# Patient Record
Sex: FEMALE | Race: WHITE | Employment: FULL TIME | URBAN - METROPOLITAN AREA
[De-identification: names, ages, dates, MRNs, and addresses within clinical notes are randomized per-mention and may not be internally consistent; named-entity substitution may affect disease eponyms.]

---

## 2017-02-16 ENCOUNTER — HOSPITAL ENCOUNTER (OUTPATIENT)
Dept: ULTRASOUND IMAGING | Age: 42
Discharge: HOME | End: 2017-02-16
Attending: OBSTETRICS & GYNECOLOGY | Admitting: OBSTETRICS & GYNECOLOGY

## 2017-02-16 DIAGNOSIS — Z12.31 ENCOUNTER FOR SCREENING MAMMOGRAM FOR BREAST CANCER: ICD-10-CM

## 2017-02-16 DIAGNOSIS — D25.9 LEIOMYOMA OF UTERUS, UNSPECIFIED: ICD-10-CM

## 2017-02-16 DIAGNOSIS — D25.9 LEIOMYOMA OF UTERUS: ICD-10-CM

## 2017-04-30 ENCOUNTER — HOSPITAL ENCOUNTER (OUTPATIENT)
Dept: OTHER | Age: 42
Discharge: HOME | End: 2017-04-30
Attending: PHYSICIAN ASSISTANT | Admitting: PHYSICIAN ASSISTANT

## 2018-04-03 PROBLEM — N93.9 ABNORMAL UTERINE BLEEDING (AUB): Status: ACTIVE | Noted: 2018-04-03

## 2018-04-04 ENCOUNTER — HOSPITAL ENCOUNTER (OUTPATIENT)
Dept: SURGERY | Age: 43
Discharge: HOME | End: 2018-04-04
Attending: OBSTETRICS & GYNECOLOGY | Admitting: OBSTETRICS & GYNECOLOGY

## 2018-04-04 VITALS
RESPIRATION RATE: 18 BRPM | BODY MASS INDEX: 34.99 KG/M2 | HEIGHT: 65 IN | TEMPERATURE: 97.4 F | DIASTOLIC BLOOD PRESSURE: 81 MMHG | HEART RATE: 88 BPM | OXYGEN SATURATION: 94 % | SYSTOLIC BLOOD PRESSURE: 114 MMHG | WEIGHT: 210 LBS

## 2018-04-04 DIAGNOSIS — N93.9 ABNORMAL UTERINE BLEEDING (AUB): ICD-10-CM

## 2018-04-04 LAB — PREGNANCY, URINE: NEGATIVE

## 2018-04-04 PROCEDURE — 93010 ELECTROCARDIOGRAM REPORT: CPT | Performed by: INTERNAL MEDICINE

## 2018-04-04 RX ORDER — LIDOCAINE HYDROCHLORIDE 10 MG/ML
1 INJECTION, SOLUTION EPIDURAL; INFILTRATION; INTRACAUDAL; PERINEURAL
Status: ACTIVE | OUTPATIENT
Start: 2018-04-04 | End: 2018-04-04

## 2018-04-04 RX ORDER — DIPHENHYDRAMINE HYDROCHLORIDE 50 MG/ML
12.5 INJECTION INTRAMUSCULAR; INTRAVENOUS
Status: ACTIVE | OUTPATIENT
Start: 2018-04-04 | End: 2018-04-04

## 2018-04-04 RX ORDER — HYDRALAZINE HYDROCHLORIDE 20 MG/ML
5 INJECTION INTRAMUSCULAR; INTRAVENOUS
Status: DISCONTINUED | OUTPATIENT
Start: 2018-04-04 | End: 2018-04-05 | Stop reason: HOSPADM

## 2018-04-04 RX ORDER — ONDANSETRON 4 MG/1
4 TABLET, ORALLY DISINTEGRATING ORAL EVERY 8 HOURS PRN
Qty: 5 TABLET | Refills: 1 | Status: SHIPPED | OUTPATIENT
Start: 2018-04-04 | End: 2020-01-22

## 2018-04-04 RX ORDER — SODIUM CHLORIDE 0.9 % (FLUSH) 0.9 %
10 SYRINGE (ML) INJECTION EVERY 12 HOURS SCHEDULED
Status: DISCONTINUED | OUTPATIENT
Start: 2018-04-04 | End: 2018-04-05 | Stop reason: HOSPADM

## 2018-04-04 RX ORDER — IBUPROFEN 600 MG/1
600 TABLET ORAL EVERY 6 HOURS PRN
Qty: 30 TABLET | Refills: 1 | Status: ON HOLD | OUTPATIENT
Start: 2018-04-04 | End: 2021-07-04

## 2018-04-04 RX ORDER — SODIUM CHLORIDE, SODIUM LACTATE, POTASSIUM CHLORIDE, CALCIUM CHLORIDE 600; 310; 30; 20 MG/100ML; MG/100ML; MG/100ML; MG/100ML
INJECTION, SOLUTION INTRAVENOUS CONTINUOUS
Status: DISCONTINUED | OUTPATIENT
Start: 2018-04-04 | End: 2018-04-05 | Stop reason: HOSPADM

## 2018-04-04 RX ORDER — ONDANSETRON 2 MG/ML
4 INJECTION INTRAMUSCULAR; INTRAVENOUS
Status: ACTIVE | OUTPATIENT
Start: 2018-04-04 | End: 2018-04-04

## 2018-04-04 RX ORDER — ACETAMINOPHEN AND CODEINE PHOSPHATE 120; 12 MG/5ML; MG/5ML
1 SOLUTION ORAL DAILY
COMMUNITY
End: 2020-01-22

## 2018-04-04 RX ORDER — SODIUM CHLORIDE 0.9 % (FLUSH) 0.9 %
10 SYRINGE (ML) INJECTION PRN
Status: DISCONTINUED | OUTPATIENT
Start: 2018-04-04 | End: 2018-04-04

## 2018-04-04 RX ORDER — SODIUM CHLORIDE 0.9 % (FLUSH) 0.9 %
10 SYRINGE (ML) INJECTION EVERY 12 HOURS SCHEDULED
Status: DISCONTINUED | OUTPATIENT
Start: 2018-04-04 | End: 2018-04-04

## 2018-04-04 RX ORDER — OXYCODONE HYDROCHLORIDE AND ACETAMINOPHEN 5; 325 MG/1; MG/1
1 TABLET ORAL PRN
Status: ACTIVE | OUTPATIENT
Start: 2018-04-04 | End: 2018-04-04

## 2018-04-04 RX ORDER — MORPHINE SULFATE 2 MG/ML
2 INJECTION, SOLUTION INTRAMUSCULAR; INTRAVENOUS EVERY 5 MIN PRN
Status: DISCONTINUED | OUTPATIENT
Start: 2018-04-04 | End: 2018-04-05 | Stop reason: HOSPADM

## 2018-04-04 RX ORDER — OXYCODONE HYDROCHLORIDE AND ACETAMINOPHEN 5; 325 MG/1; MG/1
2 TABLET ORAL PRN
Status: ACTIVE | OUTPATIENT
Start: 2018-04-04 | End: 2018-04-04

## 2018-04-04 RX ORDER — SODIUM CHLORIDE 0.9 % (FLUSH) 0.9 %
10 SYRINGE (ML) INJECTION PRN
Status: DISCONTINUED | OUTPATIENT
Start: 2018-04-04 | End: 2018-04-05 | Stop reason: HOSPADM

## 2018-04-04 RX ORDER — MORPHINE SULFATE 2 MG/ML
1 INJECTION, SOLUTION INTRAMUSCULAR; INTRAVENOUS EVERY 5 MIN PRN
Status: DISCONTINUED | OUTPATIENT
Start: 2018-04-04 | End: 2018-04-05 | Stop reason: HOSPADM

## 2018-04-04 RX ORDER — MEPERIDINE HYDROCHLORIDE 50 MG/ML
12.5 INJECTION INTRAMUSCULAR; INTRAVENOUS; SUBCUTANEOUS EVERY 5 MIN PRN
Status: DISCONTINUED | OUTPATIENT
Start: 2018-04-04 | End: 2018-04-05 | Stop reason: HOSPADM

## 2018-04-04 RX ORDER — LABETALOL HYDROCHLORIDE 5 MG/ML
5 INJECTION, SOLUTION INTRAVENOUS EVERY 10 MIN PRN
Status: DISCONTINUED | OUTPATIENT
Start: 2018-04-04 | End: 2018-04-05 | Stop reason: HOSPADM

## 2018-04-04 RX ADMIN — SODIUM CHLORIDE, SODIUM LACTATE, POTASSIUM CHLORIDE, CALCIUM CHLORIDE: 600; 310; 30; 20 INJECTION, SOLUTION INTRAVENOUS at 07:23

## 2018-04-04 ASSESSMENT — PAIN SCALES - GENERAL
PAINLEVEL_OUTOF10: 0
PAINLEVEL_OUTOF10: 3
PAINLEVEL_OUTOF10: 3
PAINLEVEL_OUTOF10: 0
PAINLEVEL_OUTOF10: 0

## 2018-04-04 ASSESSMENT — PAIN DESCRIPTION - LOCATION: LOCATION: VAGINA

## 2018-04-04 ASSESSMENT — PAIN DESCRIPTION - FREQUENCY: FREQUENCY: CONTINUOUS

## 2018-04-04 ASSESSMENT — PAIN DESCRIPTION - DESCRIPTORS: DESCRIPTORS: CRAMPING

## 2018-04-04 ASSESSMENT — PAIN - FUNCTIONAL ASSESSMENT: PAIN_FUNCTIONAL_ASSESSMENT: 0-10

## 2018-04-04 ASSESSMENT — PAIN DESCRIPTION - ONSET: ONSET: GRADUAL

## 2018-04-04 ASSESSMENT — PAIN DESCRIPTION - PAIN TYPE: TYPE: SURGICAL PAIN

## 2018-04-05 LAB
EKG ATRIAL RATE: 68 BPM
EKG DIAGNOSIS: NORMAL
EKG P AXIS: 20 DEGREES
EKG P-R INTERVAL: 150 MS
EKG Q-T INTERVAL: 406 MS
EKG QRS DURATION: 108 MS
EKG QTC CALCULATION (BAZETT): 431 MS
EKG R AXIS: -15 DEGREES
EKG T AXIS: -7 DEGREES
EKG VENTRICULAR RATE: 68 BPM

## 2019-05-03 ENCOUNTER — HOSPITAL ENCOUNTER (OUTPATIENT)
Dept: MAMMOGRAPHY | Age: 44
Discharge: HOME | End: 2019-05-03
Payer: COMMERCIAL

## 2019-05-03 DIAGNOSIS — Z12.31 SCREENING MAMMOGRAM, ENCOUNTER FOR: ICD-10-CM

## 2019-05-03 PROCEDURE — 77067 SCR MAMMO BI INCL CAD: CPT

## 2020-01-22 ENCOUNTER — HOSPITAL ENCOUNTER (EMERGENCY)
Age: 45
Discharge: HOME | End: 2020-01-22
Attending: EMERGENCY MEDICINE
Payer: COMMERCIAL

## 2020-01-22 ENCOUNTER — APPOINTMENT (OUTPATIENT)
Dept: CT IMAGING | Age: 45
End: 2020-01-22
Payer: COMMERCIAL

## 2020-01-22 ENCOUNTER — APPOINTMENT (OUTPATIENT)
Dept: GENERAL RADIOLOGY | Age: 45
End: 2020-01-22
Payer: COMMERCIAL

## 2020-01-22 VITALS
OXYGEN SATURATION: 99 % | HEIGHT: 65 IN | WEIGHT: 200 LBS | DIASTOLIC BLOOD PRESSURE: 71 MMHG | HEART RATE: 67 BPM | BODY MASS INDEX: 33.32 KG/M2 | TEMPERATURE: 98 F | SYSTOLIC BLOOD PRESSURE: 112 MMHG | RESPIRATION RATE: 14 BRPM

## 2020-01-22 LAB
A/G RATIO: 1.4 (ref 1.1–2.2)
ALBUMIN SERPL-MCNC: 3.9 G/DL (ref 3.4–5)
ALP BLD-CCNC: 76 U/L (ref 40–129)
ALT SERPL-CCNC: 16 U/L (ref 10–40)
ANION GAP SERPL CALCULATED.3IONS-SCNC: 12 MMOL/L (ref 3–16)
AST SERPL-CCNC: 18 U/L (ref 15–37)
BASOPHILS ABSOLUTE: 0 K/UL (ref 0–0.2)
BASOPHILS RELATIVE PERCENT: 0.6 %
BILIRUB SERPL-MCNC: 0.4 MG/DL (ref 0–1)
BUN BLDV-MCNC: 8 MG/DL (ref 7–20)
CALCIUM SERPL-MCNC: 8.7 MG/DL (ref 8.3–10.6)
CHLORIDE BLD-SCNC: 104 MMOL/L (ref 99–110)
CO2: 24 MMOL/L (ref 21–32)
CREAT SERPL-MCNC: 0.6 MG/DL (ref 0.6–1.1)
EKG ATRIAL RATE: 81 BPM
EKG DIAGNOSIS: NORMAL
EKG P AXIS: 20 DEGREES
EKG P-R INTERVAL: 148 MS
EKG Q-T INTERVAL: 366 MS
EKG QRS DURATION: 112 MS
EKG QTC CALCULATION (BAZETT): 425 MS
EKG R AXIS: -11 DEGREES
EKG T AXIS: 7 DEGREES
EKG VENTRICULAR RATE: 81 BPM
EOSINOPHILS ABSOLUTE: 0.1 K/UL (ref 0–0.6)
EOSINOPHILS RELATIVE PERCENT: 2.2 %
GFR AFRICAN AMERICAN: >60
GFR NON-AFRICAN AMERICAN: >60
GLOBULIN: 2.7 G/DL
GLUCOSE BLD-MCNC: 120 MG/DL (ref 70–99)
HCT VFR BLD CALC: 39.7 % (ref 36–48)
HEMOGLOBIN: 13.3 G/DL (ref 12–16)
LYMPHOCYTES ABSOLUTE: 2.1 K/UL (ref 1–5.1)
LYMPHOCYTES RELATIVE PERCENT: 31.3 %
MCH RBC QN AUTO: 30.5 PG (ref 26–34)
MCHC RBC AUTO-ENTMCNC: 33.4 G/DL (ref 31–36)
MCV RBC AUTO: 91.3 FL (ref 80–100)
MONOCYTES ABSOLUTE: 0.4 K/UL (ref 0–1.3)
MONOCYTES RELATIVE PERCENT: 6.2 %
NEUTROPHILS ABSOLUTE: 4.1 K/UL (ref 1.7–7.7)
NEUTROPHILS RELATIVE PERCENT: 59.7 %
PDW BLD-RTO: 13 % (ref 12.4–15.4)
PLATELET # BLD: 261 K/UL (ref 135–450)
PMV BLD AUTO: 7.9 FL (ref 5–10.5)
POTASSIUM REFLEX MAGNESIUM: 4.1 MMOL/L (ref 3.5–5.1)
RBC # BLD: 4.35 M/UL (ref 4–5.2)
SODIUM BLD-SCNC: 140 MMOL/L (ref 136–145)
TOTAL PROTEIN: 6.6 G/DL (ref 6.4–8.2)
TROPONIN: <0.01 NG/ML
WBC # BLD: 6.8 K/UL (ref 4–11)

## 2020-01-22 PROCEDURE — 80053 COMPREHEN METABOLIC PANEL: CPT

## 2020-01-22 PROCEDURE — 93005 ELECTROCARDIOGRAM TRACING: CPT | Performed by: EMERGENCY MEDICINE

## 2020-01-22 PROCEDURE — 6370000000 HC RX 637 (ALT 250 FOR IP): Performed by: EMERGENCY MEDICINE

## 2020-01-22 PROCEDURE — 93010 ELECTROCARDIOGRAM REPORT: CPT | Performed by: INTERNAL MEDICINE

## 2020-01-22 PROCEDURE — 6360000002 HC RX W HCPCS: Performed by: EMERGENCY MEDICINE

## 2020-01-22 PROCEDURE — 71260 CT THORAX DX C+: CPT

## 2020-01-22 PROCEDURE — 84484 ASSAY OF TROPONIN QUANT: CPT

## 2020-01-22 PROCEDURE — 85025 COMPLETE CBC W/AUTO DIFF WBC: CPT

## 2020-01-22 PROCEDURE — 99285 EMERGENCY DEPT VISIT HI MDM: CPT

## 2020-01-22 PROCEDURE — 6360000004 HC RX CONTRAST MEDICATION: Performed by: EMERGENCY MEDICINE

## 2020-01-22 PROCEDURE — 96374 THER/PROPH/DIAG INJ IV PUSH: CPT

## 2020-01-22 PROCEDURE — 71046 X-RAY EXAM CHEST 2 VIEWS: CPT

## 2020-01-22 RX ORDER — NAPROXEN 500 MG/1
500 TABLET ORAL 2 TIMES DAILY PRN
Qty: 20 TABLET | Refills: 0 | Status: SHIPPED | OUTPATIENT
Start: 2020-01-22 | End: 2020-08-20

## 2020-01-22 RX ORDER — AZITHROMYCIN 250 MG/1
250 TABLET, FILM COATED ORAL SEE ADMIN INSTRUCTIONS
Qty: 6 TABLET | Refills: 0 | Status: SHIPPED | OUTPATIENT
Start: 2020-01-22 | End: 2020-01-27

## 2020-01-22 RX ORDER — LISINOPRIL 10 MG/1
10 TABLET ORAL DAILY
COMMUNITY
End: 2022-07-05 | Stop reason: ALTCHOICE

## 2020-01-22 RX ORDER — ASPIRIN 81 MG/1
324 TABLET, CHEWABLE ORAL ONCE
Status: COMPLETED | OUTPATIENT
Start: 2020-01-22 | End: 2020-01-22

## 2020-01-22 RX ORDER — MORPHINE SULFATE 4 MG/ML
4 INJECTION, SOLUTION INTRAMUSCULAR; INTRAVENOUS
Status: DISCONTINUED | OUTPATIENT
Start: 2020-01-22 | End: 2020-01-22 | Stop reason: HOSPADM

## 2020-01-22 RX ADMIN — IOPAMIDOL 75 ML: 755 INJECTION, SOLUTION INTRAVENOUS at 11:13

## 2020-01-22 RX ADMIN — MORPHINE SULFATE 4 MG: 4 INJECTION, SOLUTION INTRAMUSCULAR; INTRAVENOUS at 09:52

## 2020-01-22 RX ADMIN — ASPIRIN 81 MG 324 MG: 81 TABLET ORAL at 09:52

## 2020-01-22 ASSESSMENT — PAIN DESCRIPTION - LOCATION: LOCATION: CHEST

## 2020-01-22 ASSESSMENT — PAIN SCALES - GENERAL
PAINLEVEL_OUTOF10: 9
PAINLEVEL_OUTOF10: 9

## 2020-01-22 ASSESSMENT — PAIN DESCRIPTION - PAIN TYPE: TYPE: ACUTE PAIN

## 2020-01-22 NOTE — ED PROVIDER NOTES
Social Needs    Financial resource strain: Not on file    Food insecurity:     Worry: Not on file     Inability: Not on file    Transportation needs:     Medical: Not on file     Non-medical: Not on file   Tobacco Use    Smoking status: Never Smoker    Smokeless tobacco: Never Used   Substance and Sexual Activity    Alcohol use: No    Drug use: No    Sexual activity: Not on file   Lifestyle    Physical activity:     Days per week: Not on file     Minutes per session: Not on file    Stress: Not on file   Relationships    Social connections:     Talks on phone: Not on file     Gets together: Not on file     Attends [employer] service: Not on file     Active member of club or organization: Not on file     Attends meetings of clubs or organizations: Not on file     Relationship status: Not on file    Intimate partner violence:     Fear of current or ex partner: Not on file     Emotionally abused: Not on file     Physically abused: Not on file     Forced sexual activity: Not on file   Other Topics Concern    Not on file   Social History Narrative    Not on file     Current Facility-Administered Medications   Medication Dose Route Frequency Provider Last Rate Last Dose    morphine sulfate (PF) injection 4 mg  4 mg Intravenous Q1H PRN Mateus Omalley MD   4 mg at 01/22/20 0275     Current Outpatient Medications   Medication Sig Dispense Refill    lisinopril (PRINIVIL;ZESTRIL) 10 MG tablet Take 10 mg by mouth daily      naproxen (NAPROSYN) 500 MG tablet Take 1 tablet by mouth 2 times daily as needed for Pain 20 tablet 0    azithromycin (ZITHROMAX) 250 MG tablet Take 1 tablet by mouth See Admin Instructions for 5 days 500mg on day 1 followed by 250mg on days 2 - 5 6 tablet 0    ibuprofen (ADVIL;MOTRIN) 600 MG tablet Take 1 tablet by mouth every 6 hours as needed for Pain 30 tablet 1     No Known Allergies    REVIEW OF SYSTEMS  10 systems reviewed, pertinent positives per HPI otherwise noted to be negative. PHYSICAL EXAM  /71   Pulse 67   Temp 98 °F (36.7 °C) (Oral)   Resp 14   Ht 5' 5\" (1.651 m)   Wt 200 lb (90.7 kg)   LMP 12/30/2019   SpO2 99%   BMI 33.28 kg/m²    GENERAL APPEARANCE: Awake and alert. Cooperative. No acute distress. HENT: Normocephalic. Atraumatic. Mucous membranes are moist.  No drooling or stridor. No posterior pharyngeal erythema or exudate. Uvula midline and nonedematous. NECK: Supple. EYES: PERRL. EOM's grossly intact. HEART/CHEST: RRR. No murmurs. 2+ radial pulses bilaterally. LUNGS: Respirations unlabored. CTAB. No wheezes rales or rhonchi. Good air exchange. Speaking comfortably in full sentences. No reproducible chest wall tenderness. ABDOMEN: No tenderness. Soft. Non-distended. No masses. No organomegaly. No guarding or rebound. MUSCULOSKELETAL: No extremity edema. Compartments soft. No deformity. No tenderness in the extremities. All extremities neurovascularly intact. SKIN: Warm and dry. No acute rashes. NEUROLOGICAL: Alert and oriented. CN's 2-12 intact. No gross facial drooping. No gross focal deficits. PSYCHIATRIC: Normal mood and affect. LABS  I have reviewed all labs for this visit.    Results for orders placed or performed during the hospital encounter of 01/22/20   Troponin   Result Value Ref Range    Troponin <0.01 <0.01 ng/mL   CBC Auto Differential   Result Value Ref Range    WBC 6.8 4.0 - 11.0 K/uL    RBC 4.35 4.00 - 5.20 M/uL    Hemoglobin 13.3 12.0 - 16.0 g/dL    Hematocrit 39.7 36.0 - 48.0 %    MCV 91.3 80.0 - 100.0 fL    MCH 30.5 26.0 - 34.0 pg    MCHC 33.4 31.0 - 36.0 g/dL    RDW 13.0 12.4 - 15.4 %    Platelets 995 806 - 809 K/uL    MPV 7.9 5.0 - 10.5 fL    Neutrophils % 59.7 %    Lymphocytes % 31.3 %    Monocytes % 6.2 %    Eosinophils % 2.2 %    Basophils % 0.6 %    Neutrophils Absolute 4.1 1.7 - 7.7 K/uL    Lymphocytes Absolute 2.1 1.0 - 5.1 K/uL    Monocytes Absolute 0.4 0.0 - 1.3 K/uL    Eosinophils Absolute 0.1 0.0 - 0.6 K/uL    Basophils Absolute 0.0 0.0 - 0.2 K/uL   Comprehensive Metabolic Panel w/ Reflex to MG   Result Value Ref Range    Sodium 140 136 - 145 mmol/L    Potassium reflex Magnesium 4.1 3.5 - 5.1 mmol/L    Chloride 104 99 - 110 mmol/L    CO2 24 21 - 32 mmol/L    Anion Gap 12 3 - 16    Glucose 120 (H) 70 - 99 mg/dL    BUN 8 7 - 20 mg/dL    CREATININE 0.6 0.6 - 1.1 mg/dL    GFR Non-African American >60 >60    GFR African American >60 >60    Calcium 8.7 8.3 - 10.6 mg/dL    Total Protein 6.6 6.4 - 8.2 g/dL    Alb 3.9 3.4 - 5.0 g/dL    Albumin/Globulin Ratio 1.4 1.1 - 2.2    Total Bilirubin 0.4 0.0 - 1.0 mg/dL    Alkaline Phosphatase 76 40 - 129 U/L    ALT 16 10 - 40 U/L    AST 18 15 - 37 U/L    Globulin 2.7 g/dL   EKG 12 Lead   Result Value Ref Range    Ventricular Rate 81 BPM    Atrial Rate 81 BPM    P-R Interval 148 ms    QRS Duration 112 ms    Q-T Interval 366 ms    QTc Calculation (Bazett) 425 ms    P Axis 20 degrees    R Axis -11 degrees    T Axis 7 degrees    Diagnosis       Normal sinus rhythmNormal ECGNo previous ECGs available       ECG  The Ekg interpreted by me shows  normal sinus rhythm with a rate of 81  Axis is   Normal  QTc is  normal  Intervals and Durations are unremarkable. ST Segments: nonspecific changes  No significant change from prior EKG dated 4/4/18    RADIOLOGY  Xr Chest Standard (2 Vw)    Result Date: 1/22/2020  EXAMINATION: TWO XRAY VIEWS OF THE CHEST 1/22/2020 9:37 am COMPARISON: January 23, 2018 HISTORY: ORDERING SYSTEM PROVIDED HISTORY: chest pain, shortness of breath TECHNOLOGIST PROVIDED HISTORY: Reason for exam:->chest pain, shortness of breath Reason for Exam: chest pain starting this AM radiating down left arm Acuity: Acute Type of Exam: Initial FINDINGS: No evidence of pneumonia, edema or other acute pulmonary process. No evidence of acute process of the cardiac or mediastinal structures. No evidence of pneumothorax or pleural effusion.      No evidence of acute cardiopulmonary disease. Ct Chest Pulmonary Embolism W Contrast    Result Date: 1/22/2020  EXAMINATION: CTA OF THE CHEST 1/22/2020 11:13 am TECHNIQUE: CTA of the chest was performed after the administration of intravenous contrast.  Multiplanar reformatted images are provided for review. MIP images are provided for review. Dose modulation, iterative reconstruction, and/or weight based adjustment of the mA/kV was utilized to reduce the radiation dose to as low as reasonably achievable. COMPARISON: Chest radiograph earlier same date. No prior CTs. HISTORY: ORDERING SYSTEM PROVIDED HISTORY: left sharp CP radiating to back TECHNOLOGIST PROVIDED HISTORY: Reason for exam:->left sharp CP radiating to back Reason for Exam: patient states that she has chest pain that began this morning and goes down her left arm and into her back) Acuity: Acute Type of Exam: Initial FINDINGS: Pulmonary Arteries: There is streak artifact involving the medial right upper lobe pulmonary artery, slightly limiting evaluation. The remainder of the pulmonary arteries are well visualized without evidence of intraluminal filling defect to suggest pulmonary embolism. Main pulmonary artery is normal in caliber. Mediastinum: No evidence of mediastinal lymphadenopathy. The heart and pericardium demonstrate no acute abnormality. There is no acute abnormality of the thoracic aorta. Lungs/pleura: The central airways are patent. There is bibasilar dependent atelectasis. There is mild asymmetric ground-glass opacity within the left lower lobe. No evidence of edema. No evidence of pleural effusion or pneumothorax. Upper Abdomen: Limited images of the upper abdomen are unremarkable. Soft Tissues/Bones: There is an incidental 1.5 cm nodular area of low-attenuation in the right thyroid, poorly evaluated on CT imaging. Soft tissues and osseous structures are without acute process. No evidence of pulmonary embolism.  Bibasilar airspace disease Details   naproxen (NAPROSYN) 500 MG tablet Take 1 tablet by mouth 2 times daily as needed for Pain, Disp-20 tablet, R-0Print      azithromycin (ZITHROMAX) 250 MG tablet Take 1 tablet by mouth See Admin Instructions for 5 days 500mg on day 1 followed by 250mg on days 2 - 5, Disp-6 tablet, R-0Print             Follow-up with:  Robe Roblero, SHANTA - [facility]  [address] [facility] [address] [place] [address]  [phone]    Schedule an appointment as soon as possible for a visit in 2 days  For recheck      DISCLAIMER: This chart was created using Dragon dictation software. Efforts were made by me to ensure accuracy, however some errors may be present due to limitations of this technology and occasionally words are not transcribed correctly.        Cata Gomez MD  01/22/20 8545

## 2020-07-24 ENCOUNTER — HOSPITAL ENCOUNTER (EMERGENCY)
Age: 45
Discharge: HOME | End: 2020-07-24
Payer: COMMERCIAL

## 2020-07-24 VITALS
TEMPERATURE: 99 F | DIASTOLIC BLOOD PRESSURE: 101 MMHG | OXYGEN SATURATION: 100 % | BODY MASS INDEX: 33.95 KG/M2 | WEIGHT: 204 LBS | HEART RATE: 83 BPM | RESPIRATION RATE: 100 BRPM | SYSTOLIC BLOOD PRESSURE: 141 MMHG

## 2020-07-24 LAB
A/G RATIO: 1.6 (ref 1.1–2.2)
ABO/RH: NORMAL
ALBUMIN SERPL-MCNC: 4.2 G/DL (ref 3.4–5)
ALP BLD-CCNC: 79 U/L (ref 40–129)
ALT SERPL-CCNC: 15 U/L (ref 10–40)
ANION GAP SERPL CALCULATED.3IONS-SCNC: 12 MMOL/L (ref 3–16)
ANTIBODY SCREEN: NORMAL
AST SERPL-CCNC: 18 U/L (ref 15–37)
BASOPHILS ABSOLUTE: 0 K/UL (ref 0–0.2)
BASOPHILS RELATIVE PERCENT: 0.7 %
BILIRUB SERPL-MCNC: <0.2 MG/DL (ref 0–1)
BUN BLDV-MCNC: 10 MG/DL (ref 7–20)
CALCIUM SERPL-MCNC: 8.7 MG/DL (ref 8.3–10.6)
CHLORIDE BLD-SCNC: 106 MMOL/L (ref 99–110)
CO2: 24 MMOL/L (ref 21–32)
CREAT SERPL-MCNC: 0.7 MG/DL (ref 0.6–1.1)
EOSINOPHILS ABSOLUTE: 0.1 K/UL (ref 0–0.6)
EOSINOPHILS RELATIVE PERCENT: 2.2 %
GFR AFRICAN AMERICAN: >60
GFR NON-AFRICAN AMERICAN: >60
GLOBULIN: 2.7 G/DL
GLUCOSE BLD-MCNC: 112 MG/DL (ref 70–99)
HCG QUALITATIVE: NEGATIVE
HCT VFR BLD CALC: 27.2 % (ref 36–48)
HEMOGLOBIN: 8.5 G/DL (ref 12–16)
LYMPHOCYTES ABSOLUTE: 2.4 K/UL (ref 1–5.1)
LYMPHOCYTES RELATIVE PERCENT: 35 %
MCH RBC QN AUTO: 23.7 PG (ref 26–34)
MCHC RBC AUTO-ENTMCNC: 31.3 G/DL (ref 31–36)
MCV RBC AUTO: 75.8 FL (ref 80–100)
MONOCYTES ABSOLUTE: 0.5 K/UL (ref 0–1.3)
MONOCYTES RELATIVE PERCENT: 7 %
NEUTROPHILS ABSOLUTE: 3.8 K/UL (ref 1.7–7.7)
NEUTROPHILS RELATIVE PERCENT: 55.1 %
PDW BLD-RTO: 16.4 % (ref 12.4–15.4)
PLATELET # BLD: 445 K/UL (ref 135–450)
PMV BLD AUTO: 7.6 FL (ref 5–10.5)
POTASSIUM REFLEX MAGNESIUM: 4 MMOL/L (ref 3.5–5.1)
RBC # BLD: 3.59 M/UL (ref 4–5.2)
SODIUM BLD-SCNC: 142 MMOL/L (ref 136–145)
TOTAL PROTEIN: 6.9 G/DL (ref 6.4–8.2)
WBC # BLD: 6.9 K/UL (ref 4–11)

## 2020-07-24 PROCEDURE — 99283 EMERGENCY DEPT VISIT LOW MDM: CPT

## 2020-07-24 PROCEDURE — 86850 RBC ANTIBODY SCREEN: CPT

## 2020-07-24 PROCEDURE — 85025 COMPLETE CBC W/AUTO DIFF WBC: CPT

## 2020-07-24 PROCEDURE — 86901 BLOOD TYPING SEROLOGIC RH(D): CPT

## 2020-07-24 PROCEDURE — 80053 COMPREHEN METABOLIC PANEL: CPT

## 2020-07-24 PROCEDURE — 84703 CHORIONIC GONADOTROPIN ASSAY: CPT

## 2020-07-24 PROCEDURE — 86900 BLOOD TYPING SEROLOGIC ABO: CPT

## 2020-07-24 NOTE — ED PROVIDER NOTES
[address] ED  EMERGENCY DEPARTMENT ENCOUNTER        Pt Name: Queta Marques  MRN: [id number]  [place] 1975  Date of evaluation: 7/24/2020  Provider: MADELIN Prieto  PCP: SHANTA Díaz CNP    This patient was not seen and evaluated by the attending physician No att. providers found. I have evaluated this patient. My supervising physician was available for consultation. CHIEF COMPLAINT       Chief Complaint   Patient presents with    Anemia     told by her pcp her hemaglobin in 7.  pt states heavy periods. has been sob and tired for months. HISTORY OF PRESENT ILLNESS   (Location/Symptom, Timing/Onset, Context/Setting, Quality, Duration, Modifying Factors, Severity)  Note limiting factors. Queta Marques is a 50 y.o. female with significant past medical history of hypertension, abnormal uterine bleeding who presents via private vehicle from her PCP, Raina for evaluation of concern for anemia. Patient notes that she has had abnormal uterine bleeding for a while but worse over the last 3 months. Her periods last approximately 10 to 15 days and she notes that at her heaviest she will bleed through a maxi pad every 15 minutes. She has an appointment with her OB/GYN on August 5 to discuss this, she recently discontinued her birth control because she felt that she was bleeding worse with it. She does not smoke. She saw her PCP earlier today and they noted that she looked paler than she typically does and they checked her blood and she had a low hemoglobin which prompted referral to the ER. Patient notes that she had her last menstrual period on July 12 and she just stopped bleeding today. She notes feeling like she may pass out with position changes this is been going on since about midcycle. She notes feeling extremely fatigued. She denies any headaches or vision changes nausea vomiting abdominal pain any recent fevers. She denies any chest pain.   She Narrative:     Performed at:  [facility] [address] [address],  [place], [facility]   Phone [phone]   HCG, SERUM, QUALITATIVE    Narrative:     Performed at:  [facility]  [address],  [place], [facility]   Phone [phone]   TYPE AND SCREEN       All other labs were within normal range or not returned as of this dictation. EKG: All EKG's are interpreted by the Emergency Department Physician who either signs orCo-signs this chart in the absence of a cardiologist.  Please see their note for interpretation of EKG. RADIOLOGY:   Non-plain film images such as CT, Ultrasound and MRI are read by the radiologist. Plain radiographic images are visualized andpreliminarily interpreted by the  ED Provider with the below findings:        Interpretation perthe Radiologist below, if available at the time of this note:    No orders to display     No results found. PROCEDURES   Unless otherwise noted below, none     Procedures    CRITICAL CARE TIME   N/A    CONSULTS:  None      EMERGENCY DEPARTMENT COURSE and DIFFERENTIALDIAGNOSIS/MDM:   Vitals:    Vitals:    07/24/20 1622   BP: (!) 174/100   Pulse: 100   Resp: 16   Temp: 99 °F (37.2 °C)   TempSrc: Oral   SpO2: 100%   Weight: 204 lb (92.5 kg)       Patient was given thefollowing medications:  Medications - No data to display    Patient seen and evaluated. Old records reviewed. Diagnostic testing reviewed and results discussed. I have independently evaluated this patient based upon my scope of practice. Supervising physician was in the department for consultation as needed. Patient is a 57-year-old female who presents for evaluation of anemia. On exam she is alert oriented afebrile well-perfused hemodynamically stable nontoxic in appearance. She is not hypotensive, she is afebrile, she appears well overall however she does have mild some conjunctival pallor.   Will assess labs and reevaluate. Hemoglobin is 8.5 hematocrit 27.2, depressed however patient not currently requiring blood products at this time. BUN is within normal limits I have low concern for acute GI bleed. At this time patient will be discharged home with instructions to follow-up with her OB/GYN as scheduled and her PCP if needed. Strict return precautions advised. Based on patient's clinical history and clinical findings I currently estimate there is low risk for:  ACUTE CORONARY SYNDROME, INTRACRANIAL HEMORRHAGE, MALIGNANT DYSRHYTHMIA, MENINGITIS, PNEUMONIA, PULMONARY EMBOLISM, SEPSIS, SUBARACHNOID HEMORRHAGE, SUBDURAL HEMATOMA, STROKE, or URINARY TRACT INFECTION, thus I consider the discharge disposition reasonable. Luis Ruggiero and I have discussed the diagnosis and risks, and we agree with discharging home to follow-up with their primary doctor. We also discussed returning to the Emergency Department immediately if new or worsening symptoms occur. We have discussed the symptoms which are most concerning (e.g., changing or worsening pain, weakness, vomiting, fever) that necessitate immediate return. Pt is in agreement with the current plan and all questions were addressed. FINAL IMPRESSION      1. Acute blood loss anemia    2. Fatigue associated with anemia    3.  Dysfunctional uterine bleeding          DISPOSITION/PLAN   DISPOSITION Decision To Discharge 07/24/2020 06:21:49 PM      PATIENT REFERREDTO:  Your OB/GYN    Go to   as scheduled    [facility] PHYSICAL [facility] ED  [address] [facility]  [phone]  Go to   If symptoms worsen    SHANTA Grullon CNP  [address] [facility]  [phone]    Go to   If symptoms worsen      DISCHARGE MEDICATIONS:  New Prescriptions    No medications on file       DISCONTINUED MEDICATIONS:  Discontinued Medications    No medications on file              (Please note that portions ofthis note were completed with a voice recognition program.  Efforts were made to edit the dictations but occasionally words are mis-transcribed.)    MADELIN Taveras (electronically signed)       María Taveras  07/24/20 5671

## 2020-08-26 ENCOUNTER — ANESTHESIA EVENT (OUTPATIENT)
Dept: OPERATING ROOM | Age: 45
End: 2020-08-26
Payer: COMMERCIAL

## 2020-08-27 ENCOUNTER — HOSPITAL ENCOUNTER (OUTPATIENT)
Age: 45
Setting detail: OUTPATIENT SURGERY
Discharge: HOME | End: 2020-08-27
Attending: OBSTETRICS & GYNECOLOGY | Admitting: OBSTETRICS & GYNECOLOGY
Payer: COMMERCIAL

## 2020-08-27 ENCOUNTER — ANESTHESIA (OUTPATIENT)
Dept: OPERATING ROOM | Age: 45
End: 2020-08-27
Payer: COMMERCIAL

## 2020-08-27 VITALS
DIASTOLIC BLOOD PRESSURE: 72 MMHG | RESPIRATION RATE: 19 BRPM | SYSTOLIC BLOOD PRESSURE: 119 MMHG | OXYGEN SATURATION: 100 %

## 2020-08-27 VITALS
HEART RATE: 75 BPM | WEIGHT: 202 LBS | TEMPERATURE: 97 F | SYSTOLIC BLOOD PRESSURE: 125 MMHG | OXYGEN SATURATION: 95 % | RESPIRATION RATE: 15 BRPM | HEIGHT: 65 IN | BODY MASS INDEX: 33.66 KG/M2 | DIASTOLIC BLOOD PRESSURE: 83 MMHG

## 2020-08-27 PROBLEM — D50.0 IRON DEFICIENCY ANEMIA DUE TO CHRONIC BLOOD LOSS: Status: ACTIVE | Noted: 2020-08-27

## 2020-08-27 PROBLEM — D25.9 FIBROID UTERUS: Status: ACTIVE | Noted: 2020-08-27

## 2020-08-27 LAB
HCT VFR BLD CALC: 31.1 % (ref 36–48)
HEMOGLOBIN: 9.6 G/DL (ref 12–16)
MCH RBC QN AUTO: 22.9 PG (ref 26–34)
MCHC RBC AUTO-ENTMCNC: 30.8 G/DL (ref 31–36)
MCV RBC AUTO: 74.4 FL (ref 80–100)
PDW BLD-RTO: 18.4 % (ref 12.4–15.4)
PLATELET # BLD: 428 K/UL (ref 135–450)
PMV BLD AUTO: 7.8 FL (ref 5–10.5)
PREGNANCY, URINE: NEGATIVE
RBC # BLD: 4.18 M/UL (ref 4–5.2)
WBC # BLD: 5.7 K/UL (ref 4–11)

## 2020-08-27 PROCEDURE — 2720000010 HC SURG SUPPLY STERILE: Performed by: OBSTETRICS & GYNECOLOGY

## 2020-08-27 PROCEDURE — 7100000010 HC PHASE II RECOVERY - FIRST 15 MIN: Performed by: OBSTETRICS & GYNECOLOGY

## 2020-08-27 PROCEDURE — 2580000003 HC RX 258: Performed by: NURSE ANESTHETIST, CERTIFIED REGISTERED

## 2020-08-27 PROCEDURE — 7100000001 HC PACU RECOVERY - ADDTL 15 MIN: Performed by: OBSTETRICS & GYNECOLOGY

## 2020-08-27 PROCEDURE — 3600000012 HC SURGERY LEVEL 2 ADDTL 15MIN: Performed by: OBSTETRICS & GYNECOLOGY

## 2020-08-27 PROCEDURE — 2500000003 HC RX 250 WO HCPCS: Performed by: NURSE ANESTHETIST, CERTIFIED REGISTERED

## 2020-08-27 PROCEDURE — 6360000002 HC RX W HCPCS: Performed by: NURSE ANESTHETIST, CERTIFIED REGISTERED

## 2020-08-27 PROCEDURE — 6360000002 HC RX W HCPCS: Performed by: ANESTHESIOLOGY

## 2020-08-27 PROCEDURE — 7100000011 HC PHASE II RECOVERY - ADDTL 15 MIN: Performed by: OBSTETRICS & GYNECOLOGY

## 2020-08-27 PROCEDURE — 2709999900 HC NON-CHARGEABLE SUPPLY: Performed by: OBSTETRICS & GYNECOLOGY

## 2020-08-27 PROCEDURE — 88305 TISSUE EXAM BY PATHOLOGIST: CPT

## 2020-08-27 PROCEDURE — 85027 COMPLETE CBC AUTOMATED: CPT

## 2020-08-27 PROCEDURE — 7100000000 HC PACU RECOVERY - FIRST 15 MIN: Performed by: OBSTETRICS & GYNECOLOGY

## 2020-08-27 PROCEDURE — 6370000000 HC RX 637 (ALT 250 FOR IP): Performed by: ANESTHESIOLOGY

## 2020-08-27 PROCEDURE — 3700000000 HC ANESTHESIA ATTENDED CARE: Performed by: OBSTETRICS & GYNECOLOGY

## 2020-08-27 PROCEDURE — 3600000002 HC SURGERY LEVEL 2 BASE: Performed by: OBSTETRICS & GYNECOLOGY

## 2020-08-27 PROCEDURE — 2580000003 HC RX 258: Performed by: ANESTHESIOLOGY

## 2020-08-27 PROCEDURE — 84703 CHORIONIC GONADOTROPIN ASSAY: CPT

## 2020-08-27 PROCEDURE — 3700000001 HC ADD 15 MINUTES (ANESTHESIA): Performed by: OBSTETRICS & GYNECOLOGY

## 2020-08-27 RX ORDER — KETOROLAC TROMETHAMINE 30 MG/ML
INJECTION, SOLUTION INTRAMUSCULAR; INTRAVENOUS PRN
Status: DISCONTINUED | OUTPATIENT
Start: 2020-08-27 | End: 2020-08-27 | Stop reason: SDUPTHER

## 2020-08-27 RX ORDER — LIDOCAINE HYDROCHLORIDE 20 MG/ML
INJECTION, SOLUTION INFILTRATION; PERINEURAL PRN
Status: DISCONTINUED | OUTPATIENT
Start: 2020-08-27 | End: 2020-08-27 | Stop reason: SDUPTHER

## 2020-08-27 RX ORDER — SODIUM CHLORIDE 0.9 % (FLUSH) 0.9 %
10 SYRINGE (ML) INJECTION EVERY 12 HOURS SCHEDULED
Status: DISCONTINUED | OUTPATIENT
Start: 2020-08-27 | End: 2020-08-27 | Stop reason: HOSPADM

## 2020-08-27 RX ORDER — KETOROLAC TROMETHAMINE 30 MG/ML
INJECTION, SOLUTION INTRAMUSCULAR; INTRAVENOUS PRN
Status: DISCONTINUED | OUTPATIENT
Start: 2020-08-27 | End: 2020-08-27

## 2020-08-27 RX ORDER — SODIUM CHLORIDE, SODIUM LACTATE, POTASSIUM CHLORIDE, CALCIUM CHLORIDE 600; 310; 30; 20 MG/100ML; MG/100ML; MG/100ML; MG/100ML
INJECTION, SOLUTION INTRAVENOUS CONTINUOUS PRN
Status: DISCONTINUED | OUTPATIENT
Start: 2020-08-27 | End: 2020-08-27 | Stop reason: SDUPTHER

## 2020-08-27 RX ORDER — LABETALOL HYDROCHLORIDE 5 MG/ML
5 INJECTION, SOLUTION INTRAVENOUS EVERY 10 MIN PRN
Status: DISCONTINUED | OUTPATIENT
Start: 2020-08-27 | End: 2020-08-27 | Stop reason: HOSPADM

## 2020-08-27 RX ORDER — OXYCODONE HYDROCHLORIDE AND ACETAMINOPHEN 5; 325 MG/1; MG/1
1 TABLET ORAL PRN
Status: COMPLETED | OUTPATIENT
Start: 2020-08-27 | End: 2020-08-27

## 2020-08-27 RX ORDER — SODIUM CHLORIDE 0.9 % (FLUSH) 0.9 %
10 SYRINGE (ML) INJECTION PRN
Status: DISCONTINUED | OUTPATIENT
Start: 2020-08-27 | End: 2020-08-27 | Stop reason: HOSPADM

## 2020-08-27 RX ORDER — ONDANSETRON 4 MG/1
4 TABLET, ORALLY DISINTEGRATING ORAL EVERY 8 HOURS PRN
Qty: 10 TABLET | Refills: 1 | Status: ON HOLD | OUTPATIENT
Start: 2020-08-27 | End: 2021-07-04

## 2020-08-27 RX ORDER — PROPOFOL 10 MG/ML
INJECTION, EMULSION INTRAVENOUS PRN
Status: DISCONTINUED | OUTPATIENT
Start: 2020-08-27 | End: 2020-08-27 | Stop reason: SDUPTHER

## 2020-08-27 RX ORDER — SODIUM CHLORIDE, SODIUM LACTATE, POTASSIUM CHLORIDE, CALCIUM CHLORIDE 600; 310; 30; 20 MG/100ML; MG/100ML; MG/100ML; MG/100ML
INJECTION, SOLUTION INTRAVENOUS CONTINUOUS
Status: DISCONTINUED | OUTPATIENT
Start: 2020-08-27 | End: 2020-08-27 | Stop reason: HOSPADM

## 2020-08-27 RX ORDER — MEPERIDINE HYDROCHLORIDE 50 MG/ML
12.5 INJECTION INTRAMUSCULAR; INTRAVENOUS; SUBCUTANEOUS EVERY 5 MIN PRN
Status: DISCONTINUED | OUTPATIENT
Start: 2020-08-27 | End: 2020-08-27 | Stop reason: HOSPADM

## 2020-08-27 RX ORDER — MIDAZOLAM HYDROCHLORIDE 1 MG/ML
INJECTION INTRAMUSCULAR; INTRAVENOUS PRN
Status: DISCONTINUED | OUTPATIENT
Start: 2020-08-27 | End: 2020-08-27 | Stop reason: SDUPTHER

## 2020-08-27 RX ORDER — LIDOCAINE HYDROCHLORIDE 10 MG/ML
1 INJECTION, SOLUTION EPIDURAL; INFILTRATION; INTRACAUDAL; PERINEURAL
Status: DISCONTINUED | OUTPATIENT
Start: 2020-08-27 | End: 2020-08-27 | Stop reason: HOSPADM

## 2020-08-27 RX ORDER — ONDANSETRON 2 MG/ML
4 INJECTION INTRAMUSCULAR; INTRAVENOUS EVERY 10 MIN PRN
Status: DISCONTINUED | OUTPATIENT
Start: 2020-08-27 | End: 2020-08-27 | Stop reason: HOSPADM

## 2020-08-27 RX ORDER — FERROUS SULFATE 325(65) MG
325 TABLET ORAL
Qty: 90 TABLET | Refills: 1 | Status: ON HOLD | OUTPATIENT
Start: 2020-08-27 | End: 2021-07-04

## 2020-08-27 RX ORDER — OXYCODONE HYDROCHLORIDE AND ACETAMINOPHEN 5; 325 MG/1; MG/1
2 TABLET ORAL PRN
Status: COMPLETED | OUTPATIENT
Start: 2020-08-27 | End: 2020-08-27

## 2020-08-27 RX ORDER — HYDRALAZINE HYDROCHLORIDE 20 MG/ML
5 INJECTION INTRAMUSCULAR; INTRAVENOUS EVERY 10 MIN PRN
Status: DISCONTINUED | OUTPATIENT
Start: 2020-08-27 | End: 2020-08-27 | Stop reason: HOSPADM

## 2020-08-27 RX ORDER — DOCUSATE SODIUM 100 MG/1
100 CAPSULE, LIQUID FILLED ORAL 2 TIMES DAILY PRN
Qty: 60 CAPSULE | Refills: 1 | Status: ON HOLD | OUTPATIENT
Start: 2020-08-27 | End: 2021-07-04

## 2020-08-27 RX ORDER — ONDANSETRON 2 MG/ML
INJECTION INTRAMUSCULAR; INTRAVENOUS PRN
Status: DISCONTINUED | OUTPATIENT
Start: 2020-08-27 | End: 2020-08-27 | Stop reason: SDUPTHER

## 2020-08-27 RX ORDER — FERROUS SULFATE 325(65) MG
325 TABLET ORAL
Status: ON HOLD | COMMUNITY
End: 2020-08-27 | Stop reason: SDUPTHER

## 2020-08-27 RX ORDER — FENTANYL CITRATE 50 UG/ML
INJECTION, SOLUTION INTRAMUSCULAR; INTRAVENOUS PRN
Status: DISCONTINUED | OUTPATIENT
Start: 2020-08-27 | End: 2020-08-27 | Stop reason: SDUPTHER

## 2020-08-27 RX ORDER — DEXAMETHASONE SODIUM PHOSPHATE 4 MG/ML
INJECTION, SOLUTION INTRA-ARTICULAR; INTRALESIONAL; INTRAMUSCULAR; INTRAVENOUS; SOFT TISSUE PRN
Status: DISCONTINUED | OUTPATIENT
Start: 2020-08-27 | End: 2020-08-27 | Stop reason: SDUPTHER

## 2020-08-27 RX ADMIN — ONDANSETRON 4 MG: 2 INJECTION INTRAMUSCULAR; INTRAVENOUS at 10:05

## 2020-08-27 RX ADMIN — HYDROMORPHONE HYDROCHLORIDE 0.5 MG: 1 INJECTION, SOLUTION INTRAMUSCULAR; INTRAVENOUS; SUBCUTANEOUS at 11:38

## 2020-08-27 RX ADMIN — HYDROMORPHONE HYDROCHLORIDE 0.5 MG: 1 INJECTION, SOLUTION INTRAMUSCULAR; INTRAVENOUS; SUBCUTANEOUS at 11:31

## 2020-08-27 RX ADMIN — SODIUM CHLORIDE, POTASSIUM CHLORIDE, SODIUM LACTATE AND CALCIUM CHLORIDE: 600; 310; 30; 20 INJECTION, SOLUTION INTRAVENOUS at 09:57

## 2020-08-27 RX ADMIN — SODIUM CHLORIDE, POTASSIUM CHLORIDE, SODIUM LACTATE AND CALCIUM CHLORIDE: 600; 310; 30; 20 INJECTION, SOLUTION INTRAVENOUS at 08:47

## 2020-08-27 RX ADMIN — LIDOCAINE HYDROCHLORIDE 60 MG: 20 INJECTION, SOLUTION INFILTRATION; PERINEURAL at 10:00

## 2020-08-27 RX ADMIN — OXYCODONE HYDROCHLORIDE AND ACETAMINOPHEN 1 TABLET: 5; 325 TABLET ORAL at 11:32

## 2020-08-27 RX ADMIN — KETOROLAC TROMETHAMINE 30 MG: 30 INJECTION, SOLUTION INTRAMUSCULAR; INTRAVENOUS at 10:44

## 2020-08-27 RX ADMIN — DEXAMETHASONE SODIUM PHOSPHATE 6 MG: 4 INJECTION, SOLUTION INTRAMUSCULAR; INTRAVENOUS at 10:05

## 2020-08-27 RX ADMIN — PROPOFOL 350 MG: 10 INJECTION, EMULSION INTRAVENOUS at 10:00

## 2020-08-27 RX ADMIN — MIDAZOLAM HYDROCHLORIDE 2 MG: 2 INJECTION, SOLUTION INTRAMUSCULAR; INTRAVENOUS at 09:57

## 2020-08-27 RX ADMIN — FENTANYL CITRATE 50 MCG: 50 INJECTION INTRAMUSCULAR; INTRAVENOUS at 10:00

## 2020-08-27 ASSESSMENT — PAIN SCALES - GENERAL
PAINLEVEL_OUTOF10: 7
PAINLEVEL_OUTOF10: 5
PAINLEVEL_OUTOF10: 7
PAINLEVEL_OUTOF10: 0
PAINLEVEL_OUTOF10: 7
PAINLEVEL_OUTOF10: 0
PAINLEVEL_OUTOF10: 7
PAINLEVEL_OUTOF10: 0
PAINLEVEL_OUTOF10: 0
PAINLEVEL_OUTOF10: 7
PAINLEVEL_OUTOF10: 0
PAINLEVEL_OUTOF10: 7

## 2020-08-27 ASSESSMENT — PULMONARY FUNCTION TESTS
PIF_VALUE: 17
PIF_VALUE: 3
PIF_VALUE: 2
PIF_VALUE: 2
PIF_VALUE: 16
PIF_VALUE: 14
PIF_VALUE: 21
PIF_VALUE: 23
PIF_VALUE: 1
PIF_VALUE: 2
PIF_VALUE: 2
PIF_VALUE: 26
PIF_VALUE: 2
PIF_VALUE: 2
PIF_VALUE: 1
PIF_VALUE: 0
PIF_VALUE: 2
PIF_VALUE: 3
PIF_VALUE: 0
PIF_VALUE: 2
PIF_VALUE: 3
PIF_VALUE: 2
PIF_VALUE: 2
PIF_VALUE: 1
PIF_VALUE: 2
PIF_VALUE: 2
PIF_VALUE: 3
PIF_VALUE: 2
PIF_VALUE: 3
PIF_VALUE: 20
PIF_VALUE: 3
PIF_VALUE: 18
PIF_VALUE: 7
PIF_VALUE: 2
PIF_VALUE: 18
PIF_VALUE: 2
PIF_VALUE: 16
PIF_VALUE: 1
PIF_VALUE: 2
PIF_VALUE: 18
PIF_VALUE: 5
PIF_VALUE: 2
PIF_VALUE: 2

## 2020-08-27 ASSESSMENT — PAIN - FUNCTIONAL ASSESSMENT: PAIN_FUNCTIONAL_ASSESSMENT: 0-10

## 2020-08-27 NOTE — OP NOTE
[facility]                                OPERATIVE REPORT    PATIENT NAME: Adeline Kumar                        :        1975  MED REC NO:   [id number]                          ROOM:  ACCOUNT NO:   [de-identified]                           ADMIT DATE: 2020  PROVIDER:     Anamaria Cisneros MD    DATE OF PROCEDURE:  2020    PREOPERATIVE DIAGNOSES:  1. Menorrhagia. 2.  Anemia. 3.  Fibroid uterus. POSTOPERATIVE DIAGNOSES:  1. Menorrhagia. 2.  Anemia. 3.  Fibroid uterus. 4.  Endocervical polyp. SURGEON:  Anamaria Cisneros MD    ANESTHESIA:  General laryngeal mask airway. ESTIMATED BLOOD LOSS:  Less than 10 mL. FINDINGS:  1.  3 cm polyp versus fibroid visible within the cervix upon placement  of vaginal instruments. 2.  Uterus palpating at 10 cm, ovaries not palpable due to habitus. 3.  Fluid deficit 375 mL. 4.  NovaSure settings:  Cavity length 5.5, cervical length 3.5, cavity  width 4.7, power 142 barnes, time of ablation 43 seconds. 5.  Uterus sounding to 9 cm. INDICATIONS:  A 50year-old G3, P 2-1-0-3 with longstanding menorrhagia,  recently in the hospital with bleeding to severe anemia requiring  blood transfusion. She has a long history of heavy menses and is status  post hysteroscopy with D and C and polypectomy in 2018. She is status  post bilateral tubal sterilization. Her last period in July was very  heavy, passing large clots. Her most recent period was not as heavy. The patient tried the progesterone only pill followed by Provera, but  she stopped the Provera in 2020 because she felt that it decreased  her sex drive. Since stopping the Provera, her periods had continued to  worsen and she wishes to proceed with surgical intervention. Options  were reviewed with the patient and we will proceed with a NovaSure  endometrial ablation.   She had a normal finding on was  successful. NovaSure was activated and time of ablation was 43 seconds. The NovaSure was then removed and hysteroscope was advanced one final  time. Ablated endometrial tissue was visualized throughout. The patient tolerated the procedure well. Sponge counts were correct  x2. She was taken to Recovery in stable condition.         Shavonne Bazan MD    D: 08/27/2020 11:07:35       T: 08/27/2020 12:08:18     [email or web address]  Job#: [id number]     Doc#: [id number]    CC:

## 2020-08-27 NOTE — ANESTHESIA POSTPROCEDURE EVALUATION
Department of Anesthesiology  Postprocedure Note    Patient: Annmarie Potter  MRN: [id number]  YOB: 1975  Date of evaluation: 8/27/2020  Time:  1:00 PM     Procedure Summary     Date:  08/27/20 Room / Location:  [address] [place] [address]    Anesthesia Start:  0957 Anesthesia Stop:  1046    Procedure:  VIDEO HYSTEROSCOPY DILATATION AND CURETTAGE, NOVASURE ABLATION, MYOSURE (N/A Vagina ) Diagnosis:       Excessive and frequent menstruation      (Excessive and frequent menstruation [N92.0])    Surgeon:  Adilene Arevalo MD Responsible Provider:  Harry Steele MD    Anesthesia Type:  general ASA Status:  2          Anesthesia Type: general    Ajsmina Phase I: Jasmina Score: 8    Jasmina Phase II: Jasmina Score: 10    Last vitals: Reviewed and per EMR flowsheets.        Anesthesia Post Evaluation    Patient location during evaluation: PACU  Level of consciousness: awake  Airway patency: patent  Nausea & Vomiting: no nausea  Complications: no  Cardiovascular status: blood pressure returned to baseline  Respiratory status: acceptable  Hydration status: euvolemic

## 2020-08-27 NOTE — H&P
H&P reviewed, no changes. R/b/a d/w pt, questions answered, consent signed. To OR for hysteroscopy, D&C, ablation due to menorrhagia to anemia.       8/27/2020  8:54 AM - Yen Browne Incoming Lab Results From Soft (Epic Adt)     Component  Value  Ref Range & Units  Status  Collected  Lab    WBC  5.7  4.0 - 11.0 K/uL  Final  08/27/2020  8:42 AM  [facility]    RBC  4.18  4.00 - 5.20 M/uL  Final  08/27/2020  8:42 AM  [facility]    Hemoglobin  9.6Low    12.0 - 16.0 g/dL  Final  08/27/2020  8:42 AM  [facility]    Hematocrit  31.1Low    36.0 - 48.0 %  Final  08/27/2020  8:42 AM  [facility]    MCV  74.4Low    80.0 - 100.0 fL  Final  08/27/2020  8:42 AM  [facility]    [facility]  22.9Low    26.0 - 34.0 pg  Final  08/27/2020  8:42 AM  [facility]    MCHC  30.8Low    31.0 - 36.0 g/dL  Final  08/27/2020  8:42 AM  [facility]    RDW  18.4High    12.4 - 15.4 %  Final  08/27/2020  8:42 AM  [facility]    Platelets  230  531 - 450 K/uL  Final  08/27/2020  8:42 AM  [address] Lab    MPV  7.8  5.0 - 10.5 fL  Final  08/27/2020  8:42 AM  [facility]

## 2020-08-27 NOTE — H&P
>      PATIENT:  Onesimo Woodson  YOB: 1975  DATE:   08/25/2020 11:00 AM   VISIT TYPE: Office Visit - GYN        This 50year old female presents for pre op HSC/ D&C/ Novasure. History of Present Illness:  1.  pre op HSC/ D&C/ Novasure   14 [id number] with longstanding menorrhagia, recently in hospital with bleeding to anemia. S/p hysteroscopy, D&C, polypectomy 2yrs ago 2018, nl cotest at that time, next cotest due 2023. S/p BTL.  in the past year. LMP current, prior menses July 12th-23rd, passing huge blood clots, was in hospital with severe anemia. We had switched from POP to provera last year, but she stopped the provera 1/2020 due to impact on her libido and her menses have continued to worsen. She is obese. Surgical options discussed w/pt many times for years and she has previously been resistant to surgical intervention. EMBx 8/7/20: normal    U/s 8/20/20: Heterogenous uterus 9.9 x 8 x 7cm. Nl ovaries. Right/mid posterior fibroid 1.3cm    Had left sided pain with recent pelvic u/s              Screening Tools  Other Screenings:  Encounter Date Performed Date Instrument Score Severity/Interpretation MDD Classification   08/25/2020 08/25/2020 CAGE-AID Alcohol and Drug Screening 0 None    08/25/2020 08/25/2020 Patient Health Questionnaire (PHQ-2) 0 Further testing is not required    08/25/2020 08/25/2020 SDOH 0 Intervention not needed      CAGE ALCOHOL SCREENING TEST      Felt need to cut down drinking? No       Ever felt annoyed by criticism of drinking? No       Had guilty feelings about drinking? No       Ever take morning eye-opener?  No       Performed Date:       Score: 0        PROBLEM LIST:     Problem Description Onset Date Chronic Clinical Status Notes   Essential hypertension  Y           Medications (active prior to today)  Medication Name Sig Description Start Date Stop Date Refilled Rx Elsewhere   lisinopril 10 mg tablet TAKE ONE TABLET BY MOUTH DAILY 06/26/2020 06/26/2020 N ferrous sulfate 325 mg (65 mg iron) tablet,delayed release take 1 tablet by oral route 3 times every day 08/05/2020 08/05/2020 N   Colace 100 mg capsule take 1 capsule by oral route  every 12 hours as needed for constipation 08/05/2020   N   norethindrone (contraceptive) 0.35 mg tablet TAKE ONE TABLET BY MOUTH DAILY for menorrhagia 08/05/2020 08/05/2020 N   tizanidine 4 mg tablet take 1 Tablet by Oral route  every 8 hours 08/14/2020   N   diclofenac potassium 50 mg tablet take 1 tablet by oral route 2 times every day 08/14/2020   N     Medication Reconciliation  Medications reconciled today. Medication Reviewed  Adherence Medication Name Sig Desc Elsewhere Status   not taking Colace 100 mg capsule take 1 capsule by oral route  every 12 hours as needed for constipation N Verified   not taking diclofenac potassium 50 mg tablet take 1 tablet by oral route 2 times every day N Verified   taking as directed ferrous sulfate 325 mg (65 mg iron) tablet,delayed release take 1 tablet by oral route 3 times every day N Verified   taking as directed lisinopril 10 mg tablet TAKE ONE TABLET BY MOUTH DAILY N Verified   taking as directed norethindrone (contraceptive) 0.35 mg tablet TAKE ONE TABLET BY MOUTH DAILY for menorrhagia N Verified   not taking tizanidine 4 mg tablet take 1 Tablet by Oral route  every 8 hours N Verified     Allergies:  Ingredient Reaction (Severity) Medication Name Comment   TIOCONAZOLE vag burning, swelling, abd. pain Monistat 1 (tioconazole)          Review of Systems  System Neg/Pos Details   Constitutional Negative Chills/rigors and Fever. Eyes Negative Vision changes and Vision loss. Respiratory Negative Cough and Wheezing. Cardio Negative Chest pain and Irregular heartbeat/palpitations. GI Negative Abdominal pain, Constipation, Diarrhea, Nausea, Reflux and Vomiting.  Negative Back pain, Dysuria, Hematuria and Urgency. Neuro Negative Dizziness and Headache.    Integumentary Negative Rash.   MS Negative Back pain. Hema/Lymph Negative Easy bleeding. Reproductive Positive Irregular menses, The patient is pre-menopausal.       Vital Signs     Last menses was 08/19/2020. Time BP mm/Hg Pulse /min Resp /min Temp F Ht ft Ht in Ht cm Wt lb Wt kg BMI kg/m2 BSA m2 O2 Sat%   11:18 /82 107 14 98.2 5.0 6.00 167.64 202.20 91.716 32.64 2.07 98     Measured By  Time Measured by   11:18 AM Abdifatah Saucedo MA     Physical Exam  Exam Findings Details   Constitutional Normal Well developed. Respiratory Normal Inspection - Normal. Auscultation - Normal. Effort - Normal.   Cardiovascular Normal Rhythm - Regular. Extra sounds - None. Murmurs - None. Abdomen Normal Anterior palpation -  No rebound. No abdominal tenderness. No hepatic enlargement. No hernia. Genitourinary * Obesity limits exam accuracy. Genitourinary Normal Urethral meatus - Normal. External genitalia - Normal. Glands - Normal. Perineum - Normal. Anus - Normal. Vagina - Normal. Cervix - Normal. Uterus - Normal. Adnexa - Normal. No suprapubic tenderness. No CVA tenderness. Skin Normal Inspection - Normal.   Psychiatric Normal Orientation - Oriented to time, place, person & situation. Appropriate mood and affect. Completed Orders (This Visit)  Order Details Reason Side Interpretation Result Initial Treatment Date Region   CAGE-AID Alcohol and Drug Screening    None 0     Patient Health Questionnaire (PHQ-2)    Further testing is not required 0     SDOH    Intervention not needed 0     Giving encouragement to exercise          Lifestyle education regarding diet            Assessment/Plan  # Detail Type Description    1. Assessment Menorrhagia with irregular cycle (N92.1). Impression To OR for hysteroscopy, D&C, endometrial ablation. R/b/a reviewed w/pt, questions answered, consent signed. 2. Assessment Iron deficiency anemia due to chronic blood loss (D50.0).          3. Assessment Uterine leiomyoma, unspecified location (D25. 9). 4. Assessment Body mass index (BMI) 32.0-32.9, adult (D36.03). 5. Assessment Dietary counseling and surveillance (Z71.3). Plan Orders Today's instructions / counseling include(s) Lifestyle education regarding diet. Giving encouragement to exercise                        Medications (Added, Continued or Stopped today):  Start Date Medication Directions PRN Status PRN Reason Instruction Stop Date   08/05/2020 Colace 100 mg capsule take 1 capsule by oral route  every 12 hours as needed for constipation N constipation     08/14/2020 diclofenac potassium 50 mg tablet take 1 tablet by oral route 2 times every day N      08/05/2020 ferrous sulfate 325 mg (65 mg iron) tablet,delayed release take 1 tablet by oral route 3 times every day N      06/26/2020 lisinopril 10 mg tablet TAKE ONE TABLET BY MOUTH DAILY N      08/05/2020 norethindrone (contraceptive) 0.35 mg tablet TAKE ONE TABLET BY MOUTH DAILY for menorrhagia N menorrhagia     08/14/2020 tizanidine 4 mg tablet take 1 Tablet by Oral route  every 8 hours N            Active Patient Care Team Members    Name Contact Agency Type Support Role Relationship Active Date Inactive Date Specialty   [address]   Patient provider PCP   Nurse Practitioner NP       Provider:   Nadir Yousif MD, Doron Bernal 08/25/2020 11:43 AM   Document generated by:  Mohsen Bryant 08/25/2020 11:43 AM  [address] [place] [address]  Phone: [phone]  Fax: [phone]      Electronically signed by Kenny Saenz MD on 08/25/2020 02:46 PM

## 2020-08-27 NOTE — ANESTHESIA PRE PROCEDURE
Department of Anesthesiology  Preprocedure Note       Name:  Saman Vasquez   Age:  50 y.o.  :  1975                                          MRN:  [id number]         Date:  2020      Surgeon: Daniella Manning):  Steven Oshea MD    Procedure: Procedure(s):  VIDEO HYSTEROSCOPY DILATATION AND CURETTAGE, NOVASURE ABLATION    Medications prior to admission:   Prior to Admission medications    Medication Sig Start Date End Date Taking?  Authorizing Provider   ferrous sulfate (IRON 325) 325 (65 Fe) MG tablet Take 325 mg by mouth daily (with breakfast)   Yes Historical Provider, MD Rolando Pat 1045 Birth control   Yes Historical Provider, MD   lisinopril (PRINIVIL;ZESTRIL) 10 MG tablet Take 10 mg by mouth daily   Yes Historical Provider, MD   ibuprofen (ADVIL;MOTRIN) 600 MG tablet Take 1 tablet by mouth every 6 hours as needed for Pain 18  Yes Steven Oshea MD       Current medications:    Current Facility-Administered Medications   Medication Dose Route Frequency Provider Last Rate Last Dose    lactated ringers infusion   Intravenous Continuous Sadie Clemente  mL/hr at 20 0847      sodium chloride flush 0.9 % injection 10 mL  10 mL Intravenous 2 times per day Sadie Clemente MD        sodium chloride flush 0.9 % injection 10 mL  10 mL Intravenous PRN Sadie Clemente MD        lidocaine PF 1 % injection 1 mL  1 mL Intradermal Once PRN Sadie Clemente MD        sodium chloride flush 0.9 % injection 10 mL  10 mL Intravenous 2 times per day Steven Oshea MD        sodium chloride flush 0.9 % injection 10 mL  10 mL Intravenous PRN Steven Oshea MD           Allergies:  No Known Allergies    Problem List:    Patient Active Problem List   Diagnosis Code    Abnormal uterine bleeding (AUB) N93.9    Iron deficiency anemia due to chronic blood loss D50.0    Fibroid uterus D25.9       Past Medical History:        Diagnosis Date    Chest pain 2013    Hypertension     Prolonged emergence from general anesthesia        Past Surgical History:        Procedure Laterality Date    ANTERIOR CRUCIATE LIGAMENT REPAIR Left     CARPAL TUNNEL RELEASE Left 07/2020    HYSTEROSCOPY  1/23/15    polypectomy, D&C myosure    HYSTEROSCOPY  04/04/2018    KNEE ARTHROSCOPY Left     X 2    WRIST GANGLION EXCISION Left        Social History:    Social History     Tobacco Use    Smoking status: Never Smoker    Smokeless tobacco: Never Used   Substance Use Topics    Alcohol use: No                                Counseling given: Not Answered      Vital Signs (Current):   Vitals:    08/20/20 1330 08/27/20 0833   BP:  125/76   Pulse:  72   Resp:  16   Temp:  (!) 44.6 °F (7 °C)   SpO2:  98%   Weight: 214 lb (97.1 kg) 202 lb (91.6 kg)   Height: 5' 5\" (1.651 m) 5' 5\" (1.651 m)                                              BP Readings from Last 3 Encounters:   08/27/20 125/76   07/24/20 (!) 141/101   01/22/20 112/71       NPO Status: Time of last liquid consumption: 2230                        Time of last solid consumption: 2200                        Date of last liquid consumption: 08/26/20                        Date of last solid food consumption: 08/26/20    BMI:   Wt Readings from Last 3 Encounters:   08/27/20 202 lb (91.6 kg)   07/24/20 204 lb (92.5 kg)   01/22/20 200 lb (90.7 kg)     Body mass index is 33.61 kg/m².     CBC:   Lab Results   Component Value Date    WBC 5.7 08/27/2020    RBC 4.18 08/27/2020    HGB 9.6 08/27/2020    HCT 31.1 08/27/2020    MCV 74.4 08/27/2020    RDW 18.4 08/27/2020     08/27/2020       CMP:   Lab Results   Component Value Date     07/24/2020    K 4.0 07/24/2020     07/24/2020    CO2 24 07/24/2020    BUN 10 07/24/2020    CREATININE 0.7 07/24/2020    GFRAA >60 07/24/2020    AGRATIO 1.6 07/24/2020    LABGLOM >60 07/24/2020    GLUCOSE 112 07/24/2020    PROT 6.9 07/24/2020    CALCIUM 8.7 07/24/2020    BILITOT <0.2 07/24/2020    ALKPHOS 79 07/24/2020 AST 18 07/24/2020    ALT 15 07/24/2020       POC Tests: No results for input(s): POCGLU, POCNA, POCK, POCCL, POCBUN, POCHEMO, POCHCT in the last 72 hours. Coags:   Lab Results   Component Value Date    PROTIME 13.3 01/23/2018    INR 1.18 01/23/2018    APTT 32.3 01/23/2018       HCG (If Applicable):   Lab Results   Component Value Date    PREGTESTUR Negative 04/04/2018        ABGs: No results found for: PHART, PO2ART, LSC1FKI, TSG7VGN, BEART, C9DFTDNE     Type & Screen (If Applicable):  No results found for: LABABO, LABRH    Drug/Infectious Status (If Applicable):  No results found for: HIV, HEPCAB    COVID-19 Screening (If Applicable): No results found for: COVID19      Anesthesia Evaluation  Patient summary reviewed and Nursing notes reviewed no history of anesthetic complications:   Airway: Mallampati: II  TM distance: >3 FB   Neck ROM: full  Mouth opening: > = 3 FB Dental: normal exam         Pulmonary:Negative Pulmonary ROS                              Cardiovascular:    (+) hypertension:,                   Neuro/Psych:   Negative Neuro/Psych ROS              GI/Hepatic/Renal: Neg GI/Hepatic/Renal ROS       (-) GERD, liver disease and no renal disease       Endo/Other: Negative Endo/Other ROS       (-) diabetes mellitus               Abdominal:           Vascular: negative vascular ROS. Anesthesia Plan      general     ASA 2     (I discussed with the patient the risks and benefits of PIV, general anesthesia, IV Narcotics, PACU. All questions were answered the patient agrees with the plan)  Induction: intravenous. MIPS: Prophylactic antiemetics administered. Anesthetic plan and risks discussed with patient. Plan discussed with CRNA.                   Kyra Luis MD   8/27/2020

## 2020-09-09 ENCOUNTER — TELEPHONE (OUTPATIENT)
Dept: MAMMOGRAPHY | Age: 45
End: 2020-09-09

## 2020-09-09 ENCOUNTER — HOSPITAL ENCOUNTER (OUTPATIENT)
Dept: MAMMOGRAPHY | Age: 45
Discharge: HOME | End: 2020-09-09
Payer: COMMERCIAL

## 2020-09-09 PROCEDURE — 77067 SCR MAMMO BI INCL CAD: CPT

## 2021-01-24 ENCOUNTER — HOSPITAL ENCOUNTER (EMERGENCY)
Age: 46
Discharge: HOME | End: 2021-01-24
Payer: COMMERCIAL

## 2021-01-24 ENCOUNTER — APPOINTMENT (OUTPATIENT)
Dept: CT IMAGING | Age: 46
End: 2021-01-24
Payer: COMMERCIAL

## 2021-01-24 VITALS
SYSTOLIC BLOOD PRESSURE: 162 MMHG | DIASTOLIC BLOOD PRESSURE: 90 MMHG | OXYGEN SATURATION: 100 % | WEIGHT: 190 LBS | RESPIRATION RATE: 14 BRPM | BODY MASS INDEX: 31.65 KG/M2 | TEMPERATURE: 97.6 F | HEIGHT: 65 IN | HEART RATE: 77 BPM

## 2021-01-24 DIAGNOSIS — R10.31 RIGHT LOWER QUADRANT ABDOMINAL PAIN: ICD-10-CM

## 2021-01-24 DIAGNOSIS — N30.01 ACUTE CYSTITIS WITH HEMATURIA: Primary | ICD-10-CM

## 2021-01-24 LAB
A/G RATIO: 1.2 (ref 1.1–2.2)
ALBUMIN SERPL-MCNC: 4 G/DL (ref 3.4–5)
ALP BLD-CCNC: 96 U/L (ref 40–129)
ALT SERPL-CCNC: 18 U/L (ref 10–40)
ANION GAP SERPL CALCULATED.3IONS-SCNC: 8 MMOL/L (ref 3–16)
AST SERPL-CCNC: 41 U/L (ref 15–37)
BACTERIA: ABNORMAL /HPF
BASOPHILS ABSOLUTE: 0 K/UL (ref 0–0.2)
BASOPHILS RELATIVE PERCENT: 0.3 %
BILIRUB SERPL-MCNC: 0.5 MG/DL (ref 0–1)
BILIRUBIN URINE: NEGATIVE
BLOOD, URINE: ABNORMAL
BUN BLDV-MCNC: 10 MG/DL (ref 7–20)
CALCIUM SERPL-MCNC: 8.9 MG/DL (ref 8.3–10.6)
CHLORIDE BLD-SCNC: 105 MMOL/L (ref 99–110)
CLARITY: ABNORMAL
CO2: 24 MMOL/L (ref 21–32)
COLOR: ABNORMAL
CREAT SERPL-MCNC: 0.6 MG/DL (ref 0.6–1.1)
EOSINOPHILS ABSOLUTE: 0.1 K/UL (ref 0–0.6)
EOSINOPHILS RELATIVE PERCENT: 1.5 %
EPITHELIAL CELLS, UA: ABNORMAL /HPF (ref 0–5)
GFR AFRICAN AMERICAN: >60
GFR NON-AFRICAN AMERICAN: >60
GLOBULIN: 3.4 G/DL
GLUCOSE BLD-MCNC: 120 MG/DL (ref 70–99)
GLUCOSE URINE: NEGATIVE MG/DL
HCG QUALITATIVE: NEGATIVE
HCT VFR BLD CALC: 40.3 % (ref 36–48)
HEMOGLOBIN: 13.1 G/DL (ref 12–16)
KETONES, URINE: NEGATIVE MG/DL
LEUKOCYTE ESTERASE, URINE: ABNORMAL
LIPASE: 24 U/L (ref 13–60)
LYMPHOCYTES ABSOLUTE: 2.2 K/UL (ref 1–5.1)
LYMPHOCYTES RELATIVE PERCENT: 29.4 %
MCH RBC QN AUTO: 25.7 PG (ref 26–34)
MCHC RBC AUTO-ENTMCNC: 32.4 G/DL (ref 31–36)
MCV RBC AUTO: 79.3 FL (ref 80–100)
MICROSCOPIC EXAMINATION: YES
MONOCYTES ABSOLUTE: 0.4 K/UL (ref 0–1.3)
MONOCYTES RELATIVE PERCENT: 5.4 %
NEUTROPHILS ABSOLUTE: 4.8 K/UL (ref 1.7–7.7)
NEUTROPHILS RELATIVE PERCENT: 63.4 %
NITRITE, URINE: NEGATIVE
PDW BLD-RTO: 19.9 % (ref 12.4–15.4)
PH UA: 6.5 (ref 5–8)
PLATELET # BLD: 295 K/UL (ref 135–450)
PMV BLD AUTO: 8.3 FL (ref 5–10.5)
POTASSIUM REFLEX MAGNESIUM: 5.5 MMOL/L (ref 3.5–5.1)
POTASSIUM SERPL-SCNC: 3.6 MMOL/L (ref 3.5–5.1)
POTASSIUM SERPL-SCNC: 5.9 MMOL/L (ref 3.5–5.1)
PROTEIN UA: NEGATIVE MG/DL
RBC # BLD: 5.08 M/UL (ref 4–5.2)
RBC UA: ABNORMAL /HPF (ref 0–4)
SODIUM BLD-SCNC: 137 MMOL/L (ref 136–145)
SPECIFIC GRAVITY UA: 1.01 (ref 1–1.03)
TOTAL PROTEIN: 7.4 G/DL (ref 6.4–8.2)
URINE REFLEX TO CULTURE: YES
URINE TYPE: ABNORMAL
UROBILINOGEN, URINE: 0.2 E.U./DL
WBC # BLD: 7.5 K/UL (ref 4–11)
WBC UA: ABNORMAL /HPF (ref 0–5)

## 2021-01-24 PROCEDURE — 99285 EMERGENCY DEPT VISIT HI MDM: CPT

## 2021-01-24 PROCEDURE — 96374 THER/PROPH/DIAG INJ IV PUSH: CPT

## 2021-01-24 PROCEDURE — 6360000002 HC RX W HCPCS: Performed by: NURSE PRACTITIONER

## 2021-01-24 PROCEDURE — 6370000000 HC RX 637 (ALT 250 FOR IP): Performed by: NURSE PRACTITIONER

## 2021-01-24 PROCEDURE — 2580000003 HC RX 258: Performed by: NURSE PRACTITIONER

## 2021-01-24 PROCEDURE — 80053 COMPREHEN METABOLIC PANEL: CPT

## 2021-01-24 PROCEDURE — 74177 CT ABD & PELVIS W/CONTRAST: CPT

## 2021-01-24 PROCEDURE — 84132 ASSAY OF SERUM POTASSIUM: CPT

## 2021-01-24 PROCEDURE — 6360000004 HC RX CONTRAST MEDICATION: Performed by: NURSE PRACTITIONER

## 2021-01-24 PROCEDURE — 81001 URINALYSIS AUTO W/SCOPE: CPT

## 2021-01-24 PROCEDURE — 85025 COMPLETE CBC W/AUTO DIFF WBC: CPT

## 2021-01-24 PROCEDURE — 36415 COLL VENOUS BLD VENIPUNCTURE: CPT

## 2021-01-24 PROCEDURE — 84703 CHORIONIC GONADOTROPIN ASSAY: CPT

## 2021-01-24 PROCEDURE — 96375 TX/PRO/DX INJ NEW DRUG ADDON: CPT

## 2021-01-24 PROCEDURE — 83690 ASSAY OF LIPASE: CPT

## 2021-01-24 PROCEDURE — 87086 URINE CULTURE/COLONY COUNT: CPT

## 2021-01-24 RX ORDER — CEPHALEXIN 500 MG/1
500 CAPSULE ORAL 2 TIMES DAILY
Qty: 14 CAPSULE | Refills: 0 | Status: SHIPPED | OUTPATIENT
Start: 2021-01-24 | End: 2021-01-31

## 2021-01-24 RX ORDER — MORPHINE SULFATE 4 MG/ML
4 INJECTION, SOLUTION INTRAMUSCULAR; INTRAVENOUS ONCE
Status: COMPLETED | OUTPATIENT
Start: 2021-01-24 | End: 2021-01-24

## 2021-01-24 RX ORDER — ONDANSETRON 2 MG/ML
4 INJECTION INTRAMUSCULAR; INTRAVENOUS ONCE
Status: COMPLETED | OUTPATIENT
Start: 2021-01-24 | End: 2021-01-24

## 2021-01-24 RX ORDER — CEPHALEXIN 250 MG/1
500 CAPSULE ORAL ONCE
Status: COMPLETED | OUTPATIENT
Start: 2021-01-24 | End: 2021-01-24

## 2021-01-24 RX ORDER — 0.9 % SODIUM CHLORIDE 0.9 %
1000 INTRAVENOUS SOLUTION INTRAVENOUS ONCE
Status: COMPLETED | OUTPATIENT
Start: 2021-01-24 | End: 2021-01-24

## 2021-01-24 RX ADMIN — CEPHALEXIN 500 MG: 250 CAPSULE ORAL at 10:26

## 2021-01-24 RX ADMIN — MORPHINE SULFATE 4 MG: 4 INJECTION, SOLUTION INTRAMUSCULAR; INTRAVENOUS at 08:42

## 2021-01-24 RX ADMIN — ONDANSETRON 4 MG: 2 INJECTION INTRAMUSCULAR; INTRAVENOUS at 08:42

## 2021-01-24 RX ADMIN — SODIUM CHLORIDE 1000 ML: 9 INJECTION, SOLUTION INTRAVENOUS at 08:42

## 2021-01-24 RX ADMIN — IOPAMIDOL 75 ML: 755 INJECTION, SOLUTION INTRAVENOUS at 09:50

## 2021-01-24 ASSESSMENT — PAIN DESCRIPTION - FREQUENCY: FREQUENCY: CONTINUOUS

## 2021-01-24 ASSESSMENT — PAIN SCALES - GENERAL: PAINLEVEL_OUTOF10: 10

## 2021-01-24 ASSESSMENT — PAIN DESCRIPTION - PAIN TYPE: TYPE: ACUTE PAIN

## 2021-01-24 NOTE — ED PROVIDER NOTES
[facility] Emergency Department    CHIEF COMPLAINT  Abdominal Pain (patient states that she has been having right lower abdominal pain that started yesturday and has not gone away even with aleve. patient states the pain is getting worse. )      HISTORY OF PRESENT ILLNESS  Ezekiel Weston is a 50 y.o. female who presents to the ED complaining of right lower quadrant abdominal pain she describes as sharp stabbing that is been constant since yesterday. Patient does report associated nausea. Denies emesis, diarrhea, constipation, urinary complaints, vaginal discharge, flank pain. Patient denies radiation of pain. Patient denies ever experiencing pain like this before. Patient denies recent travel or known sick contacts. Patient does have a history of uterine fibroid with ablation otherwise no abdominal surgical history. Patient reports that this feels different than her uterine fibroid. Patient denies fever, chills, body aches, dizziness or syncope. No other complaints, modifying factors or associated symptoms. Nursing notes reviewed.    Past Medical History:   Diagnosis Date    Chest pain 11/14/2013    Hypertension     Prolonged emergence from general anesthesia      Past Surgical History:   Procedure Laterality Date    ANTERIOR CRUCIATE LIGAMENT REPAIR Left     CARPAL TUNNEL RELEASE Left 07/2020    DILATION AND CURETTAGE OF UTERUS N/A 8/27/2020    VIDEO HYSTEROSCOPY DILATATION AND CURETTAGE, Edouard Szymanski performed by Gordon Pearl MD at [address] [place] [address] HYSTEROSCOPY  1/23/15    polypectomy, D&C myosure    HYSTEROSCOPY  04/04/2018    KNEE ARTHROSCOPY Left     X 2    WRIST GANGLION EXCISION Left      Family History   Problem Relation Age of Onset    High Blood Pressure Mother     Diabetes Father     Heart Attack Father     High Blood Pressure Father      Social History     Socioeconomic History    Marital status:      Spouse name: Not on file  Number of children: Not on file    Years of education: Not on file    Highest education level: Not on file   Occupational History    Not on file   Social Needs    Financial resource strain: Not on file    Food insecurity     Worry: Not on file     Inability: Not on file    Transportation needs     Medical: Not on file     Non-medical: Not on file   Tobacco Use    Smoking status: Never Smoker    Smokeless tobacco: Never Used   Substance and Sexual Activity    Alcohol use: No    Drug use: No    Sexual activity: Not on file   Lifestyle    Physical activity     Days per week: Not on file     Minutes per session: Not on file    Stress: Not on file   Relationships    Social connections     Talks on phone: Not on file     Gets together: Not on file     Attends Anabaptism service: Not on file     Active member of club or organization: Not on file     Attends meetings of clubs or organizations: Not on file     Relationship status: Not on file    Intimate partner violence     Fear of current or ex partner: Not on file     Emotionally abused: Not on file     Physically abused: Not on file     Forced sexual activity: Not on file   Other Topics Concern    Not on file   Social History Narrative    Not on file     No current facility-administered medications for this encounter.       Current Outpatient Medications   Medication Sig Dispense Refill    cephALEXin (KEFLEX) 500 MG capsule Take 1 capsule by mouth 2 times daily for 7 days 14 capsule 0    lisinopril (PRINIVIL;ZESTRIL) 10 MG tablet Take 10 mg by mouth daily      ferrous sulfate (IRON 325) 325 (65 Fe) MG tablet Take 1 tablet by mouth 3 times daily (with meals) 90 tablet 1    ondansetron (ZOFRAN ODT) 4 MG disintegrating tablet Take 1 tablet by mouth every 8 hours as needed for Nausea or Vomiting 10 tablet 1    docusate sodium (COLACE) 100 MG capsule Take 1 capsule by mouth 2 times daily as needed for Constipation 60 capsule 1 EXAMINATION: CT OF THE ABDOMEN AND PELVIS WITH CONTRAST 1/24/2021 9:42 am TECHNIQUE: CT of the abdomen and pelvis was performed with the administration of intravenous contrast. Multiplanar reformatted images are provided for review. Dose modulation, iterative reconstruction, and/or weight based adjustment of the mA/kV was utilized to reduce the radiation dose to as low as reasonably achievable. COMPARISON: Chest CT January 22, 2020 HISTORY: ORDERING SYSTEM PROVIDED HISTORY: rlq pain, nausea TECHNOLOGIST PROVIDED HISTORY: Reason for exam:->rlq pain, nausea Additional Contrast?->None Reason for Exam: RLQ pain x 2 days Acuity: Acute Type of Exam: Initial Additional signs and symptoms: nausea,constipation FINDINGS: Lower Chest: No evidence of pneumonia or other acute findings. Organs: No acute abnormality of the organs of the abdomen. No evidence of pancreatitis. No ureteral stone or hydronephrosis. No evidence of pyelonephritis. Numerous calcified gallstones are present but no evidence of cholecystitis. Liver appears normal. GI/Bowel: No bowel obstruction or other acute process demonstrated. No evidence of colitis, diverticulitis or appendicitis. Normal appendix. Pelvis: No acute abnormality of the pelvis. No evidence of cystitis. Peritoneum/Retroperitoneum: No free air, ascites or abscess. Bones/Soft Tissues: No fracture or other acute osseous process. 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 Advanced degenerative disc disease changes L5-S1 in addition to facet degenerative changes. There is central canal stenosis in the lower lumbar spine     Negative CT examination. No evidence of acute process in the abdomen or pelvis. Nonacute findings as discussed. ED COURSE/MDM  Patient seen and evaluated. Old records reviewed. Diagnostic testing reviewed and results discussed. I have independently evaluated this patient based upon my scope of practice. Supervising physician was in the department for consultation as needed. Ric Méndez presented to the ED today with above noted complaints. Emergency department course shows acute cystitis with hematuria with trace leukocytes and 10-20 WBCs, 2+ bacteria. No acute kidney injury. Otherwise unremarkable work-up CT of the abdomen pelvis shows no evidence of acute process specifically no appendicitis, diverticulitis, colitis. No ureteral stone or hydronephrosis. No evidence of pancreatitis. LFTs and lipase unremarkable. No leukocytosis, bandemia, anemia. Patient received morphine for pain, with good relief. Upon reevaluation symptoms have improved. Patient initiated on antibiotics. Patient agreeable with plan of care. While in ED patient received   Medications   morphine (PF) injection 4 mg (4 mg Intravenous Given 1/24/21 0842)   ondansetron (ZOFRAN) injection 4 mg (4 mg Intravenous Given 1/24/21 0842)   0.9 % sodium chloride bolus (0 mLs Intravenous Stopped 1/24/21 0957)   iopamidol (ISOVUE-370) 76 % injection 75 mL (75 mLs Intravenous Given 1/24/21 0950)   cephALEXin (KEFLEX) capsule 500 mg (500 mg Oral Given 1/24/21 1026)             At this point I do not feel the patient requires further work up and it is reasonable to discharge the patient. A discussion was had with the patient and/or their surrogate regarding diagnosis, diagnostic testing results, treatment/ plan of care, and follow up. There was shared decision-making between myself as well as the patient and/or their surrogate and we are all in agreement with discharge home. There was an opportunity for questions and all questions were answered to the best of my ability and to the satisfaction of the patient and/or patient family. Patient will follow up with pcp for further evaluation/treatment. The patient was given strict return precautions as we discussed symptoms that would necessitate return to the ED. Patient will return to ED for new/worsening symptoms. The patient verbalized their understanding and agreement with the above plan. Please refer to AVS for further details regarding discharge instructions.       Results for orders placed or performed during the hospital encounter of 01/24/21   CBC Auto Differential   Result Value Ref Range    WBC 7.5 4.0 - 11.0 K/uL    RBC 5.08 4.00 - 5.20 M/uL    Hemoglobin 13.1 12.0 - 16.0 g/dL    Hematocrit 40.3 36.0 - 48.0 %    MCV 79.3 (L) 80.0 - 100.0 fL    MCH 25.7 (L) 26.0 - 34.0 pg    MCHC 32.4 31.0 - 36.0 g/dL    RDW 19.9 (H) 12.4 - 15.4 %    Platelets 503 579 - 383 K/uL    MPV 8.3 5.0 - 10.5 fL    Neutrophils % 63.4 %    Lymphocytes % 29.4 %    Monocytes % 5.4 %    Eosinophils % 1.5 %    Basophils % 0.3 %    Neutrophils Absolute 4.8 1.7 - 7.7 K/uL    Lymphocytes Absolute 2.2 1.0 - 5.1 K/uL    Monocytes Absolute 0.4 0.0 - 1.3 K/uL    Eosinophils Absolute 0.1 0.0 - 0.6 K/uL    Basophils Absolute 0.0 0.0 - 0.2 K/uL   Comprehensive Metabolic Panel w/ Reflex to MG   Result Value Ref Range    Sodium 137 136 - 145 mmol/L    Potassium reflex Magnesium 5.5 (H) 3.5 - 5.1 mmol/L    Chloride 105 99 - 110 mmol/L    CO2 24 21 - 32 mmol/L    Anion Gap 8 3 - 16    Glucose 120 (H) 70 - 99 mg/dL    BUN 10 7 - 20 mg/dL    CREATININE 0.6 0.6 - 1.1 mg/dL    GFR Non-African American >60 >60    GFR African American >60 >60    Calcium 8.9 8.3 - 10.6 mg/dL    Total Protein 7.4 6.4 - 8.2 g/dL    Alb 4.0 3.4 - 5.0 g/dL    Albumin/Globulin Ratio 1.2 1.1 - 2.2    Total Bilirubin 0.5 0.0 - 1.0 mg/dL    Alkaline Phosphatase 96 40 - 129 U/L    ALT 18 10 - 40 U/L    AST 41 (H) 15 - 37 U/L    Globulin 3.4 g/dL   Lipase   Result Value Ref Range    Lipase 24.0 13.0 - 60.0 U/L   Urinalysis Reflex to Culture Result Value Ref Range    Color, UA Straw Straw/Yellow    Clarity, UA SL CLOUDY (A) Clear    Glucose, Ur Negative Negative mg/dL    Bilirubin Urine Negative Negative    Ketones, Urine Negative Negative mg/dL    Specific Gravity, UA 1.010 1.005 - 1.030    Blood, Urine MODERATE (A) Negative    pH, UA 6.5 5.0 - 8.0    Protein, UA Negative Negative mg/dL    Urobilinogen, Urine 0.2 <2.0 E.U./dL    Nitrite, Urine Negative Negative    Leukocyte Esterase, Urine TRACE (A) Negative    Microscopic Examination YES     Urine Type not given     Urine Reflex to Culture Yes    Microscopic Urinalysis   Result Value Ref Range    WBC, UA 10-20 (A) 0 - 5 /HPF    RBC, UA 5-10 (A) 0 - 4 /HPF    Epithelial Cells, UA 21-50 (A) 0 - 5 /HPF    Bacteria, UA 2+ (A) None Seen /HPF   Potassium   Result Value Ref Range    Potassium 5.9 (H) 3.5 - 5.1 mmol/L   HCG Qualitative, Serum   Result Value Ref Range    hCG Qual Negative Detects HCG level >10 MIU/mL   Potassium   Result Value Ref Range    Potassium 3.6 3.5 - 5.1 mmol/L       I estimate there is LOW risk for ACUTE APPENDICITIS, BOWEL OBSTRUCTION, CHOLECYSTITIS, DIVERTICULITIS, INCARCERATED HERNIA, PANCREATITIS, PELVIC INFLAMMATORY DISEASE, PERFORATED BOWEL or ULCER, PREGNANCY, or TUBO-OVARIAN ABSCESS, thus I consider the discharge disposition reasonable. Also, there is no evidence or peritonitis, sepsis, or toxicity. Andree Burns and I have discussed the diagnosis and risks, and we agree with discharging home to follow-up with their primary doctor. We also discussed returning to the Emergency Department immediately if new or worsening symptoms occur. We have discussed the symptoms which are most concerning (e.g., bloody stool, fever, changing or worsening pain, vomiting) that necessitate immediate return. Final Impression    1. Acute cystitis with hematuria    2.  Right lower quadrant abdominal pain Blood pressure (!) 162/90, pulse 77, temperature 97.6 °F (36.4 °C), temperature source Oral, resp. rate 14, height 5' 5\" (1.651 m), weight 190 lb (86.2 kg), SpO2 100 %, not currently breastfeeding.mdm    Patient was sent home with a prescription for below medication/s. I did [place] patient on appropriate use of these medication. New Prescriptions    CEPHALEXIN (KEFLEX) 500 MG CAPSULE    Take 1 capsule by mouth 2 times daily for 7 days           FOLLOW UP  Luis E Lutz, APRN - CNP  [id number] [place] [address]  ED  [address] [facility] [address] [facility] [address] [phone]          DISPOSITION  Patient was discharged to home in good condition. Comment: Please note this report has been produced using speech recognition software and may contain errors related to that system including errors in grammar, punctuation, and spelling, as well as words and phrases that may be inappropriate. If there are any questions or concerns please feel free to contact the dictating provider for clarification.             Jacquelin Zavala, SHANTA - CNP  01/24/21 7189

## 2021-01-24 NOTE — ED NOTES
Discharge instructions explained to pt. Pt. Received copy of discharge instructions and prescriptions. Pt. Denies having any questions about discharge. IV removed per discharge no complications noted. Patient spouse pulling up to get patient. Patient leaving with spouse in stable condition.         Winsome Webber RN  01/24/21 110       Winsome Webber RN  01/24/21 9937

## 2021-01-25 LAB — URINE CULTURE, ROUTINE: NORMAL

## 2021-07-04 ENCOUNTER — APPOINTMENT (OUTPATIENT)
Dept: GENERAL RADIOLOGY | Age: 46
DRG: 195 | End: 2021-07-04
Payer: COMMERCIAL

## 2021-07-04 ENCOUNTER — APPOINTMENT (OUTPATIENT)
Dept: CT IMAGING | Age: 46
DRG: 195 | End: 2021-07-04
Payer: COMMERCIAL

## 2021-07-04 ENCOUNTER — HOSPITAL ENCOUNTER (INPATIENT)
Age: 46
LOS: 2 days | Discharge: HOME | DRG: 195 | End: 2021-07-06
Attending: EMERGENCY MEDICINE | Admitting: HOSPITALIST
Payer: COMMERCIAL

## 2021-07-04 DIAGNOSIS — J96.01 ACUTE RESPIRATORY FAILURE WITH HYPOXIA (HCC): ICD-10-CM

## 2021-07-04 DIAGNOSIS — R06.02 SHORTNESS OF BREATH: ICD-10-CM

## 2021-07-04 DIAGNOSIS — R07.9 CHEST PAIN, UNSPECIFIED TYPE: ICD-10-CM

## 2021-07-04 DIAGNOSIS — J18.9 PNEUMONIA OF LEFT LOWER LOBE DUE TO INFECTIOUS ORGANISM: Primary | ICD-10-CM

## 2021-07-04 LAB
A/G RATIO: 1.6 (ref 1.1–2.2)
ALBUMIN SERPL-MCNC: 4 G/DL (ref 3.4–5)
ALP BLD-CCNC: 80 U/L (ref 40–129)
ALT SERPL-CCNC: 22 U/L (ref 10–40)
ANION GAP SERPL CALCULATED.3IONS-SCNC: 11 MMOL/L (ref 3–16)
AST SERPL-CCNC: 24 U/L (ref 15–37)
BASOPHILS ABSOLUTE: 0.1 K/UL (ref 0–0.2)
BASOPHILS RELATIVE PERCENT: 1.2 %
BILIRUB SERPL-MCNC: 0.8 MG/DL (ref 0–1)
BUN BLDV-MCNC: 10 MG/DL (ref 7–20)
CALCIUM SERPL-MCNC: 8.8 MG/DL (ref 8.3–10.6)
CHLORIDE BLD-SCNC: 101 MMOL/L (ref 99–110)
CO2: 25 MMOL/L (ref 21–32)
CREAT SERPL-MCNC: 0.8 MG/DL (ref 0.6–1.1)
D DIMER: 271 NG/ML DDU (ref 0–229)
EOSINOPHILS ABSOLUTE: 0.3 K/UL (ref 0–0.6)
EOSINOPHILS RELATIVE PERCENT: 3.1 %
GFR AFRICAN AMERICAN: >60
GFR NON-AFRICAN AMERICAN: >60
GLOBULIN: 2.5 G/DL
GLUCOSE BLD-MCNC: 106 MG/DL (ref 70–99)
HCG QUALITATIVE: NEGATIVE
HCT VFR BLD CALC: 41.8 % (ref 36–48)
HEMOGLOBIN: 14.2 G/DL (ref 12–16)
INFLUENZA A: NOT DETECTED
INFLUENZA B: NOT DETECTED
LACTIC ACID, SEPSIS: 1.1 MMOL/L (ref 0.4–1.9)
LYMPHOCYTES ABSOLUTE: 1.8 K/UL (ref 1–5.1)
LYMPHOCYTES RELATIVE PERCENT: 17.8 %
MCH RBC QN AUTO: 30.6 PG (ref 26–34)
MCHC RBC AUTO-ENTMCNC: 33.9 G/DL (ref 31–36)
MCV RBC AUTO: 90.2 FL (ref 80–100)
MONOCYTES ABSOLUTE: 0.8 K/UL (ref 0–1.3)
MONOCYTES RELATIVE PERCENT: 8.3 %
NEUTROPHILS ABSOLUTE: 7.1 K/UL (ref 1.7–7.7)
NEUTROPHILS RELATIVE PERCENT: 69.6 %
PDW BLD-RTO: 13.8 % (ref 12.4–15.4)
PLATELET # BLD: 246 K/UL (ref 135–450)
PMV BLD AUTO: 7.6 FL (ref 5–10.5)
POTASSIUM REFLEX MAGNESIUM: 4.1 MMOL/L (ref 3.5–5.1)
PRO-BNP: 15 PG/ML (ref 0–124)
RBC # BLD: 4.63 M/UL (ref 4–5.2)
SARS-COV-2 RNA, RT PCR: NOT DETECTED
SODIUM BLD-SCNC: 137 MMOL/L (ref 136–145)
TOTAL PROTEIN: 6.5 G/DL (ref 6.4–8.2)
TROPONIN: <0.01 NG/ML
WBC # BLD: 10.2 K/UL (ref 4–11)

## 2021-07-04 PROCEDURE — 6360000002 HC RX W HCPCS: Performed by: EMERGENCY MEDICINE

## 2021-07-04 PROCEDURE — 84145 PROCALCITONIN (PCT): CPT

## 2021-07-04 PROCEDURE — 2700000000 HC OXYGEN THERAPY PER DAY

## 2021-07-04 PROCEDURE — 87636 SARSCOV2 & INF A&B AMP PRB: CPT

## 2021-07-04 PROCEDURE — U0005 INFEC AGEN DETEC AMPLI PROBE: HCPCS

## 2021-07-04 PROCEDURE — 85025 COMPLETE CBC W/AUTO DIFF WBC: CPT

## 2021-07-04 PROCEDURE — 85379 FIBRIN DEGRADATION QUANT: CPT

## 2021-07-04 PROCEDURE — 83605 ASSAY OF LACTIC ACID: CPT

## 2021-07-04 PROCEDURE — U0003 INFECTIOUS AGENT DETECTION BY NUCLEIC ACID (DNA OR RNA); SEVERE ACUTE RESPIRATORY SYNDROME CORONAVIRUS 2 (SARS-COV-2) (CORONAVIRUS DISEASE [COVID-19]), AMPLIFIED PROBE TECHNIQUE, MAKING USE OF HIGH THROUGHPUT TECHNOLOGIES AS DESCRIBED BY CMS-2020-01-R: HCPCS

## 2021-07-04 PROCEDURE — 80053 COMPREHEN METABOLIC PANEL: CPT

## 2021-07-04 PROCEDURE — 94761 N-INVAS EAR/PLS OXIMETRY MLT: CPT

## 2021-07-04 PROCEDURE — 2580000003 HC RX 258: Performed by: EMERGENCY MEDICINE

## 2021-07-04 PROCEDURE — 83615 LACTATE (LD) (LDH) ENZYME: CPT

## 2021-07-04 PROCEDURE — 93005 ELECTROCARDIOGRAM TRACING: CPT | Performed by: EMERGENCY MEDICINE

## 2021-07-04 PROCEDURE — 36415 COLL VENOUS BLD VENIPUNCTURE: CPT

## 2021-07-04 PROCEDURE — 2580000003 HC RX 258: Performed by: INTERNAL MEDICINE

## 2021-07-04 PROCEDURE — 1200000000 HC SEMI PRIVATE

## 2021-07-04 PROCEDURE — 86140 C-REACTIVE PROTEIN: CPT

## 2021-07-04 PROCEDURE — 83880 ASSAY OF NATRIURETIC PEPTIDE: CPT

## 2021-07-04 PROCEDURE — 84703 CHORIONIC GONADOTROPIN ASSAY: CPT

## 2021-07-04 PROCEDURE — 71260 CT THORAX DX C+: CPT

## 2021-07-04 PROCEDURE — 96361 HYDRATE IV INFUSION ADD-ON: CPT

## 2021-07-04 PROCEDURE — 87040 BLOOD CULTURE FOR BACTERIA: CPT

## 2021-07-04 PROCEDURE — 82728 ASSAY OF FERRITIN: CPT

## 2021-07-04 PROCEDURE — 6360000004 HC RX CONTRAST MEDICATION: Performed by: EMERGENCY MEDICINE

## 2021-07-04 PROCEDURE — 96365 THER/PROPH/DIAG IV INF INIT: CPT

## 2021-07-04 PROCEDURE — 96375 TX/PRO/DX INJ NEW DRUG ADDON: CPT

## 2021-07-04 PROCEDURE — 84484 ASSAY OF TROPONIN QUANT: CPT

## 2021-07-04 PROCEDURE — 71046 X-RAY EXAM CHEST 2 VIEWS: CPT

## 2021-07-04 PROCEDURE — 99284 EMERGENCY DEPT VISIT MOD MDM: CPT

## 2021-07-04 RX ORDER — DEXAMETHASONE SODIUM PHOSPHATE 10 MG/ML
10 INJECTION, SOLUTION INTRAMUSCULAR; INTRAVENOUS ONCE
Status: COMPLETED | OUTPATIENT
Start: 2021-07-04 | End: 2021-07-04

## 2021-07-04 RX ORDER — SODIUM CHLORIDE 0.9 % (FLUSH) 0.9 %
5-40 SYRINGE (ML) INJECTION EVERY 12 HOURS SCHEDULED
Status: DISCONTINUED | OUTPATIENT
Start: 2021-07-04 | End: 2021-07-06 | Stop reason: HOSPADM

## 2021-07-04 RX ORDER — DEXAMETHASONE SODIUM PHOSPHATE 10 MG/ML
6 INJECTION, SOLUTION INTRAMUSCULAR; INTRAVENOUS EVERY 24 HOURS
Status: DISCONTINUED | OUTPATIENT
Start: 2021-07-05 | End: 2021-07-06

## 2021-07-04 RX ORDER — SODIUM CHLORIDE 9 MG/ML
25 INJECTION, SOLUTION INTRAVENOUS PRN
Status: DISCONTINUED | OUTPATIENT
Start: 2021-07-04 | End: 2021-07-06 | Stop reason: HOSPADM

## 2021-07-04 RX ORDER — ACETAMINOPHEN 650 MG/1
650 SUPPOSITORY RECTAL EVERY 6 HOURS PRN
Status: DISCONTINUED | OUTPATIENT
Start: 2021-07-04 | End: 2021-07-06 | Stop reason: HOSPADM

## 2021-07-04 RX ORDER — ONDANSETRON 4 MG/1
4 TABLET, ORALLY DISINTEGRATING ORAL EVERY 8 HOURS PRN
Status: DISCONTINUED | OUTPATIENT
Start: 2021-07-04 | End: 2021-07-06 | Stop reason: HOSPADM

## 2021-07-04 RX ORDER — ONDANSETRON 2 MG/ML
4 INJECTION INTRAMUSCULAR; INTRAVENOUS EVERY 6 HOURS PRN
Status: DISCONTINUED | OUTPATIENT
Start: 2021-07-04 | End: 2021-07-06 | Stop reason: HOSPADM

## 2021-07-04 RX ORDER — SODIUM CHLORIDE 0.9 % (FLUSH) 0.9 %
5-40 SYRINGE (ML) INJECTION PRN
Status: DISCONTINUED | OUTPATIENT
Start: 2021-07-04 | End: 2021-07-06 | Stop reason: HOSPADM

## 2021-07-04 RX ORDER — ACETAMINOPHEN 325 MG/1
650 TABLET ORAL EVERY 6 HOURS PRN
Status: DISCONTINUED | OUTPATIENT
Start: 2021-07-04 | End: 2021-07-06 | Stop reason: HOSPADM

## 2021-07-04 RX ORDER — POLYETHYLENE GLYCOL 3350 17 G/17G
17 POWDER, FOR SOLUTION ORAL DAILY PRN
Status: DISCONTINUED | OUTPATIENT
Start: 2021-07-04 | End: 2021-07-06 | Stop reason: HOSPADM

## 2021-07-04 RX ORDER — LISINOPRIL 10 MG/1
10 TABLET ORAL DAILY
Status: DISCONTINUED | OUTPATIENT
Start: 2021-07-04 | End: 2021-07-06 | Stop reason: HOSPADM

## 2021-07-04 RX ORDER — IBUPROFEN 600 MG/1
600 TABLET ORAL EVERY 6 HOURS PRN
Status: DISCONTINUED | OUTPATIENT
Start: 2021-07-04 | End: 2021-07-06 | Stop reason: HOSPADM

## 2021-07-04 RX ORDER — 0.9 % SODIUM CHLORIDE 0.9 %
1000 INTRAVENOUS SOLUTION INTRAVENOUS ONCE
Status: COMPLETED | OUTPATIENT
Start: 2021-07-04 | End: 2021-07-04

## 2021-07-04 RX ORDER — DOCUSATE SODIUM 100 MG/1
100 CAPSULE, LIQUID FILLED ORAL 2 TIMES DAILY PRN
Status: DISCONTINUED | OUTPATIENT
Start: 2021-07-04 | End: 2021-07-06 | Stop reason: HOSPADM

## 2021-07-04 RX ORDER — FERROUS SULFATE 325(65) MG
325 TABLET ORAL
Status: DISCONTINUED | OUTPATIENT
Start: 2021-07-05 | End: 2021-07-06 | Stop reason: HOSPADM

## 2021-07-04 RX ORDER — SODIUM CHLORIDE 9 MG/ML
INJECTION, SOLUTION INTRAVENOUS CONTINUOUS
Status: DISCONTINUED | OUTPATIENT
Start: 2021-07-04 | End: 2021-07-04

## 2021-07-04 RX ADMIN — DEXTROSE MONOHYDRATE 500 MG: 50 INJECTION, SOLUTION INTRAVENOUS at 17:08

## 2021-07-04 RX ADMIN — SODIUM CHLORIDE 1000 ML: 9 INJECTION, SOLUTION INTRAVENOUS at 17:02

## 2021-07-04 RX ADMIN — DEXAMETHASONE SODIUM PHOSPHATE 10 MG: 10 INJECTION, SOLUTION INTRAMUSCULAR; INTRAVENOUS at 17:04

## 2021-07-04 RX ADMIN — SODIUM CHLORIDE 1000 ML: 9 INJECTION, SOLUTION INTRAVENOUS at 14:47

## 2021-07-04 RX ADMIN — SODIUM CHLORIDE: 9 INJECTION, SOLUTION INTRAVENOUS at 21:28

## 2021-07-04 RX ADMIN — IOPAMIDOL 75 ML: 755 INJECTION, SOLUTION INTRAVENOUS at 15:36

## 2021-07-04 RX ADMIN — CEFTRIAXONE SODIUM 1000 MG: 1 INJECTION, POWDER, FOR SOLUTION INTRAMUSCULAR; INTRAVENOUS at 17:01

## 2021-07-04 ASSESSMENT — PAIN DESCRIPTION - FREQUENCY: FREQUENCY: CONTINUOUS

## 2021-07-04 ASSESSMENT — PAIN DESCRIPTION - PAIN TYPE: TYPE: ACUTE PAIN

## 2021-07-04 ASSESSMENT — PAIN DESCRIPTION - DESCRIPTORS: DESCRIPTORS: PRESSURE

## 2021-07-04 ASSESSMENT — PAIN SCALES - GENERAL
PAINLEVEL_OUTOF10: 7
PAINLEVEL_OUTOF10: 0

## 2021-07-04 ASSESSMENT — PAIN DESCRIPTION - LOCATION: LOCATION: CHEST

## 2021-07-04 ASSESSMENT — PAIN DESCRIPTION - ORIENTATION: ORIENTATION: MID

## 2021-07-04 NOTE — ED NOTES
Per MD, while she was in the room pt O2 dropped to 85% with a good pleth.  Pt placed on 2L [address], RN  07/04/21 0602

## 2021-07-04 NOTE — ED NOTES
Report given to EMS  Report called to MELI Marroquin on [address].   No further needs.  Will continue to monitor       Yoder MELI Vargas  07/04/21 1921

## 2021-07-04 NOTE — ED PROVIDER NOTES
[facility] EMERGENCY DEPARTMENT      CHIEF COMPLAINT  Shortness of Breath (Sob for the past couple days), Chest Pain (midsternal chest pain that raidates to her back that started at 1800 last night. ), and Cough (started after her chest pain last night. )       HISTORY OF PRESENT ILLNESS  Andrea Linn is a 48 y.o. female  who presents to the ED complaining of significant shortness of breath over the past several days. Patient states that is associated chest tightness and pressure that feels like something is just pushing on it. She states that she has a history of pneumonia and this feels similar. She has had a cough that has been largely nonproductive although she did vomit some with the cough. Emesis has been nonbloody nonbilious and she denies any diarrhea or abdominal pain. No known fevers. No known exposures to Covid or other known sick contacts. She tried some allergy medication without improvement. Due to encouragement from her  she now presents for further evaluation. Patient is a non-smoker. She has not been vaccinated against Covid. No recent travel long distance or surgeries. She states that she has been tested now 5 times for Covid and is all been negative. Most recent test has been greater than 2 weeks ago. No other complaints, modifying factors or associated symptoms. I have reviewed the following from the nursing documentation.     Past Medical History:   Diagnosis Date    Chest pain 11/14/2013    Hypertension     Prolonged emergence from general anesthesia      Past Surgical History:   Procedure Laterality Date    ANTERIOR CRUCIATE LIGAMENT REPAIR Left     CARPAL TUNNEL RELEASE Left 07/2020    DILATION AND CURETTAGE OF UTERUS N/A 8/27/2020    VIDEO HYSTEROSCOPY DILATATION AND CURETTAGE, NOVASURE ABLATION, MYOSURE performed by Sarah Ortiz MD at [address] [address] HYSTEROSCOPY  1/23/15    polypectomy, D&C myosure    HYSTEROSCOPY  04/04/2018    KNEE ARTHROSCOPY Left syringe  1,000 mg Intravenous Q24H Hellen Leventhal, MD   Stopped at 07/04/21 4064     Current Outpatient Medications   Medication Sig Dispense Refill    lisinopril (PRINIVIL;ZESTRIL) 10 MG tablet Take 10 mg by mouth daily      ferrous sulfate (IRON 325) 325 (65 Fe) MG tablet Take 1 tablet by mouth 3 times daily (with meals) 90 tablet 1    ondansetron (ZOFRAN ODT) 4 MG disintegrating tablet Take 1 tablet by mouth every 8 hours as needed for Nausea or Vomiting 10 tablet 1    docusate sodium (COLACE) 100 MG capsule Take 1 capsule by mouth 2 times daily as needed for Constipation 60 capsule 1    ibuprofen (ADVIL;MOTRIN) 600 MG tablet Take 1 tablet by mouth every 6 hours as needed for Pain 30 tablet 1     No Known Allergies    REVIEW OF SYSTEMS  10 systems reviewed, pertinent positives per HPI otherwise noted to be negative. PHYSICAL EXAM  /76   Pulse 109   Temp 98.4 °F (36.9 °C) (Oral)   Resp 18   Ht 5' 5\" (1.651 m)   Wt 206 lb (93.4 kg)   SpO2 100%   BMI 34.28 kg/m²    GENERAL APPEARANCE: Awake and alert. Cooperative. Mild respiratory distress. HENT: Normocephalic. Atraumatic. Mucous membranes are moist.  No drooling or stridor. NECK: Supple. No cervical lymphadenopathy. No nuchal rigidity. EYES: PERRL. EOM's grossly intact. HEART/CHEST: RRR. No murmurs. 2+ radial pulses bilaterally. LUNGS: Respirations mildly labored. However, lungs are grossly clear without any noted wheezes rales or rhonchi on my exam.. Good air exchange. ABDOMEN: No tenderness. Soft. Non-distended. No masses. No organomegaly. No guarding or rebound. MUSCULOSKELETAL: No extremity edema. Compartments soft. No deformity. No tenderness in the extremities. All extremities neurovascularly intact. SKIN: Warm and dry. No acute rashes. NEUROLOGICAL: Alert and oriented. CN's 2-12 intact. No gross facial drooping. No gross focal deficits. PSYCHIATRIC: Normal mood and affect.     LABS  I have reviewed all labs for this visit.    Results for orders placed or performed during the hospital encounter of 07/04/21   CBC Auto Differential   Result Value Ref Range    WBC 10.2 4.0 - 11.0 K/uL    RBC 4.63 4.00 - 5.20 M/uL    Hemoglobin 14.2 12.0 - 16.0 g/dL    Hematocrit 41.8 36.0 - 48.0 %    MCV 90.2 80.0 - 100.0 fL    MCH 30.6 26.0 - 34.0 pg    MCHC 33.9 31.0 - 36.0 g/dL    RDW 13.8 12.4 - 15.4 %    Platelets 917 069 - 018 K/uL    MPV 7.6 5.0 - 10.5 fL    Neutrophils % 69.6 %    Lymphocytes % 17.8 %    Monocytes % 8.3 %    Eosinophils % 3.1 %    Basophils % 1.2 %    Neutrophils Absolute 7.1 1.7 - 7.7 K/uL    Lymphocytes Absolute 1.8 1.0 - 5.1 K/uL    Monocytes Absolute 0.8 0.0 - 1.3 K/uL    Eosinophils Absolute 0.3 0.0 - 0.6 K/uL    Basophils Absolute 0.1 0.0 - 0.2 K/uL   Comprehensive Metabolic Panel w/ Reflex to MG   Result Value Ref Range    Sodium 137 136 - 145 mmol/L    Potassium reflex Magnesium 4.1 3.5 - 5.1 mmol/L    Chloride 101 99 - 110 mmol/L    CO2 25 21 - 32 mmol/L    Anion Gap 11 3 - 16    Glucose 106 (H) 70 - 99 mg/dL    BUN 10 7 - 20 mg/dL    CREATININE 0.8 0.6 - 1.1 mg/dL    GFR Non-African American >60 >60    GFR African American >60 >60    Calcium 8.8 8.3 - 10.6 mg/dL    Total Protein 6.5 6.4 - 8.2 g/dL    Albumin 4.0 3.4 - 5.0 g/dL    Albumin/Globulin Ratio 1.6 1.1 - 2.2    Total Bilirubin 0.8 0.0 - 1.0 mg/dL    Alkaline Phosphatase 80 40 - 129 U/L    ALT 22 10 - 40 U/L    AST 24 15 - 37 U/L    Globulin 2.5 g/dL   Troponin   Result Value Ref Range    Troponin <0.01 <0.01 ng/mL   Brain Natriuretic Peptide   Result Value Ref Range    Pro-BNP 15 0 - 124 pg/mL   D-Dimer, Quantitative   Result Value Ref Range    D-Dimer, Quant 271 (H) 0 - 229 ng/mL DDU   HCG Qualitative, Serum   Result Value Ref Range    hCG Qual Negative Detects HCG level >10 MIU/mL   Lactate, Sepsis   Result Value Ref Range    Lactic Acid, Sepsis 1.1 0.4 - 1.9 mmol/L       ECG  The Ekg interpreted by me shows  sinus tachycardia, [id number]  Axis is   Left axis deviation  QTc is  within an acceptable range  Intervals and Durations are unremarkable. ST Segments: nonspecific changes  No significant change from prior EKG dated 1/22/20    RADIOLOGY  XR CHEST (2 VW)    Result Date: 7/4/2021  EXAMINATION: TWO XRAY VIEWS OF THE CHEST 7/4/2021 3:02 pm COMPARISON: Chest radiograph January 22, 2020 HISTORY: ORDERING SYSTEM PROVIDED HISTORY: cough, dyspnea TECHNOLOGIST PROVIDED HISTORY: Reason for exam:->cough, dyspnea Reason for Exam: Cough, SOB Acuity: Acute FINDINGS: The lungs are without acute focal process. There is no effusion or pneumothorax. The cardiomediastinal silhouette is without acute process. The osseous structures are without acute process. No acute pulmonary process     CT CHEST PULMONARY EMBOLISM W CONTRAST    Result Date: 7/4/2021  EXAMINATION: CTA OF THE CHEST 7/4/2021 3:26 pm TECHNIQUE: CTA of the chest was performed after the administration of intravenous contrast.  Multiplanar reformatted images are provided for review. MIP images are provided for review. Dose modulation, iterative reconstruction, and/or weight based adjustment of the mA/kV was utilized to reduce the radiation dose to as low as reasonably achievable. COMPARISON: CT scan of the abdomen pelvis from 01/24/2021 HISTORY: ORDERING SYSTEM PROVIDED HISTORY: dyspnea, elevated d dimer TECHNOLOGIST PROVIDED HISTORY: Reason for exam:->dyspnea, elevated d dimer Decision Support Exception - unselect if not a suspected or confirmed emergency medical condition->Emergency Medical Condition (MA) Reason for Exam: SOB, elevated d-dimer, dyspnea Acuity: Acute Type of Exam: Initial FINDINGS: Pulmonary Arteries: Pulmonary arteries are adequately opacified for evaluation. No evidence of intraluminal filling defect to suggest pulmonary embolism. Main pulmonary artery is normal in caliber. Mediastinum: No evidence of mediastinal lymphadenopathy.   The heart and pericardium demonstrate no acute abnormality. There is no acute abnormality of the thoracic aorta. Lungs/pleura: Scattered ground-glass opacities identified throughout much of the left lower lobe, compatible with pneumonia. Remainder lung fields clear. No focal consolidation or pulmonary edema. No evidence of pleural effusion or pneumothorax. Tracheobronchial tree patent. Upper Abdomen: Limited images of the upper abdomen again show multiple gallstones without CT evidence cholecystitis. No dilatation biliary tree. Soft Tissues/Bones: No acute bone or soft tissue abnormality. Heterogeneous thyroid with probable several small nodules on the right, largest likely 9-10 mm. Mild DJD spine. No evidence of pulmonary embolism. Ground-glass opacities left lower lobe suspicious for pneumonia. Imaging features are atypical or uncommonly reported for COVID-19 pneumonia. Viral and other causes remain in the differential diagnosis. Cholelithiasis without CT evidence cholecystitis. Small thyroid nodules, largest 1 cm. No follow-up required. ED COURSE/MDM  Patient seen and evaluated. Old records reviewed. Labs and imaging reviewed and results discussed with patient. Patient presenting for evaluation of shortness of breath and cough. She does have chest pressure which I suspect is likely pulmonary in etiology as EKG does not show any acute ischemic changes. Troponin negative. CT PE study shows no evidence of PE but does show left lower lobe groundglass opacities. Certainly atypical/viral pneumonia is a consideration especially possibility of Covid and this test is currently pending. I will however cover for possible bacterial cause with azithromycin and Rocephin. Blood cultures have been sent as well as lactic acid level, procalcitonin, and I will also resuscitated with IV fluids. Despite fluids she remains persistently tachycardic. For that reason I felt that inpatient admission for further evaluation is most appropriate.   While in the room discussing this, patient noted to have good waveform on the monitor and an oxygen saturation of 85 to 86%. She was placed on 2 L of oxygen to maintain her oxygen saturations in the 90s. Patient at this time will be transferred to [facility] for admission and the hospitalist has been consulted for further management. I spoke with Dr. Ina Min. We thoroughly discussed the history, physical exam, laboratory and imaging studies, as well as, emergency department course. Based upon that discussion, we've decided to admit Mariano Yohan for further observation and evaluation of Lu Landin's dyspnea. As I have deemed necessary from their history, physical, and studies, I have considered and evaluated Mariano Yohan for the following diagnoses:  ACUTE CORONARY SYNDROME, CHRONIC OBSTRUCTIVE PULMONARY DISEASE, CONGESTIVE HEART FAILURE, PERICARDIAL TAMPONADE, PNEUMONIA, PNEUMOTHORAX, PULMONARY EMBOLISM, SEPSIS, and THORACIC DISSECTION. During the patient's ED course, the patient was given:  Medications   azithromycin (ZITHROMAX) 500 mg in D5W 250ml addavial (0 mg Intravenous Stopped 7/4/21 1906)     And   cefTRIAXone (ROCEPHIN) 1,000 mg in sterile water 10 mL IV syringe (0 mg Intravenous Stopped 7/4/21 1709)   0.9 % sodium chloride bolus (0 mLs Intravenous Stopped 7/4/21 1651)   iopamidol (ISOVUE-370) 76 % injection 75 mL (75 mLs Intravenous Given 7/4/21 1536)   dexamethasone (PF) (DECADRON) injection 10 mg (10 mg Intravenous Given 7/4/21 1704)   0.9 % sodium chloride bolus (0 mLs Intravenous Stopped 7/4/21 1810)        CLINICAL IMPRESSION  1. Pneumonia of left lower lobe due to infectious organism    2. Shortness of breath    3. Acute respiratory failure with hypoxia (HCC)    4. Chest pain, unspecified type        Blood pressure 110/76, pulse 109, temperature 98.4 °F (36.9 °C), temperature source Oral, resp.  rate 18, height 5' 5\" (1.651 m), weight 206 lb (93.4 kg), SpO2 100 %, not currently breastfeeding. Samantha Fraga was admitted in stable condition. DISCLAIMER: This chart was created using Dragon dictation software. Efforts were made by me to ensure accuracy, however some errors may be present due to limitations of this technology and occasionally words are not transcribed correctly.         Omaira Tam MD  07/04/21 1368

## 2021-07-05 LAB
A/G RATIO: 1.3 (ref 1.1–2.2)
ALBUMIN SERPL-MCNC: 3.6 G/DL (ref 3.4–5)
ALP BLD-CCNC: 75 U/L (ref 40–129)
ALT SERPL-CCNC: 23 U/L (ref 10–40)
ANION GAP SERPL CALCULATED.3IONS-SCNC: 9 MMOL/L (ref 3–16)
AST SERPL-CCNC: 20 U/L (ref 15–37)
BASOPHILS ABSOLUTE: 0 K/UL (ref 0–0.2)
BASOPHILS RELATIVE PERCENT: 0.2 %
BILIRUB SERPL-MCNC: 0.4 MG/DL (ref 0–1)
BUN BLDV-MCNC: 11 MG/DL (ref 7–20)
C-REACTIVE PROTEIN: 31.2 MG/L (ref 0–5.1)
CALCIUM SERPL-MCNC: 8.4 MG/DL (ref 8.3–10.6)
CHLORIDE BLD-SCNC: 106 MMOL/L (ref 99–110)
CO2: 19 MMOL/L (ref 21–32)
CREAT SERPL-MCNC: <0.5 MG/DL (ref 0.6–1.1)
EKG ATRIAL RATE: 118 BPM
EKG DIAGNOSIS: NORMAL
EKG P AXIS: 42 DEGREES
EKG P-R INTERVAL: 122 MS
EKG Q-T INTERVAL: 326 MS
EKG QRS DURATION: 96 MS
EKG QTC CALCULATION (BAZETT): 456 MS
EKG R AXIS: -24 DEGREES
EKG T AXIS: 28 DEGREES
EKG VENTRICULAR RATE: 118 BPM
EOSINOPHILS ABSOLUTE: 0 K/UL (ref 0–0.6)
EOSINOPHILS RELATIVE PERCENT: 0 %
FERRITIN: 199.8 NG/ML (ref 15–150)
GFR AFRICAN AMERICAN: >60
GFR NON-AFRICAN AMERICAN: >60
GLOBULIN: 2.7 G/DL
GLUCOSE BLD-MCNC: 190 MG/DL (ref 70–99)
HCT VFR BLD CALC: 42.3 % (ref 36–48)
HEMOGLOBIN: 14.5 G/DL (ref 12–16)
LACTATE DEHYDROGENASE: 380 U/L (ref 100–190)
LACTIC ACID: 1.1 MMOL/L (ref 0.4–2)
LYMPHOCYTES ABSOLUTE: 0.9 K/UL (ref 1–5.1)
LYMPHOCYTES RELATIVE PERCENT: 9.9 %
MCH RBC QN AUTO: 31.5 PG (ref 26–34)
MCHC RBC AUTO-ENTMCNC: 34.2 G/DL (ref 31–36)
MCV RBC AUTO: 92.1 FL (ref 80–100)
MONOCYTES ABSOLUTE: 0.3 K/UL (ref 0–1.3)
MONOCYTES RELATIVE PERCENT: 2.8 %
NEUTROPHILS ABSOLUTE: 7.9 K/UL (ref 1.7–7.7)
NEUTROPHILS RELATIVE PERCENT: 87.1 %
PDW BLD-RTO: 13.8 % (ref 12.4–15.4)
PLATELET # BLD: 214 K/UL (ref 135–450)
PMV BLD AUTO: 7.9 FL (ref 5–10.5)
POTASSIUM REFLEX MAGNESIUM: 4.3 MMOL/L (ref 3.5–5.1)
PROCALCITONIN: 0.06 NG/ML (ref 0–0.15)
RBC # BLD: 4.6 M/UL (ref 4–5.2)
SARS-COV-2, PCR: NOT DETECTED
SODIUM BLD-SCNC: 134 MMOL/L (ref 136–145)
TOTAL PROTEIN: 6.3 G/DL (ref 6.4–8.2)
WBC # BLD: 9 K/UL (ref 4–11)

## 2021-07-05 PROCEDURE — 85025 COMPLETE CBC W/AUTO DIFF WBC: CPT

## 2021-07-05 PROCEDURE — 2700000000 HC OXYGEN THERAPY PER DAY

## 2021-07-05 PROCEDURE — 2580000003 HC RX 258: Performed by: INTERNAL MEDICINE

## 2021-07-05 PROCEDURE — 1200000000 HC SEMI PRIVATE

## 2021-07-05 PROCEDURE — 6360000002 HC RX W HCPCS: Performed by: HOSPITALIST

## 2021-07-05 PROCEDURE — 99255 IP/OBS CONSLTJ NEW/EST HI 80: CPT | Performed by: INTERNAL MEDICINE

## 2021-07-05 PROCEDURE — 80053 COMPREHEN METABOLIC PANEL: CPT

## 2021-07-05 PROCEDURE — 83605 ASSAY OF LACTIC ACID: CPT

## 2021-07-05 PROCEDURE — 2580000003 HC RX 258: Performed by: HOSPITALIST

## 2021-07-05 PROCEDURE — 99232 SBSQ HOSP IP/OBS MODERATE 35: CPT | Performed by: INTERNAL MEDICINE

## 2021-07-05 PROCEDURE — 36415 COLL VENOUS BLD VENIPUNCTURE: CPT

## 2021-07-05 PROCEDURE — 6370000000 HC RX 637 (ALT 250 FOR IP): Performed by: INTERNAL MEDICINE

## 2021-07-05 PROCEDURE — 6360000002 HC RX W HCPCS: Performed by: INTERNAL MEDICINE

## 2021-07-05 PROCEDURE — 93010 ELECTROCARDIOGRAM REPORT: CPT | Performed by: INTERNAL MEDICINE

## 2021-07-05 RX ORDER — OXYMETAZOLINE HYDROCHLORIDE 0.05 G/100ML
2 SPRAY NASAL 2 TIMES DAILY
Status: DISCONTINUED | OUTPATIENT
Start: 2021-07-05 | End: 2021-07-06 | Stop reason: HOSPADM

## 2021-07-05 RX ORDER — FLUTICASONE PROPIONATE 50 MCG
1 SPRAY, SUSPENSION (ML) NASAL DAILY
Status: DISCONTINUED | OUTPATIENT
Start: 2021-07-05 | End: 2021-07-06 | Stop reason: HOSPADM

## 2021-07-05 RX ADMIN — LISINOPRIL 10 MG: 10 TABLET ORAL at 08:26

## 2021-07-05 RX ADMIN — ACETAMINOPHEN 650 MG: 325 TABLET ORAL at 15:07

## 2021-07-05 RX ADMIN — Medication 10 ML: at 08:26

## 2021-07-05 RX ADMIN — Medication 2 SPRAY: at 12:45

## 2021-07-05 RX ADMIN — CEFTRIAXONE SODIUM 1000 MG: 1 INJECTION, POWDER, FOR SOLUTION INTRAMUSCULAR; INTRAVENOUS at 16:40

## 2021-07-05 RX ADMIN — AZITHROMYCIN MONOHYDRATE 500 MG: 500 INJECTION, POWDER, LYOPHILIZED, FOR SOLUTION INTRAVENOUS at 17:26

## 2021-07-05 RX ADMIN — SODIUM CHLORIDE 25 ML: 9 INJECTION, SOLUTION INTRAVENOUS at 16:36

## 2021-07-05 RX ADMIN — FLUTICASONE PROPIONATE 1 SPRAY: 50 SPRAY, METERED NASAL at 12:45

## 2021-07-05 RX ADMIN — DEXAMETHASONE SODIUM PHOSPHATE 6 MG: 10 INJECTION, SOLUTION INTRAMUSCULAR; INTRAVENOUS at 16:33

## 2021-07-05 ASSESSMENT — PAIN SCALES - GENERAL
PAINLEVEL_OUTOF10: 0
PAINLEVEL_OUTOF10: 7

## 2021-07-05 ASSESSMENT — PAIN DESCRIPTION - ORIENTATION: ORIENTATION: LEFT

## 2021-07-05 ASSESSMENT — PAIN DESCRIPTION - LOCATION: LOCATION: ARM

## 2021-07-05 ASSESSMENT — PAIN DESCRIPTION - PAIN TYPE: TYPE: ACUTE PAIN

## 2021-07-05 NOTE — PROGRESS NOTES
Patient admitted for possible pneumonia, r/o COVID. Patient has a non-productive cough. Patient stated it feels like pneumonia, that she has had it before. Rapid Covid/Flu was negative. PCR sent by [place] [id number]. [place]. Dr instructed to leave patient in isolation and wait on results from PCR due to chest x-ray showing covid like appearance with ground glass.

## 2021-07-05 NOTE — PROGRESS NOTES
Progress Note    Admit Date:  7/4/2021    48year old female with hypertension presented to [facility] with cough, fever, and chills. She was found to be hypoxic. Admitted to med-surg for acute hypoxic respiratory failure. In precautions for COVID rule out. COVID PCR negative. Repeat testing sent and is pending. She is 100% on 2 L. I took her off oxygen and she is 97% on room air. Subjective:  Ms. Francesco Lynn is feeling better. Objective:   Patient Vitals for the past 4 hrs:   BP Temp Temp src Pulse Resp SpO2   07/05/21 0757 126/81 98.1 °F (36.7 °C) Oral 92 18 98 %        No intake or output data in the 24 hours ending 07/05/21 0929    Physical Exam:    Gen: No distress. Alert. Eyes: PERRL. No sclera icterus. No conjunctival injection. ENT: No discharge. Pharynx clear. Neck: No JVD. No Carotid Bruit. Trachea midline. Resp: No accessory muscle use. No crackles. No wheezes. No rhonchi. CV: Regular rate. Regular rhythm. No murmur. No rub. No edema. Capillary Refill: Brisk,< 3 seconds   Peripheral Pulses: +2 palpable, equal bilaterally   GI: Non-tender. Non-distended. No masses. No organomegaly. Normal bowel sounds. No hernia. Skin: Warm and dry. No nodule on exposed extremities. No rash on exposed extremities. M/S: No cyanosis. No joint deformity. No clubbing. Neuro: Awake. Grossly nonfocal    Psych: Oriented x 3. No anxiety or agitation      Data:  CBC:   Recent Labs     07/04/21  1430 07/05/21 0512   WBC 10.2 9.0   HGB 14.2 14.5   HCT 41.8 42.3   MCV 90.2 92.1    214     BMP:   Recent Labs     07/04/21 1430 07/05/21 0512    134*   K 4.1 4.3    106   CO2 25 19*   BUN 10 11   CREATININE 0.8 <0.5*     LIVER PROFILE:   Recent Labs     07/04/21 1430 07/05/21 0512   AST 24 20   ALT 22 23   BILITOT 0.8 0.4   ALKPHOS 80 75     PT/INR: No results for input(s): PROTIME, INR in the last 72 hours.     CULTURES  COVID PCR/Influenza: not detected  Sputum: pending   Repeat COVID PCR: pending    RADIOLOGY  CT CHEST PULMONARY EMBOLISM W CONTRAST   Final Result   No evidence of pulmonary embolism. Ground-glass opacities left lower lobe suspicious for pneumonia. Imaging   features are atypical or uncommonly reported for COVID-19 pneumonia. Viral   and other causes remain in the differential diagnosis. Cholelithiasis without CT evidence cholecystitis. Small thyroid nodules, largest 1 cm. No follow-up required. XR CHEST (2 VW)   Final Result   No acute pulmonary process               Assessment/Plan:  Acute hypoxic respiratory failure at time of admission. Pneumonia, gram positive organism  - admitted to med-surg.   - supplemental O2. Wean as tolerated  - on 2 L O2  - CT with GGO in the LLL suspicious for pneumonia. Could be viral.   - Repeat COVID PCR pending   - pulmonology consult.    - Rocephin, Zithromax D#2  - Decadron D#2    Hypertension  - BP stable. Continue Lisinopril. Incidental cholelithiasis. - no issues. DVT Prophylaxis: Lovenox 30 mg BID  Diet: ADULT DIET; Regular  Code Status: Full Code    Will await repeat PCR testing.       Bimal Lopes MD 7/5/2021 9:40 AM

## 2021-07-05 NOTE — PROGRESS NOTES
IV site to [facility] was causing pain. Slowed IV down and still causing pain at site. Tried starting new IV and they blew when flushed. Charge nurse attempted unsucessfull as well.   Call out to clinical. IV zithromax paused

## 2021-07-05 NOTE — H&P
Hospital Medicine History & Physical      PCP: SHANTA Miller - CNP    Date of Admission: 7/4/2021    Date of Service: Pt seen/examined on 7/4/2021 and Admitted to Inpatient with expected LOS greater than two midnights due to medical therapy. Chief Complaint:  Cough, sob      History Of Present Illness:       48 y.o. female here with a non productive cough onset last night with fever, chills. She denies chest pain, headache, change in taste/smell, gi complaint. She went to work at [employer], however due to persistent / worsening cough she presented to the ER. She was found to be hypoxemic, currently on O2 per NC, denies worsening sob, currently in no respiratory distress. Past Medical History:          Diagnosis Date    Chest pain 11/14/2013    Hypertension     Prolonged emergence from general anesthesia        Past Surgical History:          Procedure Laterality Date    ANTERIOR CRUCIATE LIGAMENT REPAIR Left     CARPAL TUNNEL RELEASE Left 07/2020    DILATION AND CURETTAGE OF UTERUS N/A 8/27/2020    VIDEO HYSTEROSCOPY DILATATION AND CURETTAGE, NOVASURE ABLATION, MYOSURE performed by Alberto Garcia MD at [address] [place] [address] HYSTEROSCOPY  1/23/15    polypectomy, D&C myosure    HYSTEROSCOPY  04/04/2018    KNEE ARTHROSCOPY Left     X 2    WRIST GANGLION EXCISION Left        Medications Prior to Admission:      Prior to Admission medications    Medication Sig Start Date End Date Taking? Authorizing Provider   lisinopril (PRINIVIL;ZESTRIL) 10 MG tablet Take 10 mg by mouth daily   Yes Historical Provider, MD       Allergies:  Patient has no known allergies. Social History:           TOBACCO:   reports that she has never smoked. She has never used smokeless tobacco.  ETOH:   reports no history of alcohol use.       Family History:             Problem Relation Age of Onset    High Blood Pressure Mother     Diabetes Father     Heart Attack Father     High Blood Pressure Father        REVIEW OF SYSTEMS:   Pertinent positives as noted in the HPI. All other systems reviewed and negative. PHYSICAL EXAM PERFORMED:    /78   Pulse 99   Temp 98.1 °F (36.7 °C) (Oral)   Resp 18   Ht 5' 5\" (1.651 m)   Wt 205 lb 12.8 oz (93.4 kg)   SpO2 95%   BMI 34.25 kg/m²     General appearance:  No apparent distress, appears stated age and cooperative. HEENT:  Normal cephalic, atraumatic without obvious deformity. Pupils equal, round, and reactive to light. Conjunctivae/corneas clear. Neck: Supple, with full range of motion. No jugular venous distention. Trachea midline. Respiratory:  Normal respiratory effort, no use of accessory muscles, no intercostal retractions  Cardiovascular:  Regular rate and rhythm, no ectopy  Abdomen: Soft, non-tender, non-distended   Musculoskeletal:  No clubbing, cyanosis or edema bilaterally. No calf tenderness  Skin: Skin color, texture, turgor normal.     Neurologic:  Neurovascularly intact without any focal sensory/motor deficits. Cranial nerves: II-XII intact, grossly non-focal.  Psychiatric:  Alert and oriented, thought content appropriate, normal insight  Capillary Refill: Brisk,< 3 seconds   Peripheral Pulses: +2 palpable, equal bilaterally       Labs:     Recent Labs     07/04/21  1430   WBC 10.2   HGB 14.2   HCT 41.8        Recent Labs     07/04/21  1430      K 4.1      CO2 25   BUN 10   CREATININE 0.8   CALCIUM 8.8     Recent Labs     07/04/21  1430   AST 24   ALT 22   BILITOT 0.8   ALKPHOS 80     No results for input(s): INR in the last 72 hours.   Recent Labs     07/04/21  1430   TROPONINI <0.01       Urinalysis:      Lab Results   Component Value Date    NITRU Negative 01/24/2021    WBCUA 10-20 01/24/2021    BACTERIA 2+ 01/24/2021    RBCUA 5-10 01/24/2021    BLOODU MODERATE 01/24/2021    SPECGRAV 1.010 01/24/2021    GLUCOSEU Negative 01/24/2021       Radiology:        CT CHEST PULMONARY EMBOLISM W CONTRAST   Final Result   No evidence of pulmonary embolism. Ground-glass opacities left lower lobe suspicious for pneumonia. Imaging   features are atypical or uncommonly reported for COVID-19 pneumonia. Viral   and other causes remain in the differential diagnosis. Cholelithiasis without CT evidence cholecystitis. Small thyroid nodules, largest 1 cm. No follow-up required. XR CHEST (2 VW)   Final Result   No acute pulmonary process             ASSESSMENT:    Active Hospital Problems    Diagnosis Date Noted    Pneumonia [J18.9] 07/04/2021    Acute respiratory failure with hypoxia ([facility] [address] [J96.01] 07/04/2021    Hypertension [I10]          PLAN:      1) Hypoxemia  - neg rapid covid, starting on cap covearge, still strongly suspect covid, started on decadron will continue    2) HTN  - continue lisinopril      DVT Prophylaxis: lovenox bid  Diet: ADULT DIET; Regular  Code Status: Full Code       Tressa Brody MD    Thank you SHANTA Schulte - TABITHA for the opportunity to be involved in this patient's care. If you have any questions or concerns please feel free to contact me at [phone].

## 2021-07-05 NOTE — FLOWSHEET NOTE
07/05/21 0757   Vital Signs   Temp 98.1 °F (36.7 °C)   Temp Source Oral   Pulse 92   Heart Rate Source Monitor   Resp 18   /81   BP Location Right upper arm   Patient Position Semi fowlers   Level of Consciousness Alert (0)   MEWS Score 1   Patient Currently in Pain No   Pain Assessment   Pain Assessment 0-10   Pain Level 0   Oxygen Therapy   SpO2 98 %   O2 Device Nasal cannula   O2 Flow Rate (L/min) 2 L/min     Alert and oriented x4. Skin w/d resp e/e unlabored. Dry cough non productive. Patient aware if productive we need a sputum culture. Patient is independent with transfers. Continues IV atb therapy. No s/s distress noted. Call light in easy reach. Denies pain.

## 2021-07-05 NOTE — CARE COORDINATION
Case Management Assessment  Initial Evaluation      Patient Name: Swetha Mueller  YOB: 1975  Diagnosis: Pneumonia [J18.9]  Date / Time: 7/4/2021  2:21 PM    Admission status/Date: 07/04/2021 Inpatient   Chart Reviewed: Yes      Patient Interviewed: Yes   Family Interviewed:  No      Hospitalization in the last 30 days:  No      Health Care Decision Maker :   Primary Decision Maker: Isrrael Landin - Spouse - [phone]    (CM - must 1st enter selection under Navigator - emergency contact- [place] Relationship and pick relationship)   Who do you trust or have selected to make healthcare decisions for you      Met with: pt   Interview conducted  (bedside/phone): phone call due to isolation precautions listed in epic    Current PCP: LEONEL Noel 22 required for SNF : Y         3 night stay required -  N    ADLS  Support Systems/Care Needs: Spouse/Significant Other  Transportation: self    Meal Preparation: self    Housing  Living Arrangements: Pt lives at home with her family  Steps: None  Intent for return to present living arrangements: Yes  Identified Issues: [address] [place] [address] with [address] : No Agency:(Services)  Type of Home Care Services: None  Passport/Waiver : No  :                      Phone Number:    Passport/Waiver Services: n/a          Durable Medical Equiptment   DME Provider: n/a  Equipment:   Walker___Cane___RTS___ BSC___Shower Chair___Hospital Bed___W/C____Other________  02 at ____Liter(s)---wears(frequency)_______ HHN ___ CPAP___ BiPap___   N/A__x__      Home O2 Use :  No    If No for home O2---if presently on O2 during hospitalization:  Yes  if yes CM to follow for potential DC O2 need  Informed of need for care provider to bring portable home O2 tank on day of discharge for nursing to connect prior to leaving:   Not Indicated  Verbalized agreement/Understanding:   Not Indicated    Community Service Affiliation  Dialysis:  No    · Agency:  · Location:  · Dialysis Schedule:  · Phone:   · Fax: Other Community Services: n/a    DISCHARGE PLAN: Explained Case Management role/services. Chart review completed. Called and spoke with pt via call to bedside phone due to isolation precautions in [facility]. Pt stated she is independent at home and works full time at [employer] [place]. She plans on returning home when discharged. She has no current HHC, DME, o2, or community services. She denied needs or questions for CM at this time. CM will follow as pt is requiring o2 and doesn't have this at home. Please notify CM if needs or concerns arise.

## 2021-07-05 NOTE — CONSULTS
Patient is being seen at the request of Everlean Fleischer, MD   for a consultation for pneumonia rule out Covid    HISTORY OF PRESENT ILLNESS:   48years old with history of hypertension presented shortness of breath for several days. Mild to moderate. Dyspnea worse with exertion and better with resting. Associated with dry cough. Fever and chills. + green nasal discharge. Denied any chest pain. In ER found to be hypoxemic. Has not been vaccinated for Covid. Never smoked. No recent covid exposure. No O2 or IBD at home. No lung disease. Hypoxemic and has been requiring 2 L O2 to keep sat above 88%. PAST MEDICAL HISTORY:  Past Medical History:   Diagnosis Date    Chest pain 11/14/2013    Hypertension     Prolonged emergence from general anesthesia      PAST SURGICAL HISTORY:  Past Surgical History:   Procedure Laterality Date    ANTERIOR CRUCIATE LIGAMENT REPAIR Left     CARPAL TUNNEL RELEASE Left 07/2020    DILATION AND CURETTAGE OF UTERUS N/A 8/27/2020    VIDEO HYSTEROSCOPY DILATATION AND CURETTAGE, Membreno Speaks performed by Jose Barcenas MD at [address] [place] [address] HYSTEROSCOPY  1/23/15    polypectomy, D&C myosure    HYSTEROSCOPY  04/04/2018    KNEE ARTHROSCOPY Left     X 2    WRIST GANGLION EXCISION Left        FAMILY HISTORY:  family history includes Diabetes in her father; Heart Attack in her father; High Blood Pressure in her father and mother. SOCIAL HISTORY:   reports that she has never smoked.  She has never used smokeless tobacco.    Scheduled Meds:   ferrous sulfate  325 mg Oral TID WC    lisinopril  10 mg Oral Daily    sodium chloride flush  5-40 mL Intravenous 2 times per day    dexamethasone  6 mg Intravenous Q24H    cefTRIAXone (ROCEPHIN) IV  1,000 mg Intravenous Q24H    azithromycin  500 mg Intravenous Q24H    enoxaparin  30 mg Subcutaneous BID     Continuous Infusions:   sodium chloride       PRN Meds:  ibuprofen, docusate sodium, sodium chloride flush, sodium chloride, ondansetron **OR** ondansetron, polyethylene glycol, acetaminophen **OR** acetaminophen    ALLERGIES:  Patient has No Known Allergies. REVIEW OF SYSTEMS:  Constitutional: + fever  HENT: Negative for sore throat  Eyes: Negative for redness   Respiratory: + dyspnea, cough  Cardiovascular: Negative for chest pain  Gastrointestinal: Negative for vomiting, diarrhea   Genitourinary: Negative for hematuria   Musculoskeletal: Negative for arthralgias   Skin: Negative for rash  Neurological: Negative for syncope  Hematological: Negative for adenopathy  Psychiatric/Behavorial: Negative for anxiety    PHYSICAL EXAM:  Blood pressure (!) 139/91, pulse 97, temperature 96.7 °F (35.9 °C), temperature source Oral, resp. rate 18, height 5' 5\" (1.651 m), weight 205 lb 12.8 oz (93.4 kg), SpO2 98 %, not currently breastfeeding.' on RA  Gen: No distress. Eyes: PERRL. No sclera icterus. No conjunctival injection. ENT: No discharge. Pharynx clear. Neck: Trachea midline. No obvious mass. Resp: No accessory muscle use. Few left crackles. No wheezes. No rhonchi. No dullness on percussion. CV: Regular rate. Regular rhythm. No murmur or rub. No edema. GI: Non-tender. Non-distended. No hernia. Skin: Warm and dry. No nodule on exposed extremities. Lymph: No cervical LAD. No supraclavicular LAD. M/S: No cyanosis. No joint deformity. No clubbing. Neuro: Awake. Alert. Moves all four extremities. Psych: Oriented x 3. No anxiety. LABS:  CBC:   Recent Labs     07/04/21  1430 07/05/21 0512   WBC 10.2 9.0   HGB 14.2 14.5   HCT 41.8 42.3   MCV 90.2 92.1    214     BMP:   Recent Labs     07/04/21  1430 07/05/21 0512    134*   K 4.1 4.3    106   CO2 25 19*   BUN 10 11   CREATININE 0.8 <0.5*     LIVER PROFILE:   Recent Labs     07/04/21  1430 07/05/21 0512   AST 24 20   ALT 22 23   BILITOT 0.8 0.4   ALKPHOS 80 75     PT/INR: No results for input(s): PROTIME, INR in the last 72 hours.   APTT: No results for input(s): APTT in the last 72 hours. UA:No results for input(s): NITRITE, COLORU, PHUR, LABCAST, WBCUA, RBCUA, MUCUS, TRICHOMONAS, YEAST, BACTERIA, CLARITYU, SPECGRAV, LEUKOCYTESUR, UROBILINOGEN, BILIRUBINUR, BLOODU, GLUCOSEU, AMORPHOUS in the last 72 hours. Invalid input(s): KETONESU  No results for input(s): PHART, [id number], PO2ART in the last 72 hours. CTPA 7/4 imaging was reviewed by me and showed   No evidence of pulmonary embolism. Ground-glass opacities left lower lobe suspicious for pneumonia. ASSESSMENT:  · Acute hypoxemic respiratory failure  · LLL Community-acquired pneumonia-atypical versus viral  · Acute sinusitis  · Abnormal CT chest 7/4-likely atypical pneumonia including viral.    PLAN:  Supplemental oxygen to maintain SaO2 >92%; wean as tolerated  Droplet plus isolation (surgical mask, eye protection, gown, glove)  IV antibiotics to include ceftriaxone and Zithromax  Blood culture and sputum culture  Moved to negative pressure room in case needs aerosolized procedure  Hold COVID-19 specific treatments pending PCR  Topical decongestants; Afrin 2 nasal sprays/nostril BID for 3 days. Intranasal glucocorticoids; Flonase 2 sprays/nostril daily.   Discussed with internal medicine  -

## 2021-07-05 NOTE — PROGRESS NOTES
4 Eyes Skin Assessment     The patient is being assess for   Admission    I agree that 2 RN's have performed a thorough Head to Toe Skin Assessment on the patient. ALL assessment sites listed below have been assessed. Areas assessed by both nurses:   [x]   Head, Face, and Ears   [x]   Shoulders, Back, and Chest, Abdomen  [x]   Arms, Elbows, and Hands   [x]   Coccyx, Sacrum, and Ischium  [x]   Legs, Feet, and Heels        No issues noted upon assessment. Co-signer eSignature: Electronically signed by Tomasz Howard RN on 7/5/21 at 3:50 AM EDT    Does the Patient have Skin Breakdown?   No          Nikos Prevention initiated:  No   Wound Care Orders initiated:  No      WOC nurse consulted for Pressure Injury (Stage 3,4, Unstageable, DTI, NWPT, Complex wounds)and New or Established Ostomies:  No      Primary Nurse eSignature: Electronically signed by Omega Shine RN on 7/4/21 at 10:36 PM EDT

## 2021-07-05 NOTE — ACP (ADVANCE CARE PLANNING)
Advance Care Planning   Healthcare Decision Maker:    Primary Decision Maker: Isrrael Landin - [facility] - [phone]    Click here to complete Healthcare Decision Makers including selection of the Healthcare Decision Maker Relationship (ie \"Primary\").

## 2021-07-05 NOTE — PROGRESS NOTES
Consult has been called to Dr. Yeni Bateman on 7/5/21. Spoke with rasheeda.  7:44 AM    Ady Estrada  7/5/2021

## 2021-07-05 NOTE — PLAN OF CARE
Problem: SAFETY  Goal: Free from accidental physical injury  7/5/2021 1124 by Anselmo Mcduffie RN  Outcome: Ongoing  7/4/2021 2232 by Aminta Light RN  Outcome: Ongoing  Goal: Free from intentional harm  7/5/2021 1124 by Anselmo Mcduffie RN  Outcome: Ongoing  7/4/2021 2232 by Aminta Light RN  Outcome: Ongoing     Problem: DAILY CARE  Goal: Daily care needs are met  7/5/2021 1124 by Anselmo Mcduffie RN  Outcome: Ongoing  7/4/2021 2232 by Aminta Light RN  Outcome: Ongoing     Problem: PAIN  Goal: Patient's pain/discomfort is manageable  7/5/2021 1124 by Anselmo Mcduffie RN  Outcome: Ongoing  7/4/2021 2232 by Aminta Light RN  Outcome: Ongoing     Problem: SKIN INTEGRITY  Goal: Skin integrity is maintained or improved  7/5/2021 1124 by Anselmo Mcduffie RN  Outcome: Ongoing  7/4/2021 2232 by Aminta Light RN  Outcome: Ongoing     Problem: KNOWLEDGE DEFICIT  Goal: Patient/S.O. demonstrates understanding of disease process, treatment plan, medications, and discharge instructions.   7/5/2021 1124 by Anselmo Mcduffie RN  Outcome: Ongoing  7/4/2021 2232 by Aminta Light RN  Outcome: Ongoing     Problem: DISCHARGE BARRIERS  Goal: Patient's continuum of care needs are met  7/5/2021 1124 by Anselmo Mcduffie RN  Outcome: Ongoing  7/4/2021 2232 by Aminta Light RN  Outcome: Ongoing

## 2021-07-06 VITALS
WEIGHT: 205.8 LBS | SYSTOLIC BLOOD PRESSURE: 118 MMHG | RESPIRATION RATE: 16 BRPM | DIASTOLIC BLOOD PRESSURE: 81 MMHG | HEIGHT: 65 IN | OXYGEN SATURATION: 95 % | BODY MASS INDEX: 34.29 KG/M2 | TEMPERATURE: 96.6 F | HEART RATE: 85 BPM

## 2021-07-06 PROBLEM — J96.01 ACUTE RESPIRATORY FAILURE WITH HYPOXIA (HCC): Status: RESOLVED | Noted: 2021-07-04 | Resolved: 2021-07-06

## 2021-07-06 PROCEDURE — 99233 SBSQ HOSP IP/OBS HIGH 50: CPT | Performed by: INTERNAL MEDICINE

## 2021-07-06 PROCEDURE — 99238 HOSP IP/OBS DSCHRG MGMT 30/<: CPT | Performed by: INTERNAL MEDICINE

## 2021-07-06 PROCEDURE — 6370000000 HC RX 637 (ALT 250 FOR IP): Performed by: INTERNAL MEDICINE

## 2021-07-06 PROCEDURE — 6360000002 HC RX W HCPCS: Performed by: INTERNAL MEDICINE

## 2021-07-06 RX ORDER — LEVOFLOXACIN 750 MG/1
750 TABLET ORAL DAILY
Qty: 7 TABLET | Refills: 0 | Status: SHIPPED
Start: 2021-07-07 | End: 2021-07-14

## 2021-07-06 RX ORDER — LEVOFLOXACIN 750 MG/1
750 TABLET ORAL DAILY
Qty: 7 TABLET | Refills: 0 | Status: SHIPPED | OUTPATIENT
Start: 2021-07-08 | End: 2021-07-06

## 2021-07-06 RX ORDER — LEVOFLOXACIN 750 MG/1
750 TABLET ORAL DAILY
Status: DISCONTINUED | OUTPATIENT
Start: 2021-07-06 | End: 2021-07-06 | Stop reason: HOSPADM

## 2021-07-06 RX ADMIN — ENOXAPARIN SODIUM 40 MG: 40 INJECTION SUBCUTANEOUS at 08:08

## 2021-07-06 RX ADMIN — FLUTICASONE PROPIONATE 1 SPRAY: 50 SPRAY, METERED NASAL at 08:08

## 2021-07-06 RX ADMIN — LISINOPRIL 10 MG: 10 TABLET ORAL at 08:08

## 2021-07-06 RX ADMIN — LEVOFLOXACIN 750 MG: 750 TABLET, FILM COATED ORAL at 09:03

## 2021-07-06 NOTE — DISCHARGE SUMMARY
Name:  Marilynn Koenig  Room:  7924/2695-95  MRN:    [id number]    Discharge Summary      This discharge summary is in conjunction with a complete physical exam done on the day of discharge. Discharging Physician: Dr. Radha Husain: 7/4/2021  Discharge:   7/6/2021     HPI taken from admission H&P:    48 y.o. female here with a non productive cough onset last night with fever, chills. She denies chest pain, headache, change in taste/smell, gi complaint. She went to work at [employer], however due to persistent / worsening cough she presented to the ER. She was found to be hypoxemic, currently on O2 per NC, denies worsening sob, currently in no respiratory distress. Diagnoses this Admission and Hospital Course     Acute hypoxic respiratory failure - noted at time of admission. Pneumonia, gram positive organism  - required 2 L O2 on admit - weaned to RA   - CT with GGO in the LLL suspicious for pneumonia. Could be viral.   - COVID PCR x 2 are negative. She has not been vaccinated. - pulmonology consulted - appreciate recs     - Rocephin, Zithromax administered during admit-> dc on Levaquin to complete course   - Decadron given during admission, will not rx on discharge      Hypertension  - BP stable. Continue Lisinopril.      Incidental cholelithiasis. - she was informed of this finding. She denies any abd symptoms. Should she develop future abdominal symptoms she will need gen surg referral- she will f/u with PCP     Procedures (Please Review Full Report for Details)  None     Consults    Pulmonary       Physical Exam at Discharge:    BP (!) 151/88   Pulse 87   Temp 97 °F (36.1 °C) (Oral)   Resp 14   Ht 5' 5\" (1.651 m)   Wt 205 lb 12.8 oz (93.4 kg)   SpO2 96%   BMI 34.25 kg/m²     Gen: No distress. Alert. Eyes: PERRL. No sclera icterus. No conjunctival injection. ENT: No discharge. Pharynx clear. Neck: No JVD. Trachea midline. Resp: No accessory muscle use. No crackles. No wheezes.  No rhonchi. CV: Regular rate. Regular rhythm. No murmur. No rub. No edema. GI: Non-tender. Non-distended . Normal bowel sounds. Skin: Warm and dry. No nodule on exposed extremities. No rash on exposed extremities. M/S: No cyanosis. No joint deformity. No clubbing. Neuro: Awake. Grossly nonfocal    Psych: Oriented x 3. No anxiety or agitation      I Lester Borja MD have reviewed the chart on [facility] and personally interviewed and examined patient, reviewed the data (labs and imaging) and after discussion with my PA formulated the plan. Agree with note with the following edits. HPI:     I reviewed the patient's Past Medical History, Past Surgical History, Medications, and Allergies. No new complaints  Wishes to go home  No fevers, remains off oxygen     General:  Middle aged female, healthy appearing ambulating in room  Awake, alert and oriented. Appears to be not in any distress  Mucous Membranes:  Pink , anicteric  Neck: No JVD, no carotid bruit, no thyromegaly  Chest:  Clear to auscultation bilaterally, no added sounds  Cardiovascular:  RRR S1S2 heard, no murmurs or gallops  Abdomen:  Soft, undistended, non tender, no organomegaly, BS present  Extremities: No edema or cyanosis. Distal pulses well felt  Neurological : grossly normal              CBC:   Recent Labs     07/04/21  1430 07/05/21  0512   WBC 10.2 9.0   HGB 14.2 14.5   HCT 41.8 42.3   MCV 90.2 92.1    214     BMP:   Recent Labs     07/04/21  1430 07/05/21  0512    134*   K 4.1 4.3    106   CO2 25 19*   BUN 10 11   CREATININE 0.8 <0.5*     LIVER PROFILE:   Recent Labs     07/04/21  1430 07/05/21  0512   AST 24 20   ALT 22 23   BILITOT 0.8 0.4   ALKPHOS 80 75     PT/INR: No results for input(s): PROTIME, INR in the last 72 hours. APTT: No results for input(s): APTT in the last 72 hours.   UA:No results for input(s): NITRITE, COLORU, PHUR, LABCAST, WBCUA, RBCUA, MUCUS, TRICHOMONAS, YEAST, BACTERIA, CLARITYU, Cinderella Irons, UROBILINOGEN, BILIRUBINUR, BLOODU, GLUCOSEU, AMORPHOUS in the last 72 hours. Invalid input(s): KETONESU        CULTURES  COVID 19, PCR: neg  COVID 19 and influenza combo- PCR: all negative  Blood: NG    RADIOLOGY  CT CHEST PULMONARY EMBOLISM W CONTRAST   Final Result   No evidence of pulmonary embolism. Ground-glass opacities left lower lobe suspicious for pneumonia. Imaging   features are atypical or uncommonly reported for COVID-19 pneumonia. Viral   and other causes remain in the differential diagnosis. Cholelithiasis without CT evidence cholecystitis. Small thyroid nodules, largest 1 cm. No follow-up required. XR CHEST (2 VW)   Final Result   No acute pulmonary process               Discharge Medications     Medication List      START taking these medications    levoFLOXacin 750 MG tablet  Commonly known as: LEVAQUIN  Take 1 tablet by mouth daily for 7 days  Start taking on: July 7, 2021        Jimmye Crigler taking these medications    lisinopril 10 MG tablet  Commonly known as: PRINIVIL;ZESTRIL           Where to Get Your Medications      Information about where to get these medications is not yet available    Ask your nurse or doctor about these medications  · levoFLOXacin 750 MG tablet           Discharged in stable condition to home     Follow Up:   Follow up with PCP in 1 week    [employer] PA-C  7/6/2021 11:35 AM      Agree with above  Changes made to note    Lloyd Viera MD, MD 7/6/2021 1:27 PM

## 2021-07-06 NOTE — PROGRESS NOTES
Pulmonary Progress Note    CC: Pneumonia    Subjective:   Room air  Appears comfortable  Sinus congestion is better      Intake/Output Summary (Last 24 hours) at 7/6/2021 0715  Last data filed at 7/5/2021 1825  Gross per 24 hour   Intake 974.36 ml   Output --   Net 974.36 ml       Exam:   /78   Pulse 84   Temp 97.3 °F (36.3 °C) (Oral)   Resp 16   Ht 5' 5\" (1.651 m)   Wt 205 lb 12.8 oz (93.4 kg)   SpO2 96%   BMI 34.25 kg/m²  on room air  Gen: No distress. Eyes: PERRL. No sclera icterus. No conjunctival injection. ENT: No discharge. Pharynx clear. Neck: Trachea midline. No obvious mass. Resp: No accessory muscle use. Few left crackles. No wheezes. No rhonchi. No dullness on percussion. CV: Regular rate. Regular rhythm. No murmur or rub. No edema. GI: Non-tender. Non-distended. No hernia. Skin: Warm and dry. No nodule on exposed extremities. Lymph: No cervical LAD. No supraclavicular LAD. M/S: No cyanosis. No joint deformity. No clubbing. Neuro: Awake. Alert. Moves all four extremities. Psych: Oriented x 3. No anxiety.    Scheduled Meds:   oxymetazoline  2 spray Each Nostril BID    fluticasone  1 spray Each Nostril Daily    enoxaparin  40 mg Subcutaneous Daily    ferrous sulfate  325 mg Oral TID WC    lisinopril  10 mg Oral Daily    sodium chloride flush  5-40 mL Intravenous 2 times per day    dexamethasone  6 mg Intravenous Q24H    cefTRIAXone (ROCEPHIN) IV  1,000 mg Intravenous Q24H    azithromycin  500 mg Intravenous Q24H     Continuous Infusions:   sodium chloride 100 mL/hr at 07/05/21 1951     PRN Meds:  ibuprofen, docusate sodium, sodium chloride flush, sodium chloride, ondansetron **OR** ondansetron, polyethylene glycol, acetaminophen **OR** acetaminophen    Labs:  CBC:   Recent Labs     07/04/21  1430 07/05/21  0512   WBC 10.2 9.0   HGB 14.2 14.5   HCT 41.8 42.3   MCV 90.2 92.1    214     BMP:   Recent Labs     07/04/21  1430 07/05/21  0512    134*   K

## 2021-07-06 NOTE — PROGRESS NOTES
Pt resting with eyes closed, respirs witnessed as e/e, no signs of distress. SR up x2, bed in lowest  position, wheels locked. Call light and bedside table in easy reach.    Corby Garcia, RN, RN

## 2021-07-06 NOTE — PROGRESS NOTES
Pt resting with eyes closed, respirs witnessed as e/e, no signs of distress. SR up x2, bed in lowest  position, wheels locked. Call light and bedside table in easy reach.    Menomineecharlie Delarosaus, RN, RN

## 2021-07-06 NOTE — PROGRESS NOTES
Pt stuck multiple times on days shift to gain IV acess, clinical was unsuccessful as well. Pt refusing to for any more attempts to made at this time. Doc on call messaged so that they are aware of this.

## 2021-07-06 NOTE — PROGRESS NOTES
AM assessment completed, see flow sheet. Pt is alert and oriented. Vital signs are WNL. Respirations are even & easy on RA. No complaints voiced stating feels better and hopeful to \"go home\". Discussed with Dr. Susanne Benedict r/t lack IV site and didn't finish IV antibiotic last night, changed to PO. Pt educated verbal and given written handout r/t new medication on Levaquin. Offered pt to speak with unit pharmacist but pt felt confident with provided education and took Am dose. Pt denies needs at this time. SR up x 2, and bed in low position. Call light is within reach.

## 2021-07-07 NOTE — PROGRESS NOTES
Physician Progress Note      Avis Ríos  CSN #:                  [id number]  :                       1975  ADMIT DATE:       2021 2:21 PM  [address] [place] DATE:        2021 2:50 PM  RESPONDING  PROVIDER #:        Matthew Quesada PA-C          QUERY TEXT:    Patient admitted with pneumonia. Noted documentation of acute respiratory   failure in H&P. In order to support the diagnosis of acute respiratory   failure, please include additional clinical indicators in your documentation. Or please document if the diagnosis of acute respiratory failure has been   ruled out after further study. The medical record reflects the following:  Risk Factors: pneumonia  Clinical Indicators: SPO2 %, RR 14-20, weaned to RA quickly, shortness   of breath  Treatment: supplemental oxygen, Pulmonology consult, IV  Rocephin/Zithromax,   supportive care    Thank you,  Jacinda Parra RN, CDS  [phone]    Acute Respiratory Failure Clinical Indicators per [employer] [employer] Training Guide and   Quick Reference Guide:  pO2 < 60 mmHg or SpO2 (pulse oximetry) < 91% breathing room air  pCO2 > 50 and pH < 7.35  P/F ratio (pO2 / FIO2) < 300  pO2 decrease or pCO2 increase by 10 mmHg from baseline (if known)  Supplemental oxygen of 40% or more  Presence of respiratory distress, tachypnea, dyspnea, shortness of breath,   wheezing  Unable to speak in complete sentences  Use of accessory muscles to breathe  Extreme anxiety and feeling of impending doom  Tripod position  Confusion/altered mental status/obtunded  Options provided:  -- Acute Respiratory Failure ruled out after study, hypoxia only  -- Acute Respiratory Failure as evidenced by, Please document evidence.   -- Other - I will add my own diagnosis  -- Disagree - Not applicable / Not valid  -- Disagree - Clinically unable to determine / Unknown  -- Refer to Clinical Documentation Reviewer    PROVIDER RESPONSE TEXT:    Acute Respiratory Failure has been ruled out

## 2021-07-09 LAB
BLOOD CULTURE, ROUTINE: NORMAL
CULTURE, BLOOD 2: NORMAL

## 2021-09-13 ENCOUNTER — APPOINTMENT (OUTPATIENT)
Dept: GENERAL RADIOLOGY | Age: 46
End: 2021-09-13
Payer: COMMERCIAL

## 2021-09-13 ENCOUNTER — HOSPITAL ENCOUNTER (EMERGENCY)
Age: 46
Discharge: HOME | End: 2021-09-13
Attending: EMERGENCY MEDICINE
Payer: COMMERCIAL

## 2021-09-13 VITALS
OXYGEN SATURATION: 97 % | DIASTOLIC BLOOD PRESSURE: 98 MMHG | RESPIRATION RATE: 16 BRPM | TEMPERATURE: 98.3 F | HEIGHT: 65 IN | WEIGHT: 205 LBS | HEART RATE: 78 BPM | SYSTOLIC BLOOD PRESSURE: 148 MMHG | BODY MASS INDEX: 34.16 KG/M2

## 2021-09-13 DIAGNOSIS — I10 ESSENTIAL HYPERTENSION: ICD-10-CM

## 2021-09-13 DIAGNOSIS — R06.02 SHORTNESS OF BREATH: Primary | ICD-10-CM

## 2021-09-13 LAB
A/G RATIO: 1.5 (ref 1.1–2.2)
ALBUMIN SERPL-MCNC: 4 G/DL (ref 3.4–5)
ALP BLD-CCNC: 82 U/L (ref 40–129)
ALT SERPL-CCNC: 21 U/L (ref 10–40)
ANION GAP SERPL CALCULATED.3IONS-SCNC: 10 MMOL/L (ref 3–16)
AST SERPL-CCNC: 18 U/L (ref 15–37)
BASOPHILS ABSOLUTE: 0.1 K/UL (ref 0–0.2)
BASOPHILS RELATIVE PERCENT: 0.8 %
BILIRUB SERPL-MCNC: 0.3 MG/DL (ref 0–1)
BUN BLDV-MCNC: 12 MG/DL (ref 7–20)
CALCIUM SERPL-MCNC: 8.9 MG/DL (ref 8.3–10.6)
CHLORIDE BLD-SCNC: 106 MMOL/L (ref 99–110)
CO2: 25 MMOL/L (ref 21–32)
CREAT SERPL-MCNC: 0.7 MG/DL (ref 0.6–1.1)
EKG ATRIAL RATE: 80 BPM
EKG DIAGNOSIS: NORMAL
EKG P AXIS: 61 DEGREES
EKG P-R INTERVAL: 144 MS
EKG Q-T INTERVAL: 366 MS
EKG QRS DURATION: 126 MS
EKG QTC CALCULATION (BAZETT): 422 MS
EKG R AXIS: -5 DEGREES
EKG T AXIS: 19 DEGREES
EKG VENTRICULAR RATE: 80 BPM
EOSINOPHILS ABSOLUTE: 0.1 K/UL (ref 0–0.6)
EOSINOPHILS RELATIVE PERCENT: 1.5 %
GFR AFRICAN AMERICAN: >60
GFR NON-AFRICAN AMERICAN: >60
GLOBULIN: 2.6 G/DL
GLUCOSE BLD-MCNC: 181 MG/DL (ref 70–99)
HCT VFR BLD CALC: 39.8 % (ref 36–48)
HEMOGLOBIN: 13.8 G/DL (ref 12–16)
LYMPHOCYTES ABSOLUTE: 2.2 K/UL (ref 1–5.1)
LYMPHOCYTES RELATIVE PERCENT: 30.8 %
MAGNESIUM: 2.1 MG/DL (ref 1.8–2.4)
MCH RBC QN AUTO: 31.5 PG (ref 26–34)
MCHC RBC AUTO-ENTMCNC: 34.7 G/DL (ref 31–36)
MCV RBC AUTO: 90.6 FL (ref 80–100)
MONOCYTES ABSOLUTE: 0.4 K/UL (ref 0–1.3)
MONOCYTES RELATIVE PERCENT: 5.3 %
NEUTROPHILS ABSOLUTE: 4.3 K/UL (ref 1.7–7.7)
NEUTROPHILS RELATIVE PERCENT: 61.6 %
PDW BLD-RTO: 13 % (ref 12.4–15.4)
PLATELET # BLD: 271 K/UL (ref 135–450)
PMV BLD AUTO: 7.3 FL (ref 5–10.5)
POTASSIUM REFLEX MAGNESIUM: 3.5 MMOL/L (ref 3.5–5.1)
PRO-BNP: 26 PG/ML (ref 0–124)
RBC # BLD: 4.4 M/UL (ref 4–5.2)
SODIUM BLD-SCNC: 141 MMOL/L (ref 136–145)
TOTAL PROTEIN: 6.6 G/DL (ref 6.4–8.2)
TROPONIN: <0.01 NG/ML
WBC # BLD: 7 K/UL (ref 4–11)

## 2021-09-13 PROCEDURE — 83880 ASSAY OF NATRIURETIC PEPTIDE: CPT

## 2021-09-13 PROCEDURE — 83735 ASSAY OF MAGNESIUM: CPT

## 2021-09-13 PROCEDURE — 93005 ELECTROCARDIOGRAM TRACING: CPT | Performed by: EMERGENCY MEDICINE

## 2021-09-13 PROCEDURE — 80053 COMPREHEN METABOLIC PANEL: CPT

## 2021-09-13 PROCEDURE — 85025 COMPLETE CBC W/AUTO DIFF WBC: CPT

## 2021-09-13 PROCEDURE — 99283 EMERGENCY DEPT VISIT LOW MDM: CPT

## 2021-09-13 PROCEDURE — 36415 COLL VENOUS BLD VENIPUNCTURE: CPT

## 2021-09-13 PROCEDURE — 71045 X-RAY EXAM CHEST 1 VIEW: CPT

## 2021-09-13 PROCEDURE — 93010 ELECTROCARDIOGRAM REPORT: CPT | Performed by: INTERNAL MEDICINE

## 2021-09-13 PROCEDURE — 84484 ASSAY OF TROPONIN QUANT: CPT

## 2021-09-13 PROCEDURE — 86769 SARS-COV-2 COVID-19 ANTIBODY: CPT

## 2021-09-13 ASSESSMENT — ENCOUNTER SYMPTOMS
BACK PAIN: 0
CHEST TIGHTNESS: 1
SORE THROAT: 0
SHORTNESS OF BREATH: 1
VOMITING: 0
ABDOMINAL PAIN: 0
NAUSEA: 0
DIARRHEA: 0
EYE DISCHARGE: 0
COUGH: 0

## 2021-09-13 NOTE — ED PROVIDER NOTES
[facility] [facility]        Pt Name: Andrea Linn  MRN: [id number]  [place] 1975  Date of evaluation: 9/13/2021  Provider: Munira Ware MD  PCP: SHANTA Osullivan - CNP  ED Attending: Munira Ware MD    CHIEF COMPLAINT       Chief Complaint   Patient presents with    Pneumonia     pt states she was diagnosed with pneumonia in July and was admitted to [facility], states she has been in and out of PCPs office since then and placed on different antibiotics but continues to feel SOB       HISTORY OF PRESENT ILLNESS   (Location/Symptom, Timing/Onset, Context/Setting, Quality, Duration, Modifying Factors, Severity)  Note limiting factors. Andrea Linn is a 48 y.o. female who presents to the emergency department with persistent shortness of breath. Patient was diagnosed in July with pneumonia. She had multiple Covid swabs that were all negative. Patient has now been on 2 different rounds of antibiotics without any significant improvement of her shortness of breath. She states it seems to be worse with exertion and is associated with a moderate tightness in her chest or heaviness. Patient has significant fatigue with this as well. Given her symptoms and persistent symptoms she decided to come in for evaluation. History is obtained from the patient and , review of previous records. REVIEW OF SYSTEMS    (2-9 systems for level 4, 10 or more for level 5)     Review of Systems   Constitutional: Positive for fatigue. Negative for chills and fever. HENT: Negative for congestion and sore throat. Eyes: Negative for discharge. Respiratory: Positive for chest tightness and shortness of breath. Negative for cough. Cardiovascular: Negative for chest pain. Gastrointestinal: Negative for abdominal pain, diarrhea, nausea and vomiting. Endocrine: Negative for polydipsia. Genitourinary: Negative for dysuria and urgency.    Musculoskeletal: Negative for back pain and myalgias. Skin: Negative for rash. Neurological: Negative for weakness and headaches. Hematological: Does not bruise/bleed easily. Psychiatric/Behavioral: Negative for confusion. Positives and Pertinent negatives as per HPI. Except as noted above in the ROS, all other systems were reviewed and negative. PAST MEDICAL HISTORY     Past Medical History:   Diagnosis Date    Chest pain 11/14/2013    Hypertension     Pneumonia     Prolonged emergence from general anesthesia          SURGICAL HISTORY     Past Surgical History:   Procedure Laterality Date    ANTERIOR CRUCIATE LIGAMENT REPAIR Left     CARPAL TUNNEL RELEASE Left 07/2020    DILATION AND CURETTAGE OF UTERUS N/A 8/27/2020    VIDEO HYSTEROSCOPY DILATATION AND CURETTAGE, Josh Long performed by Mendel Bending, MD at [facility] HYSTEROSCOPY  1/23/15    polypectomy, D&C myosure    HYSTEROSCOPY  04/04/2018    KNEE ARTHROSCOPY Left     X 2    WRIST GANGLION EXCISION Left          CURRENTMEDICATIONS       Discharge Medication List as of 9/13/2021  5:06 PM      CONTINUE these medications which have NOT CHANGED    Details   lisinopril (PRINIVIL;ZESTRIL) 10 MG tablet Take 10 mg by mouth dailyHistorical Med               ALLERGIES     Patient has no known allergies.     FAMILYHISTORY       Family History   Problem Relation Age of Onset    High Blood Pressure Mother     Diabetes Father     Heart Attack Father     High Blood Pressure Father           SOCIAL HISTORY       Social History     Socioeconomic History    Marital status:      Spouse name: None    Number of children: None    Years of education: None    Highest education level: None   Occupational History    None   Tobacco Use    Smoking status: Never Smoker    Smokeless tobacco: Never Used   Vaping Use    Vaping Use: Never used   Substance and Sexual Activity    Alcohol use: No    Drug use: No    Sexual activity: None Other Topics Concern    None   Social History Narrative    None     Social Determinants of Health     Financial Resource Strain:     Difficulty of Paying Living Expenses:    Food Insecurity:     Worried About Running Out of Food in the Last Year:     [address] in the Last Year:    Transportation Needs:     Lack of Transportation (Medical):  Lack of Transportation (Non-Medical):    Physical Activity:     Days of Exercise per Week:     Minutes of Exercise per Session:    Stress:     Feeling of Stress :    Social Connections:     Frequency of Communication with Friends and Family:     Frequency of Social Gatherings with Friends and Family:     Attends Congregation Services:     Active Member of Clubs or Organizations:     Attends Club or Organization Meetings:     Marital Status:    Intimate Partner Violence:     Fear of Current or Ex-Partner:     Emotionally Abused:     Physically Abused:     Sexually Abused:        SCREENINGS             PHYSICAL EXAM    (up to 7 for level 4, 8 or more for level 5)     ED Triage Vitals [09/13/21 1527]   BP Temp Temp Source Pulse Resp SpO2 Height Weight   (!) 153/95 98.3 °F (36.8 °C) Oral 82 18 99 % 5' 5\" (1.651 m) 205 lb (93 kg)       Physical Exam  Vitals and nursing note reviewed. Constitutional:       General: She is not in acute distress. Appearance: Normal appearance. She is well-developed, well-groomed and overweight. She is not ill-appearing or toxic-appearing. HENT:      Head: Normocephalic and atraumatic. Right Ear: External ear normal.      Left Ear: External ear normal.      Nose:      Comments: Nose and oropharyngeal exam deferred secondary to Covid pandemic. Eyes:      General: No scleral icterus. Conjunctiva/sclera: Conjunctivae normal.   Cardiovascular:      Rate and Rhythm: Normal rate and regular rhythm. Heart sounds: Normal heart sounds. No murmur heard. No friction rub. No gallop.     Pulmonary:      Effort: Pulmonary effort is normal. No respiratory distress. Breath sounds: Normal breath sounds. No wheezing. Abdominal:      General: Bowel sounds are normal. There is no distension. Palpations: Abdomen is soft. Tenderness: There is no abdominal tenderness. There is no guarding or rebound. Musculoskeletal:         General: No tenderness. Normal range of motion. Cervical back: Normal range of motion and neck supple. Right lower leg: No edema. Left lower leg: No edema. Lymphadenopathy:      Cervical: No cervical adenopathy. Skin:     General: Skin is warm and dry. Capillary Refill: Capillary refill takes less than 2 seconds. Findings: No rash. Neurological:      Mental Status: She is alert and oriented to person, place, and time. Cranial Nerves: No cranial nerve deficit. Psychiatric:         Mood and Affect: Mood normal.         Behavior: Behavior is cooperative.          DIAGNOSTIC RESULTS   LABS:    Results for orders placed or performed during the hospital encounter of 09/13/21   CBC Auto Differential   Result Value Ref Range    WBC 7.0 4.0 - 11.0 K/uL    RBC 4.40 4.00 - 5.20 M/uL    Hemoglobin 13.8 12.0 - 16.0 g/dL    Hematocrit 39.8 36.0 - 48.0 %    MCV 90.6 80.0 - 100.0 fL    MCH 31.5 26.0 - 34.0 pg    MCHC 34.7 31.0 - 36.0 g/dL    RDW 13.0 12.4 - 15.4 %    Platelets 123 295 - 998 K/uL    MPV 7.3 5.0 - 10.5 fL    Neutrophils % 61.6 %    Lymphocytes % 30.8 %    Monocytes % 5.3 %    Eosinophils % 1.5 %    Basophils % 0.8 %    Neutrophils Absolute 4.3 1.7 - 7.7 K/uL    Lymphocytes Absolute 2.2 1.0 - 5.1 K/uL    Monocytes Absolute 0.4 0.0 - 1.3 K/uL    Eosinophils Absolute 0.1 0.0 - 0.6 K/uL    Basophils Absolute 0.1 0.0 - 0.2 K/uL   Comprehensive Metabolic Panel w/ Reflex to MG   Result Value Ref Range    Sodium 141 136 - 145 mmol/L    Potassium reflex Magnesium 3.5 3.5 - 5.1 mmol/L    Chloride 106 99 - 110 mmol/L    CO2 25 21 - 32 mmol/L    Anion Gap 10 3 - 16 Glucose 181 (H) 70 - 99 mg/dL    BUN 12 7 - 20 mg/dL    CREATININE 0.7 0.6 - 1.1 mg/dL    GFR Non-African American >60 >60    GFR African American >60 >60    Calcium 8.9 8.3 - 10.6 mg/dL    Total Protein 6.6 6.4 - 8.2 g/dL    Albumin 4.0 3.4 - 5.0 g/dL    Albumin/Globulin Ratio 1.5 1.1 - 2.2    Total Bilirubin 0.3 0.0 - 1.0 mg/dL    Alkaline Phosphatase 82 40 - 129 U/L    ALT 21 10 - 40 U/L    AST 18 15 - 37 U/L    Globulin 2.6 g/dL   Troponin   Result Value Ref Range    Troponin <0.01 <0.01 ng/mL   Brain Natriuretic Peptide   Result Value Ref Range    Pro-BNP 26 0 - 124 pg/mL   Covid-19, Antibody, Total   Result Value Ref Range    SARS-CoV-2 Antibody, Total Negative Negative   Magnesium   Result Value Ref Range    Magnesium 2.10 1.80 - 2.40 mg/dL   EKG 12 Lead   Result Value Ref Range    Ventricular Rate 80 BPM    Atrial Rate 80 BPM    P-R Interval 144 ms    QRS Duration 126 ms    Q-T Interval 366 ms    QTc Calculation (Bazett) 422 ms    P Axis 61 degrees    R Axis -5 degrees    T Axis 19 degrees    Diagnosis       Normal sinus rhythmRight bundle branch blockAbnormal ECGNo significant change was foundWhen compared with ECG of7.4. 21Confirmed by Selene Chong MD, [address] ([id number]) on 9/13/2021 8:31:41 PM       All other labs were within normal range ornot returned as of this dictation. EKG: All EKG's are interpreted by the Emergency Department Physician who either signs or Co-signs this chart in the absence of a cardiologist.  Please see their note for interpretation of EKG. EKG Interpretation    Interpreted by emergency department physician    Rhythm: normal sinus   Rate: normal  Axis: normal  Ectopy: none  Conduction: right bundle branch block (complete)  ST Segments: normal  T Waves: normal  Q Waves: none    Clinical Impression: normal sinus rhythm, right bundle branch block, no significant change to 7/2021.       RADIOLOGY:   Non-plain film images such as CT, Ultrasound and MRI are read by the radiologist.  Fani Child radiographic images are visualized and preliminarily interpreted by the ED Provider with the belowfindings:    Interpretation per the Radiologist below, if available at the time of this note:    XR CHEST PORTABLE   Final Result   Low lung volumes without evidence of acute cardiopulmonary process. PROCEDURES   Unless otherwise noted below, none     Procedures    CRITICAL CARE TIME   N/A    CONSULTS:  None      EMERGENCY DEPARTMENT COURSE and DIFFERENTIAL DIAGNOSIS/MDM:   Vitals:    Vitals:    09/13/21 1527 09/13/21 1640 09/13/21 1710   BP: (!) 153/95 (!) 147/104 (!) 148/98   Pulse: 82 81 78   Resp: 18 18 16   Temp: 98.3 °F (36.8 °C)     TempSrc: Oral     SpO2: 99% 96% 97%   Weight: 205 lb (93 kg)     Height: 5' 5\" (1.651 m)         Patient was given the following medications:  Medications - No data to display    I reviewed the patient's prior records including lab results. Patient had several COVID tests that were negative, however her symptoms were very much consistent with COVID. I am ordering COVID antibody testing. Despite the negative COVID tests, the patient may be suffering from post-COVID sequelae. Patient's cardiac workup at this time is negative. Her HEART score is low, supporting outpatient management. Her symptoms have been going on for some time, so I doubt unstable angina/ACS. I am referring the patient to pulmonology as she likely would benefit from specialist evaluation and testing. Patient encouraged to have close outpatient follow up. She and  are agreeable with this plan. Differential diagnosis included but was not limited to: acute coronary syndrome, pulmonary embolism, COPD/asthma, pneumonia, sepsis, pericardial tamponade, pneumothorax, CHF, thoracic aortic dissection, anxiety, COVID. The patient understands the importance of follow up and reasons to return.     HEART SCORE:    History: +0 for low suspicion  EKG: +0 for normal EKG   Age: +1 for age 44-72 years  Risk factors (includes HLD, HTN, DM, tobacco use, obesity, and +FHx): +1 for 1-2 risk factors  Initial troponin: +0 for negative troponin    Heart score: 2. This falls under the following category: Score of 0-3, which indicates a very low risk for major adverse cardiac event and supports early discharge      FINAL IMPRESSION      1. Shortness of breath    2. Essential hypertension          DISPOSITION/PLAN   DISPOSITION Decision To Discharge 09/13/2021 04:55:40 PM      PATIENT REFERRED TO:  Chacha Stewart MD  [address] [place] [address] Dr Francis Avila [facility]  [phone]    Schedule an appointment as soon as possible for a visit in 1 week      Huma Delong, APRB - [address] [facility] [address] [place] [address]  [phone]    Schedule an appointment as soon as possible for a visit in 1 week      [facility] Emergency Department  [address] [place] [address]  [phone]  Go to   If symptoms worsen      DISCHARGE MEDICATIONS:  Discharge Medication List as of 9/13/2021  5:06 PM          DISCONTINUED MEDICATIONS:  Discharge Medication List as of 9/13/2021  5:06 PM                 (Please note that portions of this note were completed with a voice recognition program.  Efforts were made to edit the dictations but occasionally words are mis-transcribed.)    Estrada Davis MD(electronically signed)              Estrada Davis MD  09/14/21 8141

## 2021-09-13 NOTE — ED NOTES
AVS provided and reviewed with the patient. The patient verbalized understanding of care at home, follow up care, and emergent symptoms to return for. No questions or concerns verbalized at this time. The patient is alert, oriented, stable, and ambulatory out of the department at the time of discharge.        Antonio Pagan RN  09/13/21 3590

## 2021-09-14 LAB — SARS-COV-2 ANTIBODY, TOTAL: NEGATIVE

## 2021-09-22 ENCOUNTER — TELEPHONE (OUTPATIENT)
Dept: PULMONOLOGY | Age: 46
End: 2021-09-22

## 2021-10-13 ENCOUNTER — HOSPITAL ENCOUNTER (OUTPATIENT)
Dept: PULMONOLOGY | Age: 46
Discharge: HOME | End: 2021-10-13
Payer: COMMERCIAL

## 2021-10-13 VITALS — OXYGEN SATURATION: 98 %

## 2021-10-13 DIAGNOSIS — R06.09 DOE (DYSPNEA ON EXERTION): ICD-10-CM

## 2021-10-13 LAB
EXPIRATORY TIME-POST: NORMAL
EXPIRATORY TIME: NORMAL
FEF 25-75% %CHNG: NORMAL
FEF 25-75% %PRED-POST: NORMAL
FEF 25-75% %PRED-PRE: NORMAL
FEF 25-75% PRED: NORMAL
FEF 25-75%-POST: NORMAL
FEF 25-75%-PRE: NORMAL
FEV1 %PRED-POST: 119 %
FEV1 %PRED-PRE: 116 %
FEV1 PRED: NORMAL
FEV1-POST: NORMAL
FEV1-PRE: NORMAL
FEV1/FVC %PRED-POST: NORMAL
FEV1/FVC %PRED-PRE: NORMAL
FEV1/FVC PRED: NORMAL
FEV1/FVC-POST: 87 %
FEV1/FVC-PRE: 87 %
FVC %PRED-POST: NORMAL
FVC %PRED-PRE: NORMAL
FVC PRED: NORMAL
FVC-POST: NORMAL
FVC-PRE: NORMAL
PEF %PRED-POST: NORMAL
PEF %PRED-PRE: NORMAL
PEF PRED: NORMAL
PEF%CHNG: NORMAL
PEF-POST: NORMAL
PEF-PRE: NORMAL

## 2021-10-13 PROCEDURE — 94060 EVALUATION OF WHEEZING: CPT

## 2021-10-13 PROCEDURE — 6370000000 HC RX 637 (ALT 250 FOR IP): Performed by: NURSE PRACTITIONER

## 2021-10-13 PROCEDURE — 94760 N-INVAS EAR/PLS OXIMETRY 1: CPT

## 2021-10-13 PROCEDURE — 94640 AIRWAY INHALATION TREATMENT: CPT

## 2021-10-13 RX ORDER — ALBUTEROL SULFATE 90 UG/1
4 AEROSOL, METERED RESPIRATORY (INHALATION) ONCE
Status: COMPLETED | OUTPATIENT
Start: 2021-10-13 | End: 2021-10-13

## 2021-10-13 RX ADMIN — Medication 4 PUFF: at 14:07

## 2021-10-13 ASSESSMENT — PULMONARY FUNCTION TESTS
FEV1/FVC_POST: 87
FEV1_PERCENT_PREDICTED_PRE: 116
FEV1/FVC_PRE: 87
FEV1_PERCENT_PREDICTED_POST: 119

## 2021-10-14 ENCOUNTER — HOSPITAL ENCOUNTER (EMERGENCY)
Age: 46
Discharge: HOME | End: 2021-10-14
Attending: EMERGENCY MEDICINE
Payer: COMMERCIAL

## 2021-10-14 ENCOUNTER — APPOINTMENT (OUTPATIENT)
Dept: GENERAL RADIOLOGY | Age: 46
End: 2021-10-14
Payer: COMMERCIAL

## 2021-10-14 VITALS
BODY MASS INDEX: 34.16 KG/M2 | DIASTOLIC BLOOD PRESSURE: 74 MMHG | RESPIRATION RATE: 18 BRPM | SYSTOLIC BLOOD PRESSURE: 138 MMHG | WEIGHT: 205 LBS | TEMPERATURE: 98.3 F | OXYGEN SATURATION: 96 % | HEIGHT: 65 IN | HEART RATE: 87 BPM

## 2021-10-14 DIAGNOSIS — R93.6 ABNORMAL X-RAY OF HAND: ICD-10-CM

## 2021-10-14 DIAGNOSIS — R03.0 ELEVATED BLOOD PRESSURE READING: ICD-10-CM

## 2021-10-14 DIAGNOSIS — S63.501A SPRAIN OF RIGHT WRIST, INITIAL ENCOUNTER: ICD-10-CM
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
## 2021-10-14 PROCEDURE — 99282 EMERGENCY DEPT VISIT SF MDM: CPT

## 2021-10-14 PROCEDURE — 73110 X-RAY EXAM OF WRIST: CPT

## 2021-10-14 ASSESSMENT — PAIN DESCRIPTION - INTENSITY
RATING_2: 0
RATING_2: 5

## 2021-10-14 ASSESSMENT — PAIN DESCRIPTION - PAIN TYPE: TYPE: ACUTE PAIN

## 2021-10-14 ASSESSMENT — PAIN DESCRIPTION - FREQUENCY: FREQUENCY: CONTINUOUS

## 2021-10-14 ASSESSMENT — PAIN DESCRIPTION - LOCATION
LOCATION: WRIST
LOCATION_2: LEG

## 2021-10-14 ASSESSMENT — PAIN DESCRIPTION - DESCRIPTORS
DESCRIPTORS_2: THROBBING;ACHING
DESCRIPTORS: ACHING;THROBBING

## 2021-10-14 ASSESSMENT — PAIN DESCRIPTION - ORIENTATION
ORIENTATION: RIGHT
ORIENTATION_2: RIGHT;LOWER

## 2021-10-14 ASSESSMENT — PAIN DESCRIPTION - DURATION: DURATION_2: CONTINUOUS

## 2021-10-14 ASSESSMENT — PAIN DESCRIPTION - DIRECTION: RADIATING_TOWARDS: FOREARM

## 2021-10-14 ASSESSMENT — PAIN SCALES - GENERAL
PAINLEVEL_OUTOF10: 0
PAINLEVEL_OUTOF10: 5

## 2021-10-14 NOTE — ED TRIAGE NOTES
Presents with c/o pain and swelling to R wrist pain to R lower leg after falling down the steps. Area to wrist is noted to have a raised area approx the size of a quarter. No ecchymosis noted to area.  Cap refill less than 2sec,

## 2021-10-15 NOTE — ED PROVIDER NOTES
[address] [facility]  Fall (C/o pain to RUE and RLE)       HISTORY OF PRESENT ILLNESS  Angela Luque is a 48 y.o. female  who presents to the ED complaining of right wrist pain after a fall. The patient states that she fell down a flight of steps, this was caused by her missing a step. She states that she did hit her head, but denies LOC she is notable thinners. She denies current headache or visual changes. She denies any dizziness. She states she has left eye redness from a work injury several days ago but this is unrelated to her accident today. She states that she fell on her outstretched right arm, describes wrist pain. She describes some tingling in the ulnar aspect of her right wrist but denies any actual numbness. She is able to move her fingers but it causes pain. She denies elbow or shoulder pain. Denies any neck or back pain. She denies any chest or abdominal pain. She is left-hand dominant. Denies concern for pregnancy. No other complaints, modifying factors or associated symptoms. I have reviewed the following from the nursing documentation.     Past Medical History:   Diagnosis Date    Chest pain 11/14/2013    Hypertension     Pneumonia     Prolonged emergence from general anesthesia      Past Surgical History:   Procedure Laterality Date    ANTERIOR CRUCIATE LIGAMENT REPAIR Left     CARPAL TUNNEL RELEASE Left 07/2020    DILATION AND CURETTAGE OF UTERUS N/A 8/27/2020    VIDEO HYSTEROSCOPY DILATATION AND CURETTAGERanjan, MYOSURE performed by Lorri Flores MD at [address] [place] [address] HYSTEROSCOPY  1/23/15    polypectomy, D&C myosure    HYSTEROSCOPY  04/04/2018    KNEE ARTHROSCOPY Left     X 2    WRIST GANGLION EXCISION Left      Family History   Problem Relation Age of Onset    High Blood Pressure Mother     Diabetes Father     Heart Attack Father     High Blood Pressure Father      Social History     Socioeconomic History    Marital status:      Spouse name: Not on file    Number of children: Not on file    Years of education: Not on file    Highest education level: Not on file   Occupational History    Not on file   Tobacco Use    Smoking status: Never Smoker    Smokeless tobacco: Never Used   Vaping Use    Vaping Use: Never used   Substance and Sexual Activity    Alcohol use: No    Drug use: No    Sexual activity: Not on file   Other Topics Concern    Not on file   Social History Narrative    Not on file     Social Determinants of Health     Financial Resource Strain:     Difficulty of Paying Living Expenses:    Food Insecurity:     Worried About Running Out of Food in the Last Year:     [address] in the Last Year:    Transportation Needs:     Lack of Transportation (Medical):  Lack of Transportation (Non-Medical):    Physical Activity:     Days of Exercise per Week:     Minutes of Exercise per Session:    Stress:     Feeling of Stress :    Social Connections:     Frequency of Communication with Friends and Family:     Frequency of Social Gatherings with Friends and Family:     Attends [employer] Services:     Active Member of Clubs or Organizations:     Attends Club or Organization Meetings:     Marital Status:    Intimate Partner Violence:     Fear of Current or Ex-Partner:     Emotionally Abused:     Physically Abused:     Sexually Abused:      No current facility-administered medications for this encounter. Current Outpatient Medications   Medication Sig Dispense Refill    lisinopril (PRINIVIL;ZESTRIL) 10 MG tablet Take 10 mg by mouth daily       No Known Allergies    REVIEW OF SYSTEMS  10 systems reviewed, pertinent positives per HPI otherwise noted to be negative. PHYSICAL EXAM  /74   Pulse 87   Temp 98.3 °F (36.8 °C)   Resp 18   Ht 5' 5\" (1.651 m)   Wt 205 lb (93 kg)   SpO2 96%   BMI 34.11 kg/m²    Physical exam:  General appearance: awake and cooperative.   No distress. Non toxic appearing. Skin: Warm and dry. No rashes or lesions. HENT: Normocephalic. Atraumatic. Mucus membranes are moist.   Neck: supple. No C-spine tenderness midline  Eyes: TUCKER. EOM intact. Left eye conjunctival injection no proptosis or chemosis  Heart: RRR. No murmurs. Lungs: Respirations unlabored. CTAB. No wheezes, rales, or rhonchi. Good air exchange  Abdomen: No tenderness. Soft. Non distended. No peritoneal signs. Musculoskeletal: Right upper extremity: No shoulder, elbow, or proximal forearm tenderness ; there is mild soft tissue swelling to the dorsal ulnar aspect with no deformity; no anatomical snuffbox tenderness, or pain with axial compression; no soft tissue swelling or tenderness to the palmar or dorsal aspect of the hand; median, radial, and ulnar nerves intact motor and sensory. cardinal motions of hand intact. MCP, PIP, and DIP intact with flexion and extension 5/5 strength. Cap refill <2 seconds. Radial and ulnar pulses +2/4 biaterally. no midline thoracic or lumbar tenderness. No extremity edema. Compartments soft. No deformity. No tenderness in the extremities. All extremities neurovascularly intact. Radial, Dp, and PT pulses +2/4 bilaterally  Neurological: Alert and oriented. No focal deficits. No aphasia or dysarthria. No gait ataxia. Psychiatric: Normal mood and affect. LABS  I have reviewed all labs for this visit. No results found for this visit on 10/14/21. ECG      RADIOLOGY  XR WRIST RIGHT (MIN 3 VIEWS)    Result Date: 10/14/2021  EXAMINATION: 3 X-RAY VIEWS OF THE RIGHT WRIST 10/14/2021 4:38 pm COMPARISON: None.  HISTORY: ORDERING SYSTEM PROVIDED HISTORY: fall TECHNOLOGIST PROVIDED HISTORY: Reason for exam:->fall Reason for Exam: fall Acuity: Acute Type of Exam: Initial FINDINGS: There is induration of subcutaneous soft tissues about the distal forearm/wrist.  There are moderate degenerative/arthropathic changes in the region of the 1st carpometacarpal joint. There is mild degenerative change along the lateral aspect of the carpus. There is minimal widening of interspace between navicular and lunate bones. Finding may be projectional/developmental versus changes of injury involving the scapholunate ligament, age indeterminate. No definite acute fracture or dislocation is identified. 1. Degenerative changes of the wrist with no evidence of acute fracture. Follow-up examination recommended in 7-10 days if clinically indicated. 2. Minimal widening of interspace between navicular and lunate bones. Finding could be developmental/projectional versus changes associated with injury involving scapholunate ligament, age indeterminate. ED COURSE/MDM  Patient seen and evaluated. Old records reviewed. Labs and imaging reviewed and results discussed with patient. This is a 67-year-old female presenting for evaluation after a fall. Her vital signs are within normal limits she is neurovascularly intact. She primarily is describing wrist pain on the ulnar aspect of her right wrist.  There is no deformity on exam.  She is neurovascularly intact. She did hit her head, but denies any headache I will hold off on CT at this time since she is not on blood thinners and is negative on [place] head CT criteria and is neurologically intact. There is no midline tenderness to the neck or back. Her plain film of her right wrist is negative for fracture, however there was questionable age-indeterminate ligamentous injury of her scapholunate ligament. However the patient does not have any tenderness or soft tissue swelling of her hand on exam, there is no associated pain I do not feel that this finding is related to her recent trauma. Regardless, the patient is placed in a Velcro splint for comfort. She is referred to hand for her abnormal x-ray as well as follow-up of her wrist sprain. We discussed symptomatic care instructions for home.   Again she is given Ortho follow-up and is given strict return precautions back to the ED. She voices understanding. During the patient's ED course, the patient was given:  Medications - No data to display     CLINICAL IMPRESSION  1. Fall down steps, initial encounter    2. Sprain of right wrist, initial encounter    3. Abnormal x-ray of hand    4. Elevated blood pressure reading        Blood pressure 138/74, pulse 87, temperature 98.3 °F (36.8 °C), resp. rate 18, height 5' 5\" (1.651 m), weight 205 lb (93 kg), SpO2 96 %, not currently breastfeeding. Patient was given scripts for the following medications. I counseled patient how to take these medications. Discharge Medication List as of 10/14/2021  7:53 PM          Follow-up with:  Nella Rodriguez MD  [address] [place] [address]  [phone]    Call in 1 day      Corinna Luo, APRN - [facility] [facility]  [phone]    Call in 1 day        DISCLAIMER: This chart was created using Dragon dictation software. Efforts were made by me to ensure accuracy, however some errors may be present due to limitations of this technology and occasionally words are not transcribed correctly.        Isak Harrison  10/14/21 9680

## 2021-10-26 ENCOUNTER — OFFICE VISIT (OUTPATIENT)
Dept: CARDIOLOGY CLINIC | Age: 46
End: 2021-10-26
Payer: COMMERCIAL

## 2021-10-26 VITALS
HEART RATE: 107 BPM | BODY MASS INDEX: 32.15 KG/M2 | WEIGHT: 193 LBS | OXYGEN SATURATION: 97 % | SYSTOLIC BLOOD PRESSURE: 118 MMHG | DIASTOLIC BLOOD PRESSURE: 86 MMHG | HEIGHT: 65 IN

## 2021-10-26 DIAGNOSIS — R06.09 DYSPNEA ON EXERTION: ICD-10-CM

## 2021-10-26 DIAGNOSIS — R53.83 FATIGUE, UNSPECIFIED TYPE: ICD-10-CM

## 2021-10-26 DIAGNOSIS — R07.9 CHEST PAIN, UNSPECIFIED TYPE: Primary | ICD-10-CM

## 2021-10-26 DIAGNOSIS — I10 PRIMARY HYPERTENSION: ICD-10-CM

## 2021-10-26 PROCEDURE — 99204 OFFICE O/P NEW MOD 45 MIN: CPT | Performed by: INTERNAL MEDICINE

## 2021-10-26 NOTE — LETTER
[address]  [address] [address]  Phone: [phone]  Fax: [phone]    Chari Sifuentes MD    October 26, 2021     Seb Pina, APRN - CNP  [facility] [address] [place] [address]    Patient: Angela Luque   MR Number: <[id number]>   YOB: 1975   Date of Visit: 10/26/2021       Dear Seb Pina: Thank you for referring Chanda Inman to me for evaluation/treatment. Below are the relevant portions of my assessment and plan of care. If you have questions, please do not hesitate to call me. I look forward to following Sigifredo Godfrey along with you.     Sincerely,      Chari Sifuentes MD

## 2021-10-26 NOTE — PROGRESS NOTES
CARDIOLOGY CONSULTATION        Patient Name: Dov Gonzales  Primary Care physician: SHANTA Lynch CNP    Reason for Referral/Chief Complaint: Dov Gonzales is a 48 y.o. patient who is referred to cardiology clinic today for evaluation and treatment of chest pressure, shortness of breath and fatigue. History of Present Illness:   Dov Gonzales 1975 has a prior medical history notable for hypertension treated with lisinopril, two bouts of pneumonia in July, and obesity, who presents today for evaluation. The patient presented to ED 9/13/2021 for shortness of breath and chest discomfort. proBNP was 26. Negative cardiac enzymes. Chest x-ray unremarkable. EKG as below. Patient was discharged. Today she states, she has had chest pressure with activity that comes and goes since her pneumonia. She feels fatigued throughout the day. Her chest pain may come on and last for 4 days at a time. She feels an intense substernal pressure. She does note worsening chest pressure with activity such as climbing the stairs. She has dyspnea with exertion such as climbing stairs and needs to sit USP up. She can do activities of daily living believers through them. She does have dizziness at times described as vertigo. She has some palpitations. No syncope. Sometimes her dizziness/palpitations may happen when she gets up too quickly. The patient has no prior history of heart disease, diabetes mellitus, hyperlipidemia or heart failure. No history of thromboembolism. CT PE protocol done in July was negative. Denies paroxysmal nocturnal dyspnea, orthopnea, bendopnea, increasing lower extremity edema or weight gain. The patient endorses highest level of activity as working in [employer] at [employer] in [place]. Past Medical History:   has a past medical history of Chest pain, Hypertension, Pneumonia, and Prolonged emergence from general anesthesia.     Surgical History:   has a past surgical history that includes Anterior cruciate ligament repair (Left); Wrist ganglion excision (Left); hysteroscopy (1/23/15); Knee arthroscopy (Left); hysteroscopy (04/04/2018); Carpal tunnel release (Left, 07/2020); and Dilation and curettage of uterus (N/A, 8/27/2020). Social History:   reports that she has never smoked. She has never used smokeless tobacco. She reports that she does not drink alcohol and does not use drugs. She works in [employer] at [employer] in [place]. She has 3 children, all in their 25s. Family History:  family history includes Diabetes in her father; Heart Attack in her father; High Blood Pressure in her father and mother. 6/10 father's siblings had cardiac disease. Father had heart surgery in his 52's. She has brother with no known heart disease. Children with no known heart disease. Home Medications:  Were reviewed and are listed in nursing record and/or below  Prior to Admission medications    Medication Sig Start Date End Date Taking? Authorizing Provider   lisinopril (PRINIVIL;ZESTRIL) 10 MG tablet Take 10 mg by mouth daily    Historical Provider, MD        CURRENT Medications:  No current facility-administered medications for this visit. Allergies:  Patient has no known allergies. Review of Systems:   A 14 point review of symptoms completed. Pertinent positives identified in the HPI, all other review of symptoms negative as below. Objective:     Vitals:    10/26/21 1444   BP: 118/86   Pulse: 107   SpO2: 97%    Weight: 193 lb (87.5 kg)       PHYSICAL EXAM:    General:  Alert, cooperative, no distress, appears stated age, fatigued appearing   Head:  Normocephalic, atraumatic   Eyes:  Conjunctiva/corneas clear, injected sclera bilaterally. Nose: Nares normal, no drainage or sinus tenderness   Throat: No abnormalities of the lips, oral mucosa or tongue. Neck: Trachea midline.  Neck supple with no lymphadenopathy, thyroid not enlarged, symmetric, no tenderness/mass/nodules, no Jugular venous pressure elevation    Lungs:   Clear to auscultation bilaterally, no wheezes, no rales, no respiratory distress   Chest Wall:  No deformity or tenderness to palpation   Heart:  Regular rate and rhythm, normal S1, normal S2, no murmur, no rub, no S3/S4, PMI non-palpable. Abdomen:    Obese abdomen, soft, non-tender, with normoactive bowel sounds. No masses, no hepatosplenomegaly   Extremities: No cyanosis, clubbing or pitting edema. Vascular: 2+ radial, 2+ dorsalis pedis and posterior tibial pulses bilaterally. Brisk carotid upstrokes without carotid bruit. Skin: Skin color, texture, turgor are normal with no rashes or ulceration. Bluish hue to hands/digits   Pysch:  Low mood, appropriate affect   Neurologic: Oriented to person, place and time. No slurred speech or facial asymmetry. No motor or sensory deficits on gross examination.          Labs:   CBC:   Lab Results   Component Value Date    WBC 7.0 09/13/2021    RBC 4.40 09/13/2021    HGB 13.8 09/13/2021    HCT 39.8 09/13/2021    MCV 90.6 09/13/2021    RDW 13.0 09/13/2021     09/13/2021     CMP:  Lab Results   Component Value Date     09/13/2021    K 3.5 09/13/2021     09/13/2021    CO2 25 09/13/2021    BUN 12 09/13/2021    CREATININE 0.7 09/13/2021    GFRAA >60 09/13/2021    AGRATIO 1.5 09/13/2021    LABGLOM >60 09/13/2021    GLUCOSE 181 09/13/2021    PROT 6.6 09/13/2021    CALCIUM 8.9 09/13/2021    BILITOT 0.3 09/13/2021    ALKPHOS 82 09/13/2021    AST 18 09/13/2021    ALT 21 09/13/2021     PT/INR:  No results found for: PTINR  HgBA1c:No results found for: LABA1C  Lab Results   Component Value Date    TROPONINI <0.01 09/13/2021     Lab Results   Component Value Date    CHOL 141 11/15/2013     Lab Results   Component Value Date    TRIG 89 11/15/2013     Lab Results   Component Value Date    HDL 32 (L) 11/15/2013     Lab Results   Component Value Date    LDLCALC 91 11/15/2013     Lab Results Component Value Date    LABVLDL 18 11/15/2013     No results found for: ANTWAN      Cardiac Data:     EK21 personally reviewed with my interpretation:  Normal sinus rhythm Right bundle branch block Abnormal ECG No significant change was found        MYOVIEW 11/15/2013  Impression: 1. Apparent reversible perfusion defect involving the left ventricular septum near the base of the heart is felt to likely represent artifact due to misalignment of the short axis resting and stress series. A focus of stress induced myocardial ischemia cannot be excluded and correlation with ECG is advised. 2. LVEF 64%     GXT Stress Test: 11/15/2013  Summary    normal sinus rhythm at rest. Incomplete RBBB. Exercised on treadmill according to standard Trev protocol for5.07 minutes    achieving peak heart rate of 171 which is 95% of predicted maximum heart    rate. c/o SOB and right mid back pain rates at 2-3/10. no c/o chest pain. sob    resolved but pain remains 2-3/10 in right mid back. No EKG changes of stress induced left ventricular ischemia. Myoview was    injected results to follow from radiology. Additional studies:   CXR 2021  FINDINGS: The cardiomediastinal silhouette is normal in size. Lung volumes are low. The lungs are clear. No pleural effusion or pneumothorax is present. CT Chest/Pulmonary 2021  FINDINGS: Pulmonary Arteries: Pulmonary arteries are adequately opacified for evaluation. No evidence of intraluminal filling defect to suggest pulmonary embolism. Main pulmonary artery is normal in caliber. Mediastinum: No evidence of mediastinal lymphadenopathy. The heart and pericardium demonstrate no acute abnormality. There is no acute abnormality of the thoracic aorta. Lungs/pleura: Scattered ground-glass opacities identified throughout much of the left lower lobe, compatible with pneumonia. Remainder lung fields clear. No focal consolidation or pulmonary edema.   No evidence of pleural effusion or pneumothorax. Tracheobronchial tree patent. Upper Abdomen: Limited images of the upper abdomen again show multiple gallstones without CT evidence cholecystitis. No dilatation biliary tree. Soft Tissues/Bones: No acute bone or soft tissue abnormality. Heterogeneous thyroid with probable several small nodules on the right, largest likely 9-10 mm. Mild DJD spine. Impression and Plan:      1. Atypical angina  2. Shortness of breath with exertion  3. Prior history of pneumonia  4. Right bundle branch block   5. Hypertension, well controlled  6. Thyroid nodules by CT  7. Obesity  8. Dizziness/vertigo    Patient Active Problem List   Diagnosis    Chest pain    Abnormal uterine bleeding (AUB)    Iron deficiency anemia due to chronic blood loss    Fibroid uterus    Pneumonia    Hypertension    Dyspnea on exertion    Fatigue       PLAN:  1. Will obtain transthoracic echocardiogram for evaluation of underlying cardiac structure and function. 2.  Proceed with Myoview GXT  2. Labs: TSH and fasting lipids    Follow-up pending results. Will call when results return. This note was scribed in the presence of Gena Jarrell MD by Kun Soares RN. The scribes documentation has been prepared under my direction and personally reviewed by me in its entirety. I confirm that the note above accurately reflects all work, treatment, procedures, and medical decision making performed by me. Kevin Aguilar MD, personally performed the services described in this documentation as scribed by Kun Soares RN. in my presence, and it is both accurate and complete to the best of our ability. I will address the patient's cardiac risk factors and adjusted pharmacologic treatment as needed. In addition, I have reinforced the need for patient directed risk factor modification. All questions and concerns were addressed to the patient/family.  Alternatives to my treatment were discussed. Thank you for allowing us to participate in the care of [facility]. Please call me with any questions [phone].     Deysi Long MD, [address] [facility]  Cardiovascular Disease  [facility] [address]  [phone] [facility]  [phone] [address] [place] [address]  10/26/2021 3:12 PM

## 2021-10-26 NOTE — PATIENT INSTRUCTIONS
PLAN:  1. Call [phone] to schedule echocardiogram and stress test  2.  Labs: TSH and fasting lipids    Plan to follow up pending results

## 2021-11-10 ENCOUNTER — HOSPITAL ENCOUNTER (OUTPATIENT)
Age: 46
Discharge: HOME | End: 2021-11-10
Payer: COMMERCIAL

## 2021-11-10 ENCOUNTER — HOSPITAL ENCOUNTER (OUTPATIENT)
Dept: NUCLEAR MEDICINE | Age: 46
Discharge: HOME | End: 2021-11-10
Payer: COMMERCIAL

## 2021-11-10 ENCOUNTER — HOSPITAL ENCOUNTER (OUTPATIENT)
Dept: NON INVASIVE DIAGNOSTICS | Age: 46
Discharge: HOME | End: 2021-11-10
Payer: COMMERCIAL

## 2021-11-10 DIAGNOSIS — R07.9 CHEST PAIN, UNSPECIFIED TYPE: ICD-10-CM

## 2021-11-10 DIAGNOSIS — I10 PRIMARY HYPERTENSION: ICD-10-CM

## 2021-11-10 DIAGNOSIS — R53.83 FATIGUE, UNSPECIFIED TYPE: ICD-10-CM

## 2021-11-10 DIAGNOSIS — R06.09 DYSPNEA ON EXERTION: ICD-10-CM

## 2021-11-10 LAB
CHOLESTEROL, FASTING: 180 MG/DL (ref 0–199)
HDLC SERPL-MCNC: 41 MG/DL (ref 40–60)
LDL CHOLESTEROL CALCULATED: 122 MG/DL
LV EF: 53 %
LV EF: 64 %
LVEF MODALITY: NORMAL
LVEF MODALITY: NORMAL
TRIGLYCERIDE, FASTING: 87 MG/DL (ref 0–150)
TSH REFLEX FT4: 0.74 UIU/ML (ref 0.27–4.2)
VLDLC SERPL CALC-MCNC: 17 MG/DL

## 2021-11-10 PROCEDURE — A9502 TC99M TETROFOSMIN: HCPCS | Performed by: INTERNAL MEDICINE

## 2021-11-10 PROCEDURE — 93306 TTE W/DOPPLER COMPLETE: CPT

## 2021-11-10 PROCEDURE — 80061 LIPID PANEL: CPT

## 2021-11-10 PROCEDURE — 36415 COLL VENOUS BLD VENIPUNCTURE: CPT

## 2021-11-10 PROCEDURE — 78452 HT MUSCLE IMAGE SPECT MULT: CPT

## 2021-11-10 PROCEDURE — 3430000000 HC RX DIAGNOSTIC RADIOPHARMACEUTICAL: Performed by: INTERNAL MEDICINE

## 2021-11-10 PROCEDURE — 84443 ASSAY THYROID STIM HORMONE: CPT

## 2021-11-10 PROCEDURE — 93017 CV STRESS TEST TRACING ONLY: CPT

## 2021-11-10 RX ADMIN — TETROFOSMIN 10.7 MILLICURIE: 1.38 INJECTION, POWDER, LYOPHILIZED, FOR SOLUTION INTRAVENOUS at 08:35

## 2021-11-10 RX ADMIN — TETROFOSMIN 32 MILLICURIE: 1.38 INJECTION, POWDER, LYOPHILIZED, FOR SOLUTION INTRAVENOUS at 10:15

## 2021-11-10 NOTE — PROGRESS NOTES
Pt educated on cardiac stress testing. Pt verbalizes understanding to cardiac stress testing. Questions and concerns addressed. Pt is agreeable to proceed with stress testing. Pt refuses serum pregnancy test. States she had an ablation and does not have a period.

## 2021-11-15 ENCOUNTER — TELEPHONE (OUTPATIENT)
Dept: CARDIOLOGY CLINIC | Age: 46
End: 2021-11-15

## 2021-11-15 NOTE — TELEPHONE ENCOUNTER
----- Message from Marily Kauffman MD sent at 11/14/2021  6:47 PM EST -----  Labs show normal thyroid studies, only mildly elevated LDL or 'bad cholesterol. \"  Other lipids are within goal.  We will follow-up results of pending cardiac studies.   No changes at this time

## 2021-11-15 NOTE — TELEPHONE ENCOUNTER
----- Message from Ricky Hammonds MD sent at 11/14/2021  6:48 PM EST -----  Normal stress test with no evidence of underlying blockage. No need for follow-up. See additional messages for results for labs and echocardiogram on this patient.

## 2021-12-28 ENCOUNTER — OFFICE VISIT (OUTPATIENT)
Dept: PULMONOLOGY | Age: 46
End: 2021-12-28
Payer: COMMERCIAL

## 2021-12-28 VITALS
SYSTOLIC BLOOD PRESSURE: 148 MMHG | TEMPERATURE: 98.2 F | WEIGHT: 202 LBS | RESPIRATION RATE: 16 BRPM | OXYGEN SATURATION: 98 % | HEART RATE: 74 BPM | BODY MASS INDEX: 33.66 KG/M2 | HEIGHT: 65 IN | DIASTOLIC BLOOD PRESSURE: 86 MMHG

## 2021-12-28 DIAGNOSIS — I10 UNCONTROLLED HYPERTENSION: ICD-10-CM

## 2021-12-28 DIAGNOSIS — Z78.9 NONSMOKER: ICD-10-CM

## 2021-12-28 DIAGNOSIS — R93.89 ABNORMAL CT OF THE CHEST: ICD-10-CM

## 2021-12-28 DIAGNOSIS — E66.01 MORBID OBESITY (HCC): ICD-10-CM

## 2021-12-28 DIAGNOSIS — R06.09 DYSPNEA ON EXERTION: Primary | ICD-10-CM

## 2021-12-28 PROCEDURE — 99214 OFFICE O/P EST MOD 30 MIN: CPT | Performed by: INTERNAL MEDICINE

## 2021-12-28 RX ORDER — BUDESONIDE AND FORMOTEROL FUMARATE DIHYDRATE 160; 4.5 UG/1; UG/1
2 AEROSOL RESPIRATORY (INHALATION) 2 TIMES DAILY
Qty: 10.2 G | Refills: 1 | Status: SHIPPED | OUTPATIENT
Start: 2021-12-28 | End: 2022-10-18

## 2021-12-28 RX ORDER — ALBUTEROL SULFATE 90 UG/1
2 AEROSOL, METERED RESPIRATORY (INHALATION)
COMMUNITY
Start: 2021-07-27 | End: 2022-10-18

## 2021-12-28 NOTE — PROGRESS NOTES
[facility]                                                                    CHIEF COMPLAINT: Shortness of breath        HPI:   Patient was admitted July 2021 treated for left lower lobe community-acquired pneumonia. Has been SOB since July, on and off. Still active and can walk as far as she wants. She get intermittent tightness in the chest and being seen by cardiology. Little cough with no wheezes. No hemoptysis. Life long nonsmoker  No personal history of asthma or seasonal allergy   Uses Albuterol 1-3 times/week with some help she believes. Past Medical History:   Diagnosis Date    Chest pain 11/14/2013    Hypertension     Pneumonia     Prolonged emergence from general anesthesia        Past Surgical History:        Procedure Laterality Date    ANTERIOR CRUCIATE LIGAMENT REPAIR Left     CARPAL TUNNEL RELEASE Left 07/2020    DILATION AND CURETTAGE OF UTERUS N/A 8/27/2020    VIDEO HYSTEROSCOPY DILATATION AND CURETTAGE, NOVASURE ABLATION, MYOSURE performed by Cherelle Lomas MD at [address] [place] [address] HYSTEROSCOPY  1/23/15    polypectomy, D&C myosure    HYSTEROSCOPY  04/04/2018    KNEE ARTHROSCOPY Left     X 2    WRIST GANGLION EXCISION Left        Allergies:  is allergic to tioconazole. Social History:    TOBACCO:   reports that she has never smoked. She has never used smokeless tobacco.  ETOH:   reports no history of alcohol use.       Family History:       Problem Relation Age of Onset    High Blood Pressure Mother     Diabetes Father     Heart Attack Father     High Blood Pressure Father        Current Medications:    Current Outpatient Medications:     albuterol sulfate HFA (PROAIR HFA) 108 (90 Base) MCG/ACT inhaler, Inhale 2 puffs into the lungs, Disp: , Rfl:     budesonide-formoterol (SYMBICORT) 160-4.5 MCG/ACT AERO, Inhale 2 puffs into the lungs 2 times daily, Disp: 10.2 g, Rfl: 1    lisinopril (PRINIVIL;ZESTRIL) 10 MG tablet, Take 10 mg by mouth daily, Disp: , Rfl:           Objective:   PHYSICAL EXAM:    Blood pressure (!) 148/86, pulse 74, temperature 98.2 °F (36.8 °C), temperature source Temporal, resp. rate 16, height 5' 5\" (1.651 m), weight 202 lb (91.6 kg), SpO2 98 %, not currently breastfeeding.' on RA  Gen: No distress. Eyes: PERRL. No sclera icterus. No conjunctival injection. ENT: No discharge. Pharynx clear. Neck: Trachea midline. No obvious mass. Resp: No accessory muscle use. No crackles. No wheezes. No rhonchi. No dullness on percussion. Good air entry. CV: Regular rate. Regular rhythm. No murmur or rub. No edema. GI: Non-tender. Non-distended. No hernia. Skin: Warm and dry. No nodule on exposed extremities. Lymph: No cervical LAD. No supraclavicular LAD. M/S: No cyanosis. No joint deformity. No clubbing. Neuro: Awake. Alert. Moves all four extremities. Psych: Oriented x 3. No anxiety. DATA reviewed by me:   CTPA 7/4/21 imaging was reviewed by me and showed   No evidence of pulmonary embolism. Ground-glass opacities left lower lobe suspicious for pneumonia. Chest x-ray 9/13/2021 imaging reviewed by me and showed  Low lung volume with no acute findings      Assessment:       · Dyspnea- obesity and anxiety are contributing. Doubt underlying pulmonary disease  · Abnormal CT chest 7/4/2021 admitted and treated for pneumonia. Unremarkable repeat chest x-ray September 2021. · Chest pain, RBBB, hypertension followed by cardiology  · Morbid obesity - gain 12 pounds since July   · Anxiety and depression-not controlled. · Uncontrolled HTN   · Life long nonsmoker   · No seasonal allergy       Plan:       · PFTs and 6MW  · Continue Albuterol 2 puffs Q4-6 hrs PRN  · Trial of Symbicort 160/4.5 2 puffs BID for 2 to 4 weeks. Stop if no improvement. · Advised to follow-up with PCP regarding uncontrolled anxiety/depression.   Might benefit from antidepressants.   · Advised to monirot BP at home and notify PCP/cardiology if not controlled   · Follow up in 6- 8 weeks

## 2021-12-30 ENCOUNTER — TELEPHONE (OUTPATIENT)
Dept: PULMONOLOGY | Age: 46
End: 2021-12-30

## 2021-12-30 NOTE — TELEPHONE ENCOUNTER
Cancelled patients PFT scheduled on 1/14/22. Called patient and left message informing that PFT was canceled,due to increased Covid cases. Asked patient to call office to confirm that she received the message or with any other questions.

## 2022-02-09 ENCOUNTER — TELEPHONE (OUTPATIENT)
Dept: PULMONOLOGY | Age: 47
End: 2022-02-09

## 2022-02-09 NOTE — TELEPHONE ENCOUNTER
Patient cancelled appointment on 2/10/22 with Dr. Chayo Figueroa for 6-8 week pft f/u. Reason: pt has not completed pfts due to lab closing (see message 12/30/21)    Patient did not reschedule appointment. Appointment rescheduled for will r/s once pt completes pft. Last OV 12/28/21      Assessment:       · Dyspnea- obesity and anxiety are contributing. Doubt underlying pulmonary disease  · Abnormal CT chest 7/4/2021 admitted and treated for pneumonia. Unremarkable repeat chest x-ray September 2021. · Chest pain, RBBB, hypertension followed by cardiology  · Morbid obesity - gain 12 pounds since July   · Anxiety and depression-not controlled. · Uncontrolled HTN   · Life long nonsmoker   · No seasonal allergy       Plan:       · PFTs and 6MW  · Continue Albuterol 2 puffs Q4-6 hrs PRN  · Trial of Symbicort 160/4.5 2 puffs BID for 2 to 4 weeks. Stop if no improvement. · Advised to follow-up with PCP regarding uncontrolled anxiety/depression. Might benefit from antidepressants.   · Advised to monirot BP at home and notify PCP/cardiology if not controlled   · Follow up in 6- 8 weeks

## 2022-04-18 ENCOUNTER — TELEPHONE (OUTPATIENT)
Dept: PULMONOLOGY | Age: 47
End: 2022-04-18

## 2022-04-18 NOTE — TELEPHONE ENCOUNTER
Patient cancelled appointment on 04/19/2022 with Dr Beatris Hector for 6-8 week pft f/u . Reason: Pt has to work    Patient did not reschedule appointment. Appointment rescheduled for n/a; . Last OV 12/28/2021      Assessment:       · Dyspnea- obesity and anxiety are contributing. Doubt underlying pulmonary disease  · Abnormal CT chest 7/4/2021 admitted and treated for pneumonia. Unremarkable repeat chest x-ray September 2021. · Chest pain, RBBB, hypertension followed by cardiology  · Morbid obesity - gain 12 pounds since July   · Anxiety and depression-not controlled. · Uncontrolled HTN   · Life long nonsmoker   · No seasonal allergy       Plan:       · PFTs and 6MW  · Continue Albuterol 2 puffs Q4-6 hrs PRN  · Trial of Symbicort 160/4.5 2 puffs BID for 2 to 4 weeks. Stop if no improvement. · Advised to follow-up with PCP regarding uncontrolled anxiety/depression. Might benefit from antidepressants.   · Advised to monirot BP at home and notify PCP/cardiology if not controlled   · Follow up in 6- 8 weeks

## 2022-06-17 ENCOUNTER — HOSPITAL ENCOUNTER (OUTPATIENT)
Age: 47
Discharge: HOME | End: 2022-06-17
Payer: COMMERCIAL

## 2022-06-17 ENCOUNTER — HOSPITAL ENCOUNTER (OUTPATIENT)
Dept: GENERAL RADIOLOGY | Age: 47
Discharge: HOME | End: 2022-06-17
Payer: COMMERCIAL

## 2022-06-17 DIAGNOSIS — R07.9 CHEST PAIN, UNSPECIFIED TYPE: ICD-10-CM

## 2022-06-17 PROCEDURE — 71046 X-RAY EXAM CHEST 2 VIEWS: CPT

## 2022-06-22 NOTE — PROGRESS NOTES
and right neck. Intermittently occurring, 2 episodes now with the last being 2 days previous to this appointment. Thought she might have a tooth infection but was recently evaluated by dentist and told she is doing fine. States she has been taking lisinopril for some time and is never had symptoms such as this. Reports no associated shortness of breath or tongue swelling. The patient is compliant with medications. Cost of medications is affordable. No endorsed side effects. Past Medical History:   has a past medical history of Chest pain, Hypertension, Pneumonia, and Prolonged emergence from general anesthesia. Surgical History:   has a past surgical history that includes Anterior cruciate ligament repair (Left); Wrist ganglion excision (Left); hysteroscopy (1/23/15); Knee arthroscopy (Left); hysteroscopy (04/04/2018); Carpal tunnel release (Left, 07/2020); and Dilation and curettage of uterus (N/A, 8/27/2020). Social History:   reports that she has never smoked. She has never used smokeless tobacco. She reports that she does not drink alcohol and does not use drugs. She works in [email or web address] in [place]. She has 3 children, all in their 25s. Family History:  family history includes Diabetes in her father; Heart Attack in her father; High Blood Pressure in her father and mother. 6/10 father's siblings had cardiac disease. Father had heart surgery in his 52's. She has brother with no known heart disease. Children with no known heart disease. Home Medications:  Were reviewed and are listed in nursing record and/or below  Prior to Admission medications    Medication Sig Start Date End Date Taking?  Authorizing Provider   albuterol sulfate HFA (PROAIR HFA) 108 (90 Base) MCG/ACT inhaler Inhale 2 puffs into the lungs 7/27/21  Yes Historical Provider, MD   budesonide-formoterol (SYMBICORT) 160-4.5 MCG/ACT AERO Inhale 2 puffs into the lungs 2 times daily 12/28/21  Yes Nadia Powers MD lisinopril (PRINIVIL;ZESTRIL) 10 MG tablet Take 10 mg by mouth daily   Yes Historical Provider, MD        CURRENT Medications:  No current facility-administered medications for this visit. Allergies:  Tioconazole     Review of Systems:   A 14 point review of symptoms completed. Pertinent positives identified in the HPI, all other review of symptoms negative as below. Objective:     Vitals:    07/05/22 1257   BP: 138/88   Pulse: 78   Temp: 98.5 °F (36.9 °C)   SpO2: 98%    Weight: 199 lb 12.8 oz (90.6 kg)       PHYSICAL EXAM:    General:   Middle-age woman in no acute distress, fatigued appearing   Head:  Normocephalic, atraumatic   Eyes:  Conjunctiva/corneas clear, injected sclera bilaterally. Nose: Nares normal, no drainage or sinus tenderness   Throat: No abnormalities of the lips, oral mucosa or tongue. Neck: Trachea midline. Neck supple with no lymphadenopathy, thyroid not enlarged, symmetric, no tenderness/mass/nodules    Lungs:   Clear to auscultation bilaterally, no wheezes, no rales, no respiratory distress   Chest Wall:  No deformity or tenderness to palpation   Heart:  Regular rate and rhythm, normal S1, normal S2, no murmur, no rub, no S3/S4, no heave   Abdomen:   Obese abdomen, soft, non-tender, with normoactive bowel sounds. No masses, no hepatosplenomegaly   Extremities: No cyanosis, clubbing or pitting edema. Vascular: 2+ radial, 2+ dorsalis pedis and posterior tibial pulses bilaterally. Brisk carotid upstrokes without carotid bruit. Skin: Skin color, texture, turgor are normal with no rashes or ulceration. Bluish hue to hands/digits   Pysch:  Normal mood, appropriate affect   Neurologic: Oriented to person, place and time. No slurred speech or facial asymmetry. No motor or sensory deficits on gross examination.          Labs:   CBC:   Lab Results   Component Value Date/Time    WBC 7.0 09/13/2021 04:00 PM    RBC 4.40 09/13/2021 04:00 PM    HGB 13.8 09/13/2021 04:00 PM    HCT 39.8 09/13/2021 04:00 PM    MCV 90.6 09/13/2021 04:00 PM    RDW 13.0 09/13/2021 04:00 PM     09/13/2021 04:00 PM     CMP:  Lab Results   Component Value Date/Time     09/13/2021 04:00 PM    K 3.5 09/13/2021 04:00 PM     09/13/2021 04:00 PM    CO2 25 09/13/2021 04:00 PM    BUN 12 09/13/2021 04:00 PM    CREATININE 0.7 09/13/2021 04:00 PM    GFRAA >60 09/13/2021 04:00 PM    AGRATIO 1.5 09/13/2021 04:00 PM    LABGLOM >60 09/13/2021 04:00 PM    GLUCOSE 181 09/13/2021 04:00 PM    PROT 6.6 09/13/2021 04:00 PM    CALCIUM 8.9 09/13/2021 04:00 PM    BILITOT 0.3 09/13/2021 04:00 PM    ALKPHOS 82 09/13/2021 04:00 PM    AST 18 09/13/2021 04:00 PM    ALT 21 09/13/2021 04:00 PM     PT/INR:  No results found for: PTINR  HgBA1c:No results found for: LABA1C  Lab Results   Component Value Date    TROPONINI <0.01 09/13/2021     Lab Results   Component Value Date    CHOL 141 11/15/2013     Lab Results   Component Value Date    TRIG 89 11/15/2013     Lab Results   Component Value Date    HDL 41 11/10/2021    HDL 32 (L) 11/15/2013     Lab Results   Component Value Date    LDLCALC 122 (H) 11/10/2021    LDLCALC 91 11/15/2013     Lab Results   Component Value Date    LABVLDL 17 11/10/2021    LABVLDL 18 11/15/2013     No results found for: CHOLHDLRATIO      Cardiac Data:     LLE:78/36/0235 from outside hospitals personally reviewed with my interpretation: Normal sinus rhythm, normal axis with QRS <30 degrees, incomplete right bundle branch block which was known previously. GXT Stress Test: 11/10/2021  Summary  There is normal isotope uptake at stress and rest. There is no evidence of  myocardial ischemia or scar. LV function is normal with uniform wall motion  and ejection fraction of 64%. Low risk study. Echo complete: 11/10/2021  Summary  Left ventricular systolic function is low normal with ejection fraction estimated at 50-55 %. No regional wall motion abnormalities are noted.   There is mild concentric left ventricular hypertrophy. Normal left ventricular diastolic filling pressure. Mild mitral regurgitation. Mild tricuspid regurgitation. Normal systolic pulmonary artery pressure (SPAP) estimated at 33 mmHg (RA pressure 8 mmHg). Mild pulmonic regurgitation present. Signature  Electronically signed by Stephen Roberts MD, [facility] (Interpreting physician) on 1    MYOVIEW 11/15/2013  Impression: 1. Apparent reversible perfusion defect involving the left ventricular septum near the base of the heart is felt to likely represent artifact due to misalignment of the short axis resting and stress series. A focus of stress induced myocardial ischemia cannot be excluded and correlation with ECG is advised. 2. LVEF 64%     GXT Stress Test: 11/15/2013  Summary    normal sinus rhythm at rest. Incomplete RBBB. Exercised on treadmill according to standard Trev protocol for5.07 minutes    achieving peak heart rate of 171 which is 95% of predicted maximum heart    rate. c/o SOB and right mid back pain rates at 2-3/10. no c/o chest pain. sob    resolved but pain remains 2-3/10 in right mid back. No EKG changes of stress induced left ventricular ischemia. Myoview was    injected results to follow from radiology. Additional studies:   CXR 9/13/2021  FINDINGS: The cardiomediastinal silhouette is normal in size. Lung volumes are low. The lungs are clear. No pleural effusion or pneumothorax is present. CT Chest/Pulmonary 7/4/2021  FINDINGS: Pulmonary Arteries: Pulmonary arteries are adequately opacified for evaluation. No evidence of intraluminal filling defect to suggest pulmonary embolism. Main pulmonary artery is normal in caliber. Mediastinum: No evidence of mediastinal lymphadenopathy. The heart and pericardium demonstrate no acute abnormality. There is no acute abnormality of the thoracic aorta.      Lungs/pleura: Scattered ground-glass opacities identified throughout much of the left lower lobe, compatible with pneumonia. Remainder lung fields clear. No focal consolidation or pulmonary edema. No evidence of pleural effusion or pneumothorax. Tracheobronchial tree patent. Upper Abdomen: Limited images of the upper abdomen again show multiple gallstones without CT evidence cholecystitis. No dilatation biliary tree. Soft Tissues/Bones: No acute bone or soft tissue abnormality. Heterogeneous thyroid with probable several small nodules on the right, largest likely 9-10 mm. Mild DJD spine. Impression and Plan:      1. Atypical chest discomfort, recent studies are reassuring and normal.  Normal EKG  2. Shortness of breath, situational with her workplace  3. Incomplete right bundle branch block   4. Intermittent facial swelling, cannot exclude angioedema on ACE inhibitor  5. Hypertension, controlled  6. Thyroid nodules by CT  7. Obesity with BMI 33    Patient Active Problem List   Diagnosis    Chest pain    Abnormal uterine bleeding (AUB)    Iron deficiency anemia due to chronic blood loss    Fibroid uterus    Pneumonia    Hypertension    Dyspnea on exertion    Fatigue    Abnormal EKG       PLAN:  1. Stop lisinopril today. Start amlodipine 5 mg once daily. 90-day prescription given. We will have her follow-up with PCP/Dr. Linn Gray for blood pressure medication refills and monitoring. 2. Reviewed stress test and echo results with her today which are reassuring  3. Monitor blood pressures few times weekly moving forward. 4. I encouraged modest weight loss through implementing appropriate dietary measures as well as initiation of a graded exercise program with the ultimate goal of 150 minutes of aerobic exercise weekly        Follow-up with me prn    This note is scribed in the presence of Otoniel Stroud by Arden Apodaca RN    The scribes documentation has been prepared under my direction and personally reviewed by me in its entirety.   I confirm that the note above accurately reflects all work, treatment, procedures, and medical decision making performed by me. Bonnie Avery MD, personally performed the services described in this documentation as scribed by Milagros Bauman RN in my presence, and it is both accurate and complete to the best of our ability. I will address the patient's cardiac risk factors and adjusted pharmacologic treatment as needed. In addition, I have reinforced the need for patient directed risk factor modification. All questions and concerns were addressed to the patient/family. Alternatives to my treatment were discussed. Thank you for allowing us to participate in the care of [facility]. Please call me with any questions [phone].     Gale Burch MD, [facility]  Cardiovascular Disease  [address]  [phone] [address] [facility]  [phone] [address] [place] [address]  7/5/2022 1:01 PM

## 2022-07-05 ENCOUNTER — OFFICE VISIT (OUTPATIENT)
Dept: CARDIOLOGY CLINIC | Age: 47
End: 2022-07-05
Payer: COMMERCIAL

## 2022-07-05 VITALS
BODY MASS INDEX: 33.29 KG/M2 | OXYGEN SATURATION: 98 % | WEIGHT: 199.8 LBS | TEMPERATURE: 98.5 F | SYSTOLIC BLOOD PRESSURE: 138 MMHG | HEIGHT: 65 IN | HEART RATE: 78 BPM | DIASTOLIC BLOOD PRESSURE: 88 MMHG

## 2022-07-05 DIAGNOSIS — R94.31 ABNORMAL EKG: ICD-10-CM

## 2022-07-05 DIAGNOSIS — I10 PRIMARY HYPERTENSION: Primary | ICD-10-CM

## 2022-07-05 PROCEDURE — 99214 OFFICE O/P EST MOD 30 MIN: CPT | Performed by: INTERNAL MEDICINE

## 2022-07-05 RX ORDER — AMLODIPINE BESYLATE 5 MG/1
5 TABLET ORAL DAILY
Qty: 90 TABLET | Refills: 0 | Status: SHIPPED | OUTPATIENT
Start: 2022-07-05 | End: 2022-10-18

## 2022-07-05 NOTE — PATIENT INSTRUCTIONS
PLAN:  1. Stop lisinopril today. Then follow up with Dr. Babs Albrecht for blood pressure medication refills and monitoring. 2. Reviewed stress test and echo  3. Start Amlodipine 5 mg daily. Prescription called in to start. 4.Monitor blood pressures.   5.We encouraged modest weight loss through implementing appropriate dietary measures as well as initiation of a graded exercise program with the ultimate goal of 150 minutes of aerobic exercise weekly    Follow-up with milad franciscon

## 2022-10-18 ENCOUNTER — HOSPITAL ENCOUNTER (EMERGENCY)
Age: 47
Discharge: HOME | End: 2022-10-18
Attending: EMERGENCY MEDICINE
Payer: COMMERCIAL

## 2022-10-18 ENCOUNTER — HOSPITAL ENCOUNTER (OUTPATIENT)
Dept: ULTRASOUND IMAGING | Age: 47
Discharge: HOME | End: 2022-10-18
Payer: COMMERCIAL

## 2022-10-18 VITALS
HEART RATE: 80 BPM | SYSTOLIC BLOOD PRESSURE: 148 MMHG | RESPIRATION RATE: 18 BRPM | DIASTOLIC BLOOD PRESSURE: 88 MMHG | HEIGHT: 65 IN | OXYGEN SATURATION: 99 % | BODY MASS INDEX: 33.15 KG/M2 | TEMPERATURE: 98.3 F | WEIGHT: 199 LBS

## 2022-10-18 DIAGNOSIS — R10.13 ABDOMINAL PAIN, EPIGASTRIC: Primary | ICD-10-CM

## 2022-10-18 DIAGNOSIS — R10.13 ABDOMINAL PAIN, EPIGASTRIC: ICD-10-CM

## 2022-10-18 LAB
A/G RATIO: 1.8 (ref 1.1–2.2)
ALBUMIN SERPL-MCNC: 4.2 G/DL (ref 3.4–5)
ALP BLD-CCNC: 102 U/L (ref 40–129)
ALT SERPL-CCNC: 43 U/L (ref 10–40)
ANION GAP SERPL CALCULATED.3IONS-SCNC: 10 MMOL/L (ref 3–16)
AST SERPL-CCNC: 60 U/L (ref 15–37)
BACTERIA: ABNORMAL /HPF
BASOPHILS ABSOLUTE: 0 K/UL (ref 0–0.2)
BASOPHILS RELATIVE PERCENT: 0.5 %
BILIRUB SERPL-MCNC: 0.6 MG/DL (ref 0–1)
BILIRUBIN URINE: NEGATIVE
BLOOD, URINE: ABNORMAL
BUN BLDV-MCNC: 13 MG/DL (ref 7–20)
CALCIUM SERPL-MCNC: 9.9 MG/DL (ref 8.3–10.6)
CHLORIDE BLD-SCNC: 106 MMOL/L (ref 99–110)
CLARITY: ABNORMAL
CO2: 29 MMOL/L (ref 21–32)
COLOR: YELLOW
CREAT SERPL-MCNC: 0.6 MG/DL (ref 0.6–1.1)
EOSINOPHILS ABSOLUTE: 0.1 K/UL (ref 0–0.6)
EOSINOPHILS RELATIVE PERCENT: 1.2 %
EPITHELIAL CELLS, UA: ABNORMAL /HPF (ref 0–5)
GFR SERPL CREATININE-BSD FRML MDRD: >60 ML/MIN/{1.73_M2}
GLUCOSE BLD-MCNC: 105 MG/DL (ref 70–99)
GLUCOSE URINE: NEGATIVE MG/DL
HCG(URINE) PREGNANCY TEST: NEGATIVE
HCT VFR BLD CALC: 41.4 % (ref 36–48)
HEMOGLOBIN: 14.8 G/DL (ref 12–16)
KETONES, URINE: NEGATIVE MG/DL
LEUKOCYTE ESTERASE, URINE: NEGATIVE
LYMPHOCYTES ABSOLUTE: 2.1 K/UL (ref 1–5.1)
LYMPHOCYTES RELATIVE PERCENT: 29.9 %
MCH RBC QN AUTO: 31.4 PG (ref 26–34)
MCHC RBC AUTO-ENTMCNC: 35.6 G/DL (ref 31–36)
MCV RBC AUTO: 88.2 FL (ref 80–100)
MICROSCOPIC EXAMINATION: YES
MONOCYTES ABSOLUTE: 0.5 K/UL (ref 0–1.3)
MONOCYTES RELATIVE PERCENT: 7.1 %
NEUTROPHILS ABSOLUTE: 4.3 K/UL (ref 1.7–7.7)
NEUTROPHILS RELATIVE PERCENT: 61.3 %
NITRITE, URINE: NEGATIVE
PDW BLD-RTO: 13 % (ref 12.4–15.4)
PH UA: 6 (ref 5–8)
PLATELET # BLD: 267 K/UL (ref 135–450)
PMV BLD AUTO: 7.7 FL (ref 5–10.5)
POTASSIUM REFLEX MAGNESIUM: 3.7 MMOL/L (ref 3.5–5.1)
PROTEIN UA: NEGATIVE MG/DL
RBC # BLD: 4.69 M/UL (ref 4–5.2)
RBC UA: ABNORMAL /HPF (ref 0–4)
SODIUM BLD-SCNC: 145 MMOL/L (ref 136–145)
SPECIFIC GRAVITY UA: 1.02 (ref 1–1.03)
TOTAL PROTEIN: 6.6 G/DL (ref 6.4–8.2)
URINE TYPE: ABNORMAL
UROBILINOGEN, URINE: 0.2 E.U./DL
WBC # BLD: 7.1 K/UL (ref 4–11)
WBC UA: ABNORMAL /HPF (ref 0–5)

## 2022-10-18 PROCEDURE — 80053 COMPREHEN METABOLIC PANEL: CPT

## 2022-10-18 PROCEDURE — 84703 CHORIONIC GONADOTROPIN ASSAY: CPT

## 2022-10-18 PROCEDURE — 81001 URINALYSIS AUTO W/SCOPE: CPT

## 2022-10-18 PROCEDURE — 96374 THER/PROPH/DIAG INJ IV PUSH: CPT

## 2022-10-18 PROCEDURE — 36415 COLL VENOUS BLD VENIPUNCTURE: CPT

## 2022-10-18 PROCEDURE — 99284 EMERGENCY DEPT VISIT MOD MDM: CPT

## 2022-10-18 PROCEDURE — 6360000002 HC RX W HCPCS: Performed by: EMERGENCY MEDICINE

## 2022-10-18 PROCEDURE — 85025 COMPLETE CBC W/AUTO DIFF WBC: CPT

## 2022-10-18 PROCEDURE — 76705 ECHO EXAM OF ABDOMEN: CPT

## 2022-10-18 RX ORDER — ONDANSETRON HYDROCHLORIDE 8 MG/1
8 TABLET, FILM COATED ORAL EVERY 8 HOURS PRN
Qty: 10 TABLET | Refills: 0 | Status: SHIPPED | OUTPATIENT
Start: 2022-10-18

## 2022-10-18 RX ORDER — ONDANSETRON 2 MG/ML
4 INJECTION INTRAMUSCULAR; INTRAVENOUS ONCE
Status: COMPLETED | OUTPATIENT
Start: 2022-10-18 | End: 2022-10-18

## 2022-10-18 RX ADMIN — ONDANSETRON HYDROCHLORIDE 4 MG: 2 INJECTION, SOLUTION INTRAMUSCULAR; INTRAVENOUS at 13:06

## 2022-10-18 ASSESSMENT — ENCOUNTER SYMPTOMS
CHOKING: 0
SORE THROAT: 0
VOMITING: 0
COUGH: 0
BACK PAIN: 0
ABDOMINAL PAIN: 1
STRIDOR: 0
NAUSEA: 1
CHEST TIGHTNESS: 0
RECTAL PAIN: 0
WHEEZING: 0
DIARRHEA: 0
SHORTNESS OF BREATH: 0

## 2022-10-18 ASSESSMENT — PAIN DESCRIPTION - FREQUENCY: FREQUENCY: INTERMITTENT

## 2022-10-18 ASSESSMENT — PAIN DESCRIPTION - ORIENTATION: ORIENTATION: MID;UPPER

## 2022-10-18 ASSESSMENT — PAIN DESCRIPTION - LOCATION: LOCATION: ABDOMEN

## 2022-10-18 ASSESSMENT — PAIN SCALES - GENERAL: PAINLEVEL_OUTOF10: 4

## 2022-10-18 ASSESSMENT — PAIN - FUNCTIONAL ASSESSMENT
PAIN_FUNCTIONAL_ASSESSMENT: 0-10
PAIN_FUNCTIONAL_ASSESSMENT: NONE - DENIES PAIN
PAIN_FUNCTIONAL_ASSESSMENT: NONE - DENIES PAIN

## 2022-10-18 ASSESSMENT — PAIN DESCRIPTION - DESCRIPTORS: DESCRIPTORS: SHARP

## 2022-10-18 NOTE — Clinical Note
Jeo Luna was seen and treated in our emergency department on 10/18/2022. She may return to work on 10/20/2022. If you have any questions or concerns, please don't hesitate to call.       Lacey Schmitt MD

## 2022-10-18 NOTE — DISCHARGE INSTRUCTIONS
Clear liquid diet i.e. 7-Up, Jell-O, salt crackers,  Proceed to [facility] to go to the ultrasound suite for a gallbladder ultrasound to be done at 230  Results are to be given to Dr. Dianne Burgos to the hospital if you worsen overnight  Jose Daniel Nur will refer you to surgery or GI depending on the results

## 2022-10-18 NOTE — ED NOTES
Called [facility] ultrasound to see if they had an opening today to get a ultra sound. PT has been Npo since 9am. They advised that they had an opening at 1430 today. Dr Hans Almanzar states that he will order it. Called [facility] to speak with Dr. Gillian Thomason who is the Pts PCP for Dr. Hans Almanzar to speak with him about the ultrasound.       Cynthia Mart  10/18/22 [id number]

## 2022-10-18 NOTE — ED NOTES
Pt advised to remain NPO and go directly to [facility].  Pt &  verbalize understanding     Cristóbal Metcalf RN  10/18/22 [id number]

## 2022-10-18 NOTE — ED PROVIDER NOTES
[address] [facility] Name: Jose Aponte  MRN: [id number]  [place] 1975  Date of evaluation: 10/18/2022  Provider: Eliza Ocampo MD    [address]       Chief Complaint   Patient presents with    Abdominal Pain     C/o intermittent mid epigastric abd pain with nausea         HISTORY OF PRESENT ILLNESS   (Location/Symptom, Timing/Onset, Context/Setting, Quality, Duration, Modifying Factors, Severity)  Note limiting factors. Jose Aponte is a 50 y.o. female who presents to the emergency department     Patient presents with a 1 week history of some intermittent epigastric pain worse after eating. Patient is 50years old, female, moderately overweight, patient is also fertile she did have ablation therapy. She says she gets nauseated with this epigastric discomfort she has been belching more she denies any fever she presents with stable vital signs she has no other medical problems no prior abdominal surgeries. Patient is  3 para 3      The history is provided by the patient and the spouse. Nursing Notes were reviewed. REVIEW OF SYSTEMS    (2-9 systems for level 4, 10 or more for level 5)     Review of Systems   Constitutional:  Positive for activity change and appetite change. Negative for chills, diaphoresis, fatigue and fever. HENT:  Negative for congestion and sore throat. Respiratory:  Negative for cough, choking, chest tightness, shortness of breath, wheezing and stridor. Cardiovascular:  Negative for chest pain, palpitations and leg swelling. Gastrointestinal:  Positive for abdominal pain and nausea. Negative for diarrhea, rectal pain and vomiting. Genitourinary:  Negative for flank pain and urgency. Musculoskeletal:  Negative for back pain and myalgias. Allergic/Immunologic: Negative for immunocompromised state. Neurological:  Negative for light-headedness and headaches.    All other systems reviewed and are negative. Except as noted above the remainder of the review of systems was reviewed and negative. PAST MEDICAL HISTORY     Past Medical History:   Diagnosis Date    Chest pain 11/14/2013    Hypertension     Pneumonia     Prolonged emergence from general anesthesia          SURGICAL HISTORY       Past Surgical History:   Procedure Laterality Date    ANTERIOR CRUCIATE LIGAMENT REPAIR Left     CARPAL TUNNEL RELEASE Left 07/2020    DILATION AND CURETTAGE OF UTERUS N/A 8/27/2020    VIDEO HYSTEROSCOPY DILATATION AND CURETTAGE, Philly Stevens performed by Rafaela Colindres MD at [address] [place] [address] HYSTEROSCOPY  1/23/15    polypectomy, D&C myosure    HYSTEROSCOPY  04/04/2018    KNEE ARTHROSCOPY Left     X 2    WRIST GANGLION EXCISION Left          CURRENT MEDICATIONS       Previous Medications    No medications on file       ALLERGIES     Lisinopril and Tioconazole    FAMILY HISTORY       Family History   Problem Relation Age of Onset    High Blood Pressure Mother     Diabetes Father     Heart Attack Father     High Blood Pressure Father           SOCIAL HISTORY       Social History     Socioeconomic History    Marital status:      Spouse name: None    Number of children: None    Years of education: None    Highest education level: None   Tobacco Use    Smoking status: Never    Smokeless tobacco: Never   Vaping Use    Vaping Use: Never used   Substance and Sexual Activity    Alcohol use: No    Drug use: No       SCREENINGS               PHYSICAL EXAM    (up to 7 for level 4, 8 or more for level 5)     ED Triage Vitals [10/18/22 1202]   BP Temp Temp Source Heart Rate Resp SpO2 Height Weight   (!) 185/102 97.7 °F (36.5 °C) Oral 71 18 100 % 5' 5\" (1.651 m) 199 lb (90.3 kg)       Physical Exam  Vitals and nursing note reviewed. Constitutional:       General: She is not in acute distress. Appearance: She is well-developed. She is ill-appearing.  She is not toxic-appearing or diaphoretic. HENT:      Head: Normocephalic. Right Ear: Ear canal and external ear normal.      Left Ear: Ear canal and external ear normal.      Nose: Nose normal.      Mouth/Throat:      Mouth: Mucous membranes are moist.   Eyes:      Extraocular Movements: Extraocular movements intact. Conjunctiva/sclera: Conjunctivae normal.      Pupils: Pupils are equal, round, and reactive to light. Neck:      Thyroid: No thyromegaly. Cardiovascular:      Rate and Rhythm: Regular rhythm. Tachycardia present. Heart sounds: Normal heart sounds. No murmur heard. No friction rub. No gallop. Pulmonary:      Effort: Pulmonary effort is normal. No respiratory distress. Breath sounds: Normal breath sounds. Abdominal:      General: Abdomen is flat. Bowel sounds are normal. There is no distension. Palpations: Abdomen is soft. Tenderness: There is abdominal tenderness in the epigastric area. There is no guarding or rebound. Positive signs include Zavala's sign. Negative signs include Rovsing's sign, McBurney's sign, psoas sign and obturator sign. Musculoskeletal:      Cervical back: Normal range of motion and neck supple. Skin:     General: Skin is warm. Neurological:      Mental Status: She is alert and oriented to person, place, and time. GCS: GCS eye subscore is 4. GCS verbal subscore is 5. GCS motor subscore is 6. Cranial Nerves: No cranial nerve deficit. Sensory: No sensory deficit. Motor: No abnormal muscle tone.       Coordination: Coordination normal.      Deep Tendon Reflexes: Reflexes normal.   Psychiatric:         Behavior: Behavior normal.       DIAGNOSTIC RESULTS     EKG: All EKG's are interpreted by the Emergency Department Physician who either signs or Co-signs this chart in the absence of a cardiologist.        RADIOLOGY:   Non-plain film images such as CT, Ultrasound and MRI are read by the radiologist. Plain radiographic images are visualized and preliminarily interpreted by the emergency physician with the below findings:        Interpretation per the Radiologist below, if available at the time of this note:    [address]    (Results Pending)           LABS:  Results for orders placed or performed during the hospital encounter of 10/18/22   Urinalysis   Result Value Ref Range    Color, UA Yellow Straw/Yellow    Clarity, UA SL CLOUDY (A) Clear    Glucose, Ur Negative Negative mg/dL    Bilirubin Urine Negative Negative    Ketones, Urine Negative Negative mg/dL    Specific Gravity, UA 1.020 1.005 - 1.030    Blood, Urine TRACE-INTACT (A) Negative    pH, UA 6.0 5.0 - 8.0    Protein, UA Negative Negative mg/dL    Urobilinogen, Urine 0.2 <2.0 E.U./dL    Nitrite, Urine Negative Negative    Leukocyte Esterase, Urine Negative Negative    Microscopic Examination YES     Urine Type NotGiven    Pregnancy, Urine   Result Value Ref Range    HCG(Urine) Pregnancy Test Negative Detects HCG level >20 MIU/mL   CBC with Auto Differential   Result Value Ref Range    WBC 7.1 4.0 - 11.0 K/uL    RBC 4.69 4.00 - 5.20 M/uL    Hemoglobin 14.8 12.0 - 16.0 g/dL    Hematocrit 41.4 36.0 - 48.0 %    MCV 88.2 80.0 - 100.0 fL    MCH 31.4 26.0 - 34.0 pg    MCHC 35.6 31.0 - 36.0 g/dL    RDW 13.0 12.4 - 15.4 %    Platelets 992 464 - 032 K/uL    MPV 7.7 5.0 - 10.5 fL    Neutrophils % 61.3 %    Lymphocytes % 29.9 %    Monocytes % 7.1 %    Eosinophils % 1.2 %    Basophils % 0.5 %    Neutrophils Absolute 4.3 1.7 - 7.7 K/uL    Lymphocytes Absolute 2.1 1.0 - 5.1 K/uL    Monocytes Absolute 0.5 0.0 - 1.3 K/uL    Eosinophils Absolute 0.1 0.0 - 0.6 K/uL    Basophils Absolute 0.0 0.0 - 0.2 K/uL   Comprehensive Metabolic Panel w/ Reflex to MG   Result Value Ref Range    Sodium 145 136 - 145 mmol/L    Potassium reflex Magnesium 3.7 3.5 - 5.1 mmol/L    Chloride 106 99 - 110 mmol/L    CO2 29 21 - 32 mmol/L    Anion Gap 10 3 - 16    Glucose 105 (H) 70 - 99 mg/dL    BUN 13 7 - 20 mg/dL    Creatinine 0.6 0.6 - 1.1 mg/dL    Est, Glom Filt Rate >60 >60    Calcium 9.9 8.3 - 10.6 mg/dL    Total Protein 6.6 6.4 - 8.2 g/dL    Albumin 4.2 3.4 - 5.0 g/dL    Albumin/Globulin Ratio 1.8 1.1 - 2.2    Total Bilirubin 0.6 0.0 - 1.0 mg/dL    Alkaline Phosphatase 102 40 - 129 U/L    ALT 43 (H) 10 - 40 U/L    AST 60 (H) 15 - 37 U/L   Microscopic Urinalysis   Result Value Ref Range    WBC, UA 0-2 0 - 5 /HPF    RBC, UA 3-4 0 - 4 /HPF    Epithelial Cells, UA 6-10 (A) 0 - 5 /HPF    Bacteria, UA 1+ (A) None Seen /HPF            EMERGENCY DEPARTMENT COURSE and DIFFERENTIAL DIAGNOSIS/MDM:     Vitals:    10/18/22 1202   BP: (!) 185/102   Pulse: 71   Resp: 18   Temp: 97.7 °F (36.5 °C)   TempSrc: Oral   SpO2: 100%   Weight: 199 lb (90.3 kg)   Height: 5' 5\" (1.651 m)           MDM        REASSESSMENT          CRITICAL CARE TIME     CONSULTS:  None      PROCEDURES:     Procedures    MEDICATIONS GIVEN THIS VISIT:  Medications   ondansetron (ZOFRAN) injection 4 mg (4 mg IntraVENous Given 10/18/22 1306)        FINAL IMPRESSION      1. Abdominal pain, epigastric            DISPOSITION/PLAN   DISPOSITION Decision To Discharge 10/18/2022 01:25:35 PM      PATIENT REFERRED TO:  Jose Daniel Nur  Please call for the results before 5 PM or after 8 AM tomorrow    For the results and referral      DISCHARGE MEDICATIONS:  New Prescriptions    ONDANSETRON (ZOFRAN) 8 MG TABLET    Take 1 tablet by mouth every 8 hours as needed for Nausea       Controlled Substances Monitoring  No flowsheet data found. (Please note that portions of this note were completed with a voice recognition program.  Efforts were made to edit the dictations but occasionally words are mis-transcribed.)    Patient was advised to return to the Emergency Department if there was any worsening.     Ton Jarrell MD (electronically signed)  Attending Emergency Physician          Jerry Tan MD  10/18/22 2292

## 2022-10-24 ENCOUNTER — INITIAL CONSULT (OUTPATIENT)
Dept: SURGERY | Age: 47
End: 2022-10-24
Payer: COMMERCIAL

## 2022-10-24 VITALS
SYSTOLIC BLOOD PRESSURE: 160 MMHG | DIASTOLIC BLOOD PRESSURE: 98 MMHG | HEIGHT: 65 IN | WEIGHT: 191.2 LBS | HEART RATE: 73 BPM | BODY MASS INDEX: 31.86 KG/M2

## 2022-10-24 DIAGNOSIS — K80.10 CHRONIC CALCULOUS CHOLECYSTITIS: Primary | ICD-10-CM

## 2022-10-24 PROCEDURE — 99203 OFFICE O/P NEW LOW 30 MIN: CPT | Performed by: SURGERY

## 2022-10-24 PROCEDURE — 3074F SYST BP LT 130 MM HG: CPT | Performed by: SURGERY

## 2022-10-24 PROCEDURE — 3078F DIAST BP <80 MM HG: CPT | Performed by: SURGERY

## 2022-10-24 RX ORDER — SODIUM CHLORIDE 0.9 % (FLUSH) 0.9 %
5-40 SYRINGE (ML) INJECTION PRN
Status: CANCELLED | OUTPATIENT
Start: 2022-10-24

## 2022-10-24 RX ORDER — HEPARIN SODIUM 5000 [USP'U]/ML
5000 INJECTION, SOLUTION INTRAVENOUS; SUBCUTANEOUS ONCE
Status: CANCELLED | OUTPATIENT
Start: 2022-10-24

## 2022-10-24 RX ORDER — SODIUM CHLORIDE 0.9 % (FLUSH) 0.9 %
5-40 SYRINGE (ML) INJECTION EVERY 12 HOURS SCHEDULED
Status: CANCELLED | OUTPATIENT
Start: 2022-10-24

## 2022-10-24 RX ORDER — SODIUM CHLORIDE 9 MG/ML
INJECTION, SOLUTION INTRAVENOUS PRN
Status: CANCELLED | OUTPATIENT
Start: 2022-10-24

## 2022-10-25 DIAGNOSIS — K80.10 CCC (CHRONIC CALCULOUS CHOLECYSTITIS): ICD-10-CM

## 2022-11-02 NOTE — PROGRESS NOTES
PRE OP INSTRUCTION SHEET   1. Do not eat or drink anything after 12 midnight  prior to surgery. This includes no water, chewing gum or mints. 2. Take the following pills will a small sip of water (see MAR)                                        3. Aspirin, Ibuprofen, Advil, Naproxen, Vitamin E, fish oil and other Anti-inflammatory products should be stopped for 5 days before surgery or as directed by your physician. 4. Check with your Doctor regarding stopping Plavix, Coumadin, Lovenox, Fragmin or other blood thinners   5. Do not smoke, and do not drink any alcoholic beverages 24 hours prior to surgery. This includes NA Beer. 6. You may brush your teeth and gargle the morning of surgery. DO NOT SWALLOW WATER   7. You MUST make arrangements for a responsible adult to take you home after your surgery. You will not be allowed to leave alone or drive yourself home. It is strongly suggested someone stay with you the first 24 hrs. Your surgery will be cancelled if you do not have a ride home. 8. A parent/legal guardian must accompany a child scheduled for surgery and plan to stay at the hospital until the child is discharged. Please do not bring other children with you. 9. Please wear simple, loose fitting clothing to the hospital.  Arthea Hippo not bring valuables (money, credit cards, checkbooks, etc.) Do not wear any makeup (including no eye makeup) or nail polish on your fingers or toes. 10. DO NOT wear any jewelry or piercings on day of surgery. All body piercing jewelry must be removed. 11. If you have dentures,glasses, or contacts they will be removed before going to the OR; we will provide you a container. 12. Please see your family doctor/and cardiologist for a history & physical and/or concerning medications. Bring any test results/reports from your physician's office. Have history and labs faxed to [phone].  Remember to bring Blood Bank bracelet on the day of surgery. 14. If you have a Living Will and Durable Power of  for Healthcare, please bring in a copy. 13. Notify your Surgeon if you develop any illness between now and surgery  time, cough, cold, fever, sore throat, nausea, vomiting, etc.  Please notify your surgeon if you experience dizziness, shortness of breath or blurred vision between now & the time of your surgery   16. DO NOT shave your operative site 96 hours prior to surgery. For face & neck surgery, men may use an electric razor 48 hours prior to surgery. 17. Shower with _x__Antibacterial soap (x_chlorhexidine for total joint  Pt's) shower two times before surgery.(the morning of and the night before. 18. To provide excellent care visitors will be limited to one in the room at any given time.   Please call pre admission testing if you any further questions [phone] or [phone]

## 2022-11-06 ENCOUNTER — ANESTHESIA EVENT (OUTPATIENT)
Dept: OPERATING ROOM | Age: 47
End: 2022-11-06
Payer: COMMERCIAL

## 2022-11-07 ENCOUNTER — ANESTHESIA (OUTPATIENT)
Dept: OPERATING ROOM | Age: 47
End: 2022-11-07
Payer: COMMERCIAL

## 2022-11-07 ENCOUNTER — HOSPITAL ENCOUNTER (OUTPATIENT)
Age: 47
Setting detail: OUTPATIENT SURGERY
Discharge: HOME | End: 2022-11-07
Attending: SURGERY | Admitting: SURGERY
Payer: COMMERCIAL

## 2022-11-07 VITALS
DIASTOLIC BLOOD PRESSURE: 77 MMHG | HEART RATE: 77 BPM | WEIGHT: 193 LBS | SYSTOLIC BLOOD PRESSURE: 133 MMHG | OXYGEN SATURATION: 94 % | RESPIRATION RATE: 29 BRPM | BODY MASS INDEX: 32.15 KG/M2 | TEMPERATURE: 98.6 F | HEIGHT: 65 IN

## 2022-11-07 DIAGNOSIS — K80.10 CHRONIC CALCULOUS CHOLECYSTITIS: ICD-10-CM

## 2022-11-07 LAB
ALBUMIN SERPL-MCNC: 4 G/DL (ref 3.4–5)
ALP BLD-CCNC: 92 U/L (ref 40–129)
ALT SERPL-CCNC: 20 U/L (ref 10–40)
AST SERPL-CCNC: 22 U/L (ref 15–37)
BILIRUB SERPL-MCNC: 0.6 MG/DL (ref 0–1)
BILIRUBIN DIRECT: <0.2 MG/DL (ref 0–0.3)
BILIRUBIN, INDIRECT: ABNORMAL MG/DL (ref 0–1)
PREGNANCY, URINE: NEGATIVE
TOTAL PROTEIN: 6.1 G/DL (ref 6.4–8.2)

## 2022-11-07 PROCEDURE — 6360000002 HC RX W HCPCS: Performed by: ANESTHESIOLOGY

## 2022-11-07 PROCEDURE — 3600000004 HC SURGERY LEVEL 4 BASE: Performed by: SURGERY

## 2022-11-07 PROCEDURE — 2580000003 HC RX 258: Performed by: ANESTHESIOLOGY

## 2022-11-07 PROCEDURE — 2580000003 HC RX 258: Performed by: SURGERY

## 2022-11-07 PROCEDURE — 88304 TISSUE EXAM BY PATHOLOGIST: CPT

## 2022-11-07 PROCEDURE — 2500000003 HC RX 250 WO HCPCS: Performed by: NURSE ANESTHETIST, CERTIFIED REGISTERED

## 2022-11-07 PROCEDURE — 36415 COLL VENOUS BLD VENIPUNCTURE: CPT

## 2022-11-07 PROCEDURE — 3600000014 HC SURGERY LEVEL 4 ADDTL 15MIN: Performed by: SURGERY

## 2022-11-07 PROCEDURE — 6360000002 HC RX W HCPCS

## 2022-11-07 PROCEDURE — 6360000002 HC RX W HCPCS: Performed by: NURSE ANESTHETIST, CERTIFIED REGISTERED

## 2022-11-07 PROCEDURE — 6370000000 HC RX 637 (ALT 250 FOR IP): Performed by: ANESTHESIOLOGY

## 2022-11-07 PROCEDURE — 6360000002 HC RX W HCPCS: Performed by: SURGERY

## 2022-11-07 PROCEDURE — 84703 CHORIONIC GONADOTROPIN ASSAY: CPT

## 2022-11-07 PROCEDURE — 47562 LAPAROSCOPIC CHOLECYSTECTOMY: CPT | Performed by: SURGERY

## 2022-11-07 PROCEDURE — 3700000001 HC ADD 15 MINUTES (ANESTHESIA): Performed by: SURGERY

## 2022-11-07 PROCEDURE — 7100000000 HC PACU RECOVERY - FIRST 15 MIN: Performed by: SURGERY

## 2022-11-07 PROCEDURE — 2500000003 HC RX 250 WO HCPCS: Performed by: SURGERY

## 2022-11-07 PROCEDURE — 3700000000 HC ANESTHESIA ATTENDED CARE: Performed by: SURGERY

## 2022-11-07 PROCEDURE — 7100000011 HC PHASE II RECOVERY - ADDTL 15 MIN: Performed by: SURGERY

## 2022-11-07 PROCEDURE — 80076 HEPATIC FUNCTION PANEL: CPT

## 2022-11-07 PROCEDURE — 2709999900 HC NON-CHARGEABLE SUPPLY: Performed by: SURGERY

## 2022-11-07 PROCEDURE — 7100000010 HC PHASE II RECOVERY - FIRST 15 MIN: Performed by: SURGERY

## 2022-11-07 PROCEDURE — 7100000001 HC PACU RECOVERY - ADDTL 15 MIN: Performed by: SURGERY

## 2022-11-07 RX ORDER — OXYCODONE HYDROCHLORIDE 5 MG/1
5 TABLET ORAL EVERY 6 HOURS PRN
Qty: 24 TABLET | Refills: 0 | Status: SHIPPED | OUTPATIENT
Start: 2022-11-07 | End: 2022-11-14

## 2022-11-07 RX ORDER — SODIUM CHLORIDE 9 MG/ML
INJECTION, SOLUTION INTRAVENOUS PRN
Status: DISCONTINUED | OUTPATIENT
Start: 2022-11-07 | End: 2022-11-07 | Stop reason: HOSPADM

## 2022-11-07 RX ORDER — MIDAZOLAM HYDROCHLORIDE 1 MG/ML
INJECTION INTRAMUSCULAR; INTRAVENOUS PRN
Status: DISCONTINUED | OUTPATIENT
Start: 2022-11-07 | End: 2022-11-07 | Stop reason: SDUPTHER

## 2022-11-07 RX ORDER — SODIUM CHLORIDE 9 MG/ML
25 INJECTION, SOLUTION INTRAVENOUS PRN
Status: DISCONTINUED | OUTPATIENT
Start: 2022-11-07 | End: 2022-11-07 | Stop reason: HOSPADM

## 2022-11-07 RX ORDER — HYDRALAZINE HYDROCHLORIDE 20 MG/ML
INJECTION INTRAMUSCULAR; INTRAVENOUS PRN
Status: DISCONTINUED | OUTPATIENT
Start: 2022-11-07 | End: 2022-11-07 | Stop reason: SDUPTHER

## 2022-11-07 RX ORDER — KETOROLAC TROMETHAMINE 30 MG/ML
INJECTION, SOLUTION INTRAMUSCULAR; INTRAVENOUS PRN
Status: DISCONTINUED | OUTPATIENT
Start: 2022-11-07 | End: 2022-11-07 | Stop reason: SDUPTHER

## 2022-11-07 RX ORDER — MIDAZOLAM HYDROCHLORIDE 1 MG/ML
1 INJECTION INTRAMUSCULAR; INTRAVENOUS EVERY 5 MIN PRN
Status: DISCONTINUED | OUTPATIENT
Start: 2022-11-07 | End: 2022-11-07 | Stop reason: HOSPADM

## 2022-11-07 RX ORDER — FENTANYL CITRATE 50 UG/ML
INJECTION, SOLUTION INTRAMUSCULAR; INTRAVENOUS PRN
Status: DISCONTINUED | OUTPATIENT
Start: 2022-11-07 | End: 2022-11-07 | Stop reason: SDUPTHER

## 2022-11-07 RX ORDER — PROPOFOL 10 MG/ML
INJECTION, EMULSION INTRAVENOUS PRN
Status: DISCONTINUED | OUTPATIENT
Start: 2022-11-07 | End: 2022-11-07 | Stop reason: SDUPTHER

## 2022-11-07 RX ORDER — LABETALOL HYDROCHLORIDE 5 MG/ML
INJECTION, SOLUTION INTRAVENOUS PRN
Status: DISCONTINUED | OUTPATIENT
Start: 2022-11-07 | End: 2022-11-07 | Stop reason: SDUPTHER

## 2022-11-07 RX ORDER — ONDANSETRON 2 MG/ML
INJECTION INTRAMUSCULAR; INTRAVENOUS PRN
Status: DISCONTINUED | OUTPATIENT
Start: 2022-11-07 | End: 2022-11-07 | Stop reason: SDUPTHER

## 2022-11-07 RX ORDER — BUPIVACAINE HYDROCHLORIDE 5 MG/ML
INJECTION, SOLUTION EPIDURAL; INTRACAUDAL PRN
Status: DISCONTINUED | OUTPATIENT
Start: 2022-11-07 | End: 2022-11-07 | Stop reason: ALTCHOICE

## 2022-11-07 RX ORDER — ROCURONIUM BROMIDE 10 MG/ML
INJECTION, SOLUTION INTRAVENOUS PRN
Status: DISCONTINUED | OUTPATIENT
Start: 2022-11-07 | End: 2022-11-07 | Stop reason: SDUPTHER

## 2022-11-07 RX ORDER — SODIUM CHLORIDE 0.9 % (FLUSH) 0.9 %
5-40 SYRINGE (ML) INJECTION PRN
Status: DISCONTINUED | OUTPATIENT
Start: 2022-11-07 | End: 2022-11-07 | Stop reason: HOSPADM

## 2022-11-07 RX ORDER — OXYCODONE HYDROCHLORIDE 5 MG/1
5 TABLET ORAL PRN
Status: COMPLETED | OUTPATIENT
Start: 2022-11-07 | End: 2022-11-07

## 2022-11-07 RX ORDER — HYDRALAZINE HYDROCHLORIDE 20 MG/ML
5 INJECTION INTRAMUSCULAR; INTRAVENOUS
Status: DISCONTINUED | OUTPATIENT
Start: 2022-11-07 | End: 2022-11-07 | Stop reason: HOSPADM

## 2022-11-07 RX ORDER — LIDOCAINE HYDROCHLORIDE 10 MG/ML
0.3 INJECTION, SOLUTION EPIDURAL; INFILTRATION; INTRACAUDAL; PERINEURAL
Status: DISCONTINUED | OUTPATIENT
Start: 2022-11-07 | End: 2022-11-07 | Stop reason: HOSPADM

## 2022-11-07 RX ORDER — DEXAMETHASONE SODIUM PHOSPHATE 4 MG/ML
INJECTION, SOLUTION INTRA-ARTICULAR; INTRALESIONAL; INTRAMUSCULAR; INTRAVENOUS; SOFT TISSUE PRN
Status: DISCONTINUED | OUTPATIENT
Start: 2022-11-07 | End: 2022-11-07 | Stop reason: SDUPTHER

## 2022-11-07 RX ORDER — SODIUM CHLORIDE 0.9 % (FLUSH) 0.9 %
5-40 SYRINGE (ML) INJECTION EVERY 12 HOURS SCHEDULED
Status: DISCONTINUED | OUTPATIENT
Start: 2022-11-07 | End: 2022-11-07 | Stop reason: HOSPADM

## 2022-11-07 RX ORDER — DIPHENHYDRAMINE HYDROCHLORIDE 50 MG/ML
12.5 INJECTION INTRAMUSCULAR; INTRAVENOUS
Status: DISCONTINUED | OUTPATIENT
Start: 2022-11-07 | End: 2022-11-07 | Stop reason: HOSPADM

## 2022-11-07 RX ORDER — HEPARIN SODIUM 5000 [USP'U]/ML
5000 INJECTION, SOLUTION INTRAVENOUS; SUBCUTANEOUS ONCE
Status: COMPLETED | OUTPATIENT
Start: 2022-11-07 | End: 2022-11-07

## 2022-11-07 RX ORDER — SODIUM CHLORIDE, SODIUM LACTATE, POTASSIUM CHLORIDE, CALCIUM CHLORIDE 600; 310; 30; 20 MG/100ML; MG/100ML; MG/100ML; MG/100ML
INJECTION, SOLUTION INTRAVENOUS CONTINUOUS
Status: DISCONTINUED | OUTPATIENT
Start: 2022-11-07 | End: 2022-11-07 | Stop reason: HOSPADM

## 2022-11-07 RX ORDER — SODIUM CHLORIDE, SODIUM LACTATE, POTASSIUM CHLORIDE, AND CALCIUM CHLORIDE .6; .31; .03; .02 G/100ML; G/100ML; G/100ML; G/100ML
IRRIGANT IRRIGATION PRN
Status: DISCONTINUED | OUTPATIENT
Start: 2022-11-07 | End: 2022-11-07 | Stop reason: ALTCHOICE

## 2022-11-07 RX ORDER — OXYCODONE HYDROCHLORIDE 5 MG/1
10 TABLET ORAL PRN
Status: COMPLETED | OUTPATIENT
Start: 2022-11-07 | End: 2022-11-07

## 2022-11-07 RX ORDER — MEPERIDINE HYDROCHLORIDE 25 MG/ML
12.5 INJECTION INTRAMUSCULAR; INTRAVENOUS; SUBCUTANEOUS EVERY 5 MIN PRN
Status: DISCONTINUED | OUTPATIENT
Start: 2022-11-07 | End: 2022-11-07 | Stop reason: HOSPADM

## 2022-11-07 RX ORDER — ONDANSETRON 2 MG/ML
4 INJECTION INTRAMUSCULAR; INTRAVENOUS EVERY 10 MIN PRN
Status: DISCONTINUED | OUTPATIENT
Start: 2022-11-07 | End: 2022-11-07 | Stop reason: HOSPADM

## 2022-11-07 RX ADMIN — MIDAZOLAM 2 MG: 1 INJECTION INTRAMUSCULAR; INTRAVENOUS at 07:30

## 2022-11-07 RX ADMIN — ROCURONIUM BROMIDE 55 MG: 10 INJECTION, SOLUTION INTRAVENOUS at 07:35

## 2022-11-07 RX ADMIN — HEPARIN SODIUM 5000 UNITS: 5000 INJECTION INTRAVENOUS; SUBCUTANEOUS at 06:35

## 2022-11-07 RX ADMIN — FENTANYL CITRATE 50 MCG: 50 INJECTION INTRAMUSCULAR; INTRAVENOUS at 07:43

## 2022-11-07 RX ADMIN — DEXAMETHASONE SODIUM PHOSPHATE 8 MG: 4 INJECTION, SOLUTION INTRAMUSCULAR; INTRAVENOUS at 07:44

## 2022-11-07 RX ADMIN — LABETALOL HYDROCHLORIDE 10 MG: 5 INJECTION, SOLUTION INTRAVENOUS at 07:46

## 2022-11-07 RX ADMIN — ONDANSETRON HYDROCHLORIDE 4 MG: 2 INJECTION, SOLUTION INTRAMUSCULAR; INTRAVENOUS at 07:44

## 2022-11-07 RX ADMIN — ONDANSETRON HYDROCHLORIDE 4 MG: 2 INJECTION, SOLUTION INTRAMUSCULAR; INTRAVENOUS at 09:54

## 2022-11-07 RX ADMIN — HYDRALAZINE HYDROCHLORIDE 5 MG: 20 INJECTION INTRAMUSCULAR; INTRAVENOUS at 08:02

## 2022-11-07 RX ADMIN — MEPERIDINE HYDROCHLORIDE 12.5 MG: 25 INJECTION, SOLUTION INTRAMUSCULAR; INTRAVENOUS; SUBCUTANEOUS at 09:24

## 2022-11-07 RX ADMIN — OXYCODONE 10 MG: 5 TABLET ORAL at 09:03

## 2022-11-07 RX ADMIN — SUGAMMADEX 300 MG: 100 INJECTION, SOLUTION INTRAVENOUS at 08:16

## 2022-11-07 RX ADMIN — HYDRALAZINE HYDROCHLORIDE 5 MG: 20 INJECTION INTRAMUSCULAR; INTRAVENOUS at 07:57

## 2022-11-07 RX ADMIN — CEFAZOLIN 2000 MG: 2 INJECTION, POWDER, FOR SOLUTION INTRAMUSCULAR; INTRAVENOUS at 07:25

## 2022-11-07 RX ADMIN — SODIUM CHLORIDE, POTASSIUM CHLORIDE, SODIUM LACTATE AND CALCIUM CHLORIDE: 600; 310; 30; 20 INJECTION, SOLUTION INTRAVENOUS at 06:36

## 2022-11-07 RX ADMIN — FENTANYL CITRATE 50 MCG: 50 INJECTION INTRAMUSCULAR; INTRAVENOUS at 07:38

## 2022-11-07 RX ADMIN — PROPOFOL 40 MG: 10 INJECTION, EMULSION INTRAVENOUS at 07:44

## 2022-11-07 RX ADMIN — PROPOFOL 260 MG: 10 INJECTION, EMULSION INTRAVENOUS at 07:35

## 2022-11-07 RX ADMIN — KETOROLAC TROMETHAMINE 45 MG: 30 INJECTION, SOLUTION INTRAMUSCULAR at 08:30

## 2022-11-07 RX ADMIN — SODIUM CHLORIDE, POTASSIUM CHLORIDE, SODIUM LACTATE AND CALCIUM CHLORIDE: 600; 310; 30; 20 INJECTION, SOLUTION INTRAVENOUS at 08:19

## 2022-11-07 RX ADMIN — ONDANSETRON HYDROCHLORIDE 4 MG: 2 INJECTION, SOLUTION INTRAMUSCULAR; INTRAVENOUS at 08:16

## 2022-11-07 RX ADMIN — HYDROMORPHONE HYDROCHLORIDE 0.5 MG: 1 INJECTION, SOLUTION INTRAMUSCULAR; INTRAVENOUS; SUBCUTANEOUS at 08:37

## 2022-11-07 ASSESSMENT — PAIN - FUNCTIONAL ASSESSMENT
PAIN_FUNCTIONAL_ASSESSMENT: PREVENTS OR INTERFERES WITH MANY ACTIVE NOT PASSIVE ACTIVITIES
PAIN_FUNCTIONAL_ASSESSMENT: NONE - DENIES PAIN
PAIN_FUNCTIONAL_ASSESSMENT: PREVENTS OR INTERFERES WITH MANY ACTIVE NOT PASSIVE ACTIVITIES
PAIN_FUNCTIONAL_ASSESSMENT: PREVENTS OR INTERFERES SOME ACTIVE ACTIVITIES AND ADLS

## 2022-11-07 ASSESSMENT — PAIN DESCRIPTION - DESCRIPTORS
DESCRIPTORS: SHARP

## 2022-11-07 ASSESSMENT — ENCOUNTER SYMPTOMS: SHORTNESS OF BREATH: 1

## 2022-11-07 ASSESSMENT — PAIN DESCRIPTION - LOCATION
LOCATION: ABDOMEN

## 2022-11-07 ASSESSMENT — PAIN SCALES - GENERAL
PAINLEVEL_OUTOF10: 6
PAINLEVEL_OUTOF10: 8
PAINLEVEL_OUTOF10: 5

## 2022-11-07 ASSESSMENT — PAIN DESCRIPTION - ORIENTATION
ORIENTATION: MID

## 2022-11-07 NOTE — ANESTHESIA POSTPROCEDURE EVALUATION
Department of Anesthesiology  Postprocedure Note    Patient: Medhat Sumner  MRN: [id number]  YOB: 1975  Date of evaluation: 11/7/2022      Procedure Summary     Date: 11/07/22 Room / Location: [place] [id number] / [facility]    Anesthesia Start: 0730 Anesthesia Stop: 0830    Procedure: LAPAROSCOPIC CHOLECYSTECTOMY (Abdomen) Diagnosis:       Chronic calculous cholecystitis      (Chronic calculous cholecystitis [K80.10])    Surgeons: Aubrey Horowitz MD Responsible Provider: Mo Elder MD    Anesthesia Type: General ASA Status: 3          Anesthesia Type: General    Jasmina Phase I: Jasmina Score: 7    Jasmina Phase II: Jasmina Score: 9      Anesthesia Post Evaluation    Patient location during evaluation: PACU  Level of consciousness: awake  Airway patency: patent  Nausea & Vomiting: no nausea  Complications: no  Cardiovascular status: blood pressure returned to baseline  Respiratory status: acceptable  Hydration status: euvolemic

## 2022-11-07 NOTE — ANESTHESIA PRE PROCEDURE
Department of Anesthesiology  Preprocedure Note       Name:  John Bowles   Age:  50 y.o.  :  1975                                          MRN:  [id number]         Date:  2022      Surgeon: Kelsey Cardona):  Vasyl Maxwell MD    Procedure: Procedure(s):  LAPAROSCOPIC CHOLECYSTECTOMY, POSSIBLE CHOLANGIOGRAMS, POSSIBLE CONVENTIONAL CHOLECYSTECTOMY    Medications prior to admission:   Prior to Admission medications    Medication Sig Start Date End Date Taking? Authorizing Provider   ondansetron (ZOFRAN) 8 MG tablet Take 1 tablet by mouth every 8 hours as needed for Nausea 10/18/22   Carol Mon MD       Current medications:    Current Facility-Administered Medications   Medication Dose Route Frequency Provider Last Rate Last Admin    lidocaine PF 1 % injection 0.3 mL  0.3 mL IntraDERmal Once PRN Reyes Paganini, MD        lactated ringers infusion   IntraVENous Continuous Reyes Paganini, MD        sodium chloride flush 0.9 % injection 5-40 mL  5-40 mL IntraVENous 2 times per day Reyes Paganini, MD        sodium chloride flush 0.9 % injection 5-40 mL  5-40 mL IntraVENous PRN Reyes Paganini, MD        0.9 % sodium chloride infusion   IntraVENous PRN Reyes Paganini, MD        heparin (porcine) injection 5,000 Units  5,000 Units SubCUTAneous Once Vasyl Maxwell MD        ceFAZolin (ANCEF) 2,000 mg in sodium chloride 0.9 % 50 mL IVPB (mini-bag)  2,000 mg IntraVENous Once Vasyl Maxwell MD           Allergies:     Allergies   Allergen Reactions    Lisinopril Angioedema    Tioconazole      Other reaction(s): vag burning, swelling, abd. pain       Problem List:    Patient Active Problem List   Diagnosis Code    Chest pain R07.9    Abnormal uterine bleeding (AUB) N93.9    Iron deficiency anemia due to chronic blood loss D50.0    Fibroid uterus D25.9    Pneumonia J18.9    Hypertension I10    Dyspnea on exertion R06.09    Fatigue R53.83    Abnormal EKG R94.31    CCC (chronic calculous cholecystitis) K80.10       Past Medical History:        Diagnosis Date    Chest pain 11/14/2013    Hypertension     Pneumonia     Prolonged emergence from general anesthesia        Past Surgical History:        Procedure Laterality Date    ANTERIOR CRUCIATE LIGAMENT REPAIR Left     CARPAL TUNNEL RELEASE Left 07/2020    DILATION AND CURETTAGE OF UTERUS N/A 8/27/2020    VIDEO HYSTEROSCOPY DILATATION AND CURETTAGE, [place] ABLATION, MYOSURE performed by Franchesca Swan MD at [address] [place] [address] HYSTEROSCOPY  1/23/15    polypectomy, D&C myosure    HYSTEROSCOPY  04/04/2018    KNEE ARTHROSCOPY Left     X 2    WRIST GANGLION EXCISION Left        Social History:    Social History     Tobacco Use    Smoking status: Never    Smokeless tobacco: Never   Substance Use Topics    Alcohol use: No                                Counseling given: Not Answered      Vital Signs (Current):   Vitals:    11/02/22 1123   Weight: 193 lb (87.5 kg)   Height: 5' 5\" (1.651 m)                                              BP Readings from Last 3 Encounters:   10/24/22 (!) 160/98   10/18/22 (!) 148/88   07/05/22 138/88       NPO Status:                                                                                 BMI:   Wt Readings from Last 3 Encounters:   11/02/22 193 lb (87.5 kg)   10/24/22 191 lb 3.2 oz (86.7 kg)   10/18/22 199 lb (90.3 kg)     Body mass index is 32.12 kg/m².     CBC:   Lab Results   Component Value Date/Time    WBC 7.1 10/18/2022 12:10 PM    RBC 4.69 10/18/2022 12:10 PM    HGB 14.8 10/18/2022 12:10 PM    HCT 41.4 10/18/2022 12:10 PM    MCV 88.2 10/18/2022 12:10 PM    RDW 13.0 10/18/2022 12:10 PM     10/18/2022 12:10 PM       CMP:   Lab Results   Component Value Date/Time     10/18/2022 12:10 PM    K 3.7 10/18/2022 12:10 PM     10/18/2022 12:10 PM    CO2 29 10/18/2022 12:10 PM    BUN 13 10/18/2022 12:10 PM    CREATININE 0.6 10/18/2022 12:10 PM    GFRAA >60 09/13/2021 04:00 PM    AGRATIO 1.8 10/18/2022 12:10 PM    LABGLOM >60 10/18/2022 12:10 PM    GLUCOSE 105 10/18/2022 12:10 PM    PROT 6.6 10/18/2022 12:10 PM    CALCIUM 9.9 10/18/2022 12:10 PM    BILITOT 0.6 10/18/2022 12:10 PM    ALKPHOS 102 10/18/2022 12:10 PM    AST 60 10/18/2022 12:10 PM    ALT 43 10/18/2022 12:10 PM       POC Tests: No results for input(s): POCGLU, POCNA, POCK, POCCL, POCBUN, POCHEMO, POCHCT in the last 72 hours. Coags:   Lab Results   Component Value Date/Time    PROTIME 13.3 01/23/2018 01:30 PM    INR 1.18 01/23/2018 01:30 PM    APTT 32.3 01/23/2018 01:30 PM       HCG (If Applicable):   Lab Results   Component Value Date    PREGTESTUR Negative 10/18/2022        ABGs: No results found for: PHART, PO2ART, CGK1OUK, DYN8TNY, BEART, J1SYMUTL     Type & Screen (If Applicable):  No results found for: LABABO, LABRH    Drug/Infectious Status (If Applicable):  No results found for: HIV, HEPCAB    COVID-19 Screening (If Applicable):   Lab Results   Component Value Date/Time    COVID19 Negative 09/13/2021 04:00 PM    COVID19 NOT DETECTED 07/04/2021 08:25 PM    COVID19 Not Detected 07/04/2021 02:49 PM           Anesthesia Evaluation  Patient summary reviewed and Nursing notes reviewed no history of anesthetic complications:   Airway: Mallampati: III  TM distance: >3 FB   Neck ROM: full  Mouth opening: > = 3 FB   Dental: normal exam         Pulmonary:   (+) pneumonia:  shortness of breath:                             Cardiovascular:    (+) hypertension:, GONZALEZ:,                   Neuro/Psych:   Negative Neuro/Psych ROS              GI/Hepatic/Renal: Neg GI/Hepatic/Renal ROS       (-) GERD, liver disease and no renal disease       Endo/Other: Negative Endo/Other ROS       (-) diabetes mellitus               Abdominal:             Vascular: negative vascular ROS. Other Findings:           Anesthesia Plan      general     ASA 3     (I discussed with the patient the risks and benefits of PIV, general anesthesia, IV Narcotics, PACU.  All questions were answered the patient agrees with the plan)  Induction: intravenous. MIPS: Prophylactic antiemetics administered. Anesthetic plan and risks discussed with patient. Plan discussed with CRNA.                     Joan Kwon MD   11/7/2022

## 2022-11-07 NOTE — H&P
[facility]      The H&P was reviewed, the patient was examined, and no change has occurred in the patient's condition since the H&P was completed. The indications for the procedure were reviewed, and any questions were answered. I updated the progress note from 10/24/2022 which is the H&P.      Vitals:    11/07/22 0633   BP: (!) 154/94   Pulse: 68   Resp: 16   Temp: 97.7 °F (36.5 °C)   SpO2: 99%

## 2022-11-07 NOTE — PROGRESS NOTES
Discharge instructions given to pt and , voiced understanding, questions encouraged.  Outpatient pharmacy delivered pt's home prescription

## 2022-11-07 NOTE — DISCHARGE INSTRUCTIONS
[facility] SURGERY [facility] AND [facility]. Cordell Walsh M.D. [facility] [address] [facility] [address] [address]                [address] M[address] [place] [address]         [address] Natanael Arroyo M.D                         [phone]        [facility] Avni Childers M.D. [facility]       POST-OPERATIVE INSTRUCTIONS FOR GALLBLADDER SURGERY    Call the office to schedule your post-operative appointment with your surgeon for two (2) weeks. You will have either white steri-strips and a water occlusive dressing or surgical glue closing your incisions. If you have clear bandages over your incisions, you may remove them in 2 days. Leave the steri-stips in place. These will peel away in 7-10 days. You may shower in 2 days after removing your dressing. Wash incisions gently, and pat them dry. Do not rub your incisions. General guidelines for activity:   Avoid strenuous activity or lifting anything heavier than 15 pounds. It is OK to be up  walking around, and walking up and down stairs. Do what is comfortable: stop and rest when you feel tired. Drink plenty of fluids and stay on a bland diet for 2-3 days after surgery. Do NOT drive while taking your narcotic pain medicine. You may resume driving when you feel capable of responding to an urgent situation if needed and not taking prescription pain medication. Watch for signs of infection:  Excessive warmth or bright redness around your incisions  Leakage of bloody or cloudy fluid from you incisions  Fever over 100.5  During the laparoscopic procedure that you had, gas is pumped into the abdominal cavity. You may feel abdominal, shoulder, or rib pain for a few days due to this gas.     You will have pain medicine ordered. Take as directed    If you experience constipation:  Increase your water intake  Increase your activity, walking is best.  A stool softener or mild laxative may be necessary if you still have not had a bowel movement ; call the office for further instructions.

## 2022-11-07 NOTE — PROGRESS NOTES
[facility]    HPI:  Patient is 50y.o. year old female seen at request of SHANTA Campoverde CNP. She reports pain in right upper quadrant and midepigastric region. It is pressure-like and shooting. It is described as severe. Other associated symptoms are bloating/abdominal distension and nausea. These symptoms have been present for  weeks . They are  made worse by eating. The pain does radiate to the back. Past Medical History:   Diagnosis Date    Chest pain 11/14/2013    Hypertension     Pneumonia     Prolonged emergence from general anesthesia        Past Surgical History:   Procedure Laterality Date    ANTERIOR CRUCIATE LIGAMENT REPAIR Left     CARPAL TUNNEL RELEASE Left 07/2020    DILATION AND CURETTAGE OF UTERUS N/A 8/27/2020    VIDEO HYSTEROSCOPY DILATATION AND CURETTAGE, [place] ABLATION, MYOSURE performed by Veda Stevenson MD at [address] [place] [address]  1/23/15    polypectomy, D&C myosure    HYSTEROSCOPY  04/04/2018    KNEE ARTHROSCOPY Left     X 2    WRIST GANGLION EXCISION Left        Current Outpatient Medications on File Prior to Visit   Medication Sig Dispense Refill    ondansetron (ZOFRAN) 8 MG tablet Take 1 tablet by mouth every 8 hours as needed for Nausea 10 tablet 0     No current facility-administered medications on file prior to visit.        Allergies   Allergen Reactions    Lisinopril Angioedema    Tioconazole      Other reaction(s): vag burning, swelling, abd. pain       Social History     Socioeconomic History    Marital status:      Spouse name: Not on file    Number of children: Not on file    Years of education: Not on file    Highest education level: Not on file   Occupational History    Not on file   Tobacco Use    Smoking status: Never    Smokeless tobacco: Never   Vaping Use    Vaping Use: Never used   Substance and Sexual Activity    Alcohol use: No    Drug use: No    Sexual activity: Not on file   Other Topics Concern    Not on file Social History Narrative    Not on file     Social Determinants of Health     Financial Resource Strain: Not on file   Food Insecurity: Not on file   Transportation Needs: Not on file   Physical Activity: Not on file   Stress: Not on file   Social Connections: Not on file   Intimate Partner Violence: Not on file   Housing Stability: Not on file       Family History   Problem Relation Age of Onset    High Blood Pressure Mother     Diabetes Father     Heart Attack Father     High Blood Pressure Father        ROS:  She reports no complaints related to the eyes, ears , nose throat or mouth. She denies weight loss. No chest pain. No SOB. No urinary complaints. No musculoskeletal complaints. No skin rashes. No neurologic deficits. No bleeding tendencies. GI complaints include RUQ pain. Physical Exam:  Vitals:    10/24/22 1540   BP: (!) 160/98   Pulse: 73   191#  General:  Comfortable. No distress. Eyes:  No scleral icterus  Ears:  Normal  Nose:  Normal  Mouth:  Mucous membranes moist  Respiratory: Lungs CTA. No accessory muscle use. Heart:  Regular rhythm  Abdomen:  Soft. Non distended. Tender RUQ. Musculoskeletal:  No abnormal movements. ROM extremities normal.  Skin:  No rashes. Neurologic:  No focal deficits. Psychiatric:  AAA. O x 3.    Radiographic studies:  GBUS with stones. Laboratory Studies:   Lab Results   Component Value Date/Time    BILITOT 0.6 10/18/2022 12:10 PM    ALKPHOS 102 10/18/2022 12:10 PM    AST 60 10/18/2022 12:10 PM    ALT 43 10/18/2022 12:10 PM    LABALBU 4.2 10/18/2022 12:10 PM       ASSESSMENT:  1. Chronic calculous cholecystitis            PLAN:  The diagnosis and recommended procedure were explained. Questions answered. Prepare for gallbladder surgery.      Jose David Nava MD

## 2022-11-07 NOTE — BRIEF OP NOTE
Brief Postoperative Note      Patient: Franki Carson  YOB: 1975  MRN: [id number]    Date of Procedure: 11/7/2022    Pre-Op Diagnosis: Chronic calculous cholecystitis [K80.10]    Post-Op Diagnosis: Same       Procedure(s):  LAPAROSCOPIC CHOLECYSTECTOMY    Surgeon(s):  Hardy Astorga MD    Assistant:  Surgical Assistant: Pham Leigh    Anesthesia: General    Estimated Blood Loss (mL): Minimal    Complications: None    Specimens:   ID Type Source Tests Collected by Time Destination   A : gallbladder and contents Tissue Tissue SURGICAL PATHOLOGY Hardy Astorga MD 11/7/2022 4218        Implants:  * No implants in log *      Drains:   NG/OG/NJ/NE Tube Orogastric 18 fr Center mouth (Active)       Findings: As above    Electronically signed by Eliu Zamora MD on 11/7/2022 at 8:19 AM

## 2022-11-10 NOTE — OP NOTE
the  epigastric incision. I reinspected the right upper quadrant. I  copiously irrigated the area. I suctioned out the irrigant. There was  no evident bleeding, bile leak, or complication. I deflated the abdomen  and removed the trocars. The fascia at the epigastric port site was  reapproximated with 0 Vicryl suture. Local anesthetic was infiltrated. 4-0 Vicryl was used to reapproximate the skin at all the incisions. Benzoin, Steri-Strip dressing were placed. DISPOSITION:  The patient tolerated the procedure without any acute  complication. Mary Goncalves MD    D: 11/10/2022 7:39:10       T: 11/10/2022 7:43:12     [id number]  Job#: [id number]     Doc#: [id number]    CC:   Torsten Butler CNP

## 2022-11-21 ENCOUNTER — OFFICE VISIT (OUTPATIENT)
Dept: SURGERY | Age: 47
End: 2022-11-21

## 2022-11-21 VITALS
HEIGHT: 65 IN | SYSTOLIC BLOOD PRESSURE: 122 MMHG | WEIGHT: 190 LBS | DIASTOLIC BLOOD PRESSURE: 74 MMHG | BODY MASS INDEX: 31.65 KG/M2

## 2022-11-21 DIAGNOSIS — Z09 POSTOP CHECK: Primary | ICD-10-CM

## 2022-11-21 PROCEDURE — 99024 POSTOP FOLLOW-UP VISIT: CPT | Performed by: NURSE PRACTITIONER

## 2022-11-21 NOTE — PROGRESS NOTES
Pt is s/p mike wood with Dr. Rufino Villalobos on 11/7/22. She presents today for a post-op evaluation. ABD: soft, no tenderness, lap incisions look good, she denies N/V and his having frequent Bms. Plan: f/u as needed. Pt looks great post-op.

## 2023-01-12 ENCOUNTER — OFFICE VISIT (OUTPATIENT)
Dept: ENT CLINIC | Age: 48
End: 2023-01-12
Payer: COMMERCIAL

## 2023-01-12 ENCOUNTER — PROCEDURE VISIT (OUTPATIENT)
Dept: AUDIOLOGY | Age: 48
End: 2023-01-12
Payer: COMMERCIAL

## 2023-01-12 VITALS
OXYGEN SATURATION: 100 % | DIASTOLIC BLOOD PRESSURE: 106 MMHG | BODY MASS INDEX: 31.65 KG/M2 | SYSTOLIC BLOOD PRESSURE: 183 MMHG | HEIGHT: 65 IN | WEIGHT: 190 LBS | HEART RATE: 78 BPM | TEMPERATURE: 97.8 F

## 2023-01-12 DIAGNOSIS — M54.2 NECK PAIN: ICD-10-CM

## 2023-01-12 DIAGNOSIS — R42 DIZZINESS: ICD-10-CM

## 2023-01-12 DIAGNOSIS — R42 DIZZINESS: Primary | ICD-10-CM

## 2023-01-12 DIAGNOSIS — H93.8X1 SENSATION OF PRESSURE IN EAR, RIGHT: Primary | ICD-10-CM

## 2023-01-12 PROCEDURE — 92557 COMPREHENSIVE HEARING TEST: CPT | Performed by: AUDIOLOGIST

## 2023-01-12 PROCEDURE — 92567 TYMPANOMETRY: CPT | Performed by: AUDIOLOGIST

## 2023-01-12 PROCEDURE — 99203 OFFICE O/P NEW LOW 30 MIN: CPT | Performed by: OTOLARYNGOLOGY

## 2023-01-12 PROCEDURE — 3080F DIAST BP >= 90 MM HG: CPT | Performed by: OTOLARYNGOLOGY

## 2023-01-12 PROCEDURE — 3077F SYST BP >= 140 MM HG: CPT | Performed by: OTOLARYNGOLOGY

## 2023-01-12 RX ORDER — IBUPROFEN 600 MG/1
600 TABLET ORAL 4 TIMES DAILY PRN
COMMUNITY
Start: 2021-01-24

## 2023-01-12 ASSESSMENT — ENCOUNTER SYMPTOMS
TROUBLE SWALLOWING: 0
SHORTNESS OF BREATH: 0
EYE ITCHING: 0
COUGH: 0
SINUS PRESSURE: 0
FACIAL SWELLING: 0
APNEA: 0
SORE THROAT: 0
VOICE CHANGE: 0

## 2023-01-12 NOTE — PATIENT INSTRUCTIONS
Good Communication Strategies    Communication can be challenging for anyone, but can be especially difficult for those with some degree of hearing loss. While we may not be able to control every factor that may lead to difficulty with communication, there are Good Communication Strategies that we can all use in our day-to-day lives. Communication takes both parties working together for it to be successful. Tips as a Listener:   Control your environment. It is important to limit the amount of background noise in the room when possible. You should also consider having a good light source in the room to best see the other person. Ask for clarification. Instead of saying \"What?\", you can use parts of what you heard to make a new question. For example, if you heard the word \"Thursday\" but not the rest of the week, you may ask \"What was that about Thursday? \" or \"What did you want to do Thursday? \". This shows the person talking that you are listening and will help them better explain what they are saying. Be an advocate for yourself. If you are hearing but not understanding, tell the other person \"I can hear you, but I need you to slow down when you speak. \"  Or if someone is facing the other direction, say \"I cannot hear you when you are not looking at me when we talk. \"       Tips as a Talker:   - Sit or stand 3 to 6 feet away to maximize audibility         -- It is unrealistic to believe someone else will fully hear your message if you are speaking from across the room or in a different room in the house   - Stay at eye level to help with visual cues   - Make sure you have the persons attention before speaking   - Use facial expressions and gestures to accentuate your message   - Raise your voice slightly (do not scream)   - Speak slowly and distinctly   - Use short, simple sentences   - Rephrase your words if the person is having a hard time understanding you    - To avoid distortion, dont speak directly into a persons ear      Some additional items that may be helpful:   - Remain patient - this is important for both parties   - Write down items that still cannot be heard/understood. You may write with pen/paper or consider typing/texting on a cell phone or smart device. - If background noise is unavoidable, try to keep yourself in a good position in the room. By sitting at a smith on the side of the restaurant (preferably a corner), it will be easier to communicate than if you were sitting at a table in the middle with background noise surrounding you. Keep yourself positioned away from music speakers or heavy foot traffic. Dizziness: Care Instructions  Your Care Instructions  Dizziness is the feeling of unsteadiness or fuzziness in your head. It is different than having vertigo, which is a feeling that the room is spinning or that you are moving or falling. It is also different from lightheadedness, which is the feeling that you are about to faint. It can be hard to know what causes dizziness. Some people feel dizzy when they have migraine headaches. Sometimes bouts of flu can make you feel dizzy. Some medical conditions, such as heart problems or high blood pressure, can make you feel dizzy. Many medicines can cause dizziness, including medicines for high blood pressure, pain, or anxiety. If a medicine causes your symptoms, your doctor may recommend that you stop or change the medicine. If it is a problem with your heart, you may need medicine to help your heart work better. If there is no clear reason for your symptoms, your doctor may suggest watching and waiting for a while to see if the dizziness goes away on its own. Follow-up care is a key part of your treatment and safety. Be sure to make and go to all appointments, and call your doctor if you are having problems. It's also a good idea to know your test results and keep a list of the medicines you take.     How can you care for yourself at home? If your doctor recommends or prescribes medicine, take it exactly as directed. Call your doctor if you think you are having a problem with your medicine. Do not drive while you feel dizzy. Try to prevent falls. Steps you can take include:  Using nonskid mats, adding grab bars near the tub, and using night-lights. Clearing your home so that walkways are free of anything you might trip on. Letting family and friends know that you have been feeling dizzy. This will help them know how to help you. When should you call for help? Call 911 anytime you think you may need emergency care. For example, call if:    You passed out (lost consciousness). You have dizziness along with symptoms of a heart attack. These may include:  Chest pain or pressure, or a strange feeling in the chest.  Sweating. Shortness of breath. Nausea or vomiting. Pain, pressure, or a strange feeling in the back, neck, jaw, or upper belly or in one or both shoulders or arms. Lightheadedness or sudden weakness. A fast or irregular heartbeat. You have symptoms of a stroke. These may include:  Sudden numbness, tingling, weakness, or loss of movement in your face, arm, or leg, especially on only one side of your body. Sudden vision changes. Sudden trouble speaking. Sudden confusion or trouble understanding simple statements. Sudden problems with walking or balance. A sudden, severe headache that is different from past headaches. Call your doctor now or seek immediate medical care if:    You feel dizzy and have a fever, headache, or ringing in your ears. You have new or increased nausea and vomiting. Your dizziness does not go away or comes back. Watch closely for changes in your health, and be sure to contact your doctor if:    You do not get better as expected. Where can you learn more? Go to [email or web address] org and sign in to your [employer] account.  Enter V377 in the Search Health Information box to learn more about \"Dizziness: Care Instructions. \"     If you do not have an account, please click on the \"Sign Up Now\" link. Current as of: September 23, 2018  Content Version: 11.9  © 9911-1001 [employer]. Care instructions adapted under license by [facility] ([facility]). If you have questions about a medical condition or this instruction, always ask your healthcare professional. [address] any warranty or liability for your use of this information.

## 2023-01-12 NOTE — PROGRESS NOTES
[facility], [address], [address]  [place], [place]  P: [phone]       Patient     Jesus Raymundo  1975    ChiefComplaint     Chief Complaint   Patient presents with    New Patient     Patient is here today for soreness in throat for over a year now. Patient states the left side of her neck/throat is sore to the touch every now and then. Patient denies and pain or trouble swallowing. Dizziness     Patient is here today for dizziness while sitting and laying down. Patient states for the past 2 months she has been having dizzy spells occasionally while sitting or when she lays on her back. She states the room spins and it makes her nauseas. Patient denies any ear problems. History of Present Illness     Arcelia Fuentes is a 49-year-old female here today for evaluation of intermittent anterior left neck pain as well as intermittent sensation of motion. Reports anterior neck pain has been intermittent several times per year last for several days at a time and then spontaneously resolves. Had CT of the chest performed in 2021 which demonstrated small subcentimeter thyroid nodules. Denies dysphagia, done aphasia. Symptoms have not progressed. Also reports sensation of motion intermittently when sitting in a chair and when lying in bed for the last 2 months, occurs 1-2 times per week. History of severe migraine states they have recently returned. Denies hearing loss, otalgia.     Past Medical History     Past Medical History:   Diagnosis Date    Chest pain 11/14/2013    Hypertension     Pneumonia     Prolonged emergence from general anesthesia        Past Surgical History     Past Surgical History:   Procedure Laterality Date    ANTERIOR CRUCIATE LIGAMENT REPAIR Left     CARPAL TUNNEL RELEASE Left 07/2020    CHOLECYSTECTOMY, LAPAROSCOPIC N/A 11/7/2022    LAPAROSCOPIC CHOLECYSTECTOMY performed by Constance Mueller MD at [place] [address] N/A 8/27/2020 VIDEO HYSTEROSCOPY DILATATION AND CURETTAGE, Oneda Butter performed by Carlos Manuel Huerta MD at [address]    HYSTEROSCOPY  1/23/15    polypectomy, D&C myosure    HYSTEROSCOPY  04/04/2018    KNEE ARTHROSCOPY Left     X 2    WRIST GANGLION EXCISION Left        Family History     Family History   Problem Relation Age of Onset    High Blood Pressure Mother     Diabetes Father     Heart Attack Father     High Blood Pressure Father        Social History     Social History     Tobacco Use    Smoking status: Never    Smokeless tobacco: Never   Vaping Use    Vaping Use: Never used   Substance Use Topics    Alcohol use: No    Drug use: No        Allergies     Allergies   Allergen Reactions    Lisinopril Angioedema    Tioconazole      Other reaction(s): vag burning, swelling, abd. pain       Medications     Current Outpatient Medications   Medication Sig Dispense Refill    ibuprofen (ADVIL;MOTRIN) 600 MG tablet Take 600 mg by mouth 4 times daily as needed      ondansetron (ZOFRAN) 8 MG tablet Take 1 tablet by mouth every 8 hours as needed for Nausea (Patient not taking: Reported on 1/12/2023) 10 tablet 0     No current facility-administered medications for this visit. Review of Systems     Review of Systems   Constitutional:  Negative for appetite change, chills, fatigue, fever and unexpected weight change. HENT:  Negative for congestion, ear discharge, ear pain, facial swelling, hearing loss, nosebleeds, postnasal drip, sinus pressure, sneezing, sore throat, tinnitus, trouble swallowing and voice change. Eyes:  Negative for itching. Respiratory:  Negative for apnea, cough and shortness of breath. Endocrine: Negative for cold intolerance and heat intolerance. Musculoskeletal:  Positive for neck pain. Negative for myalgias. Skin:  Negative for rash. Allergic/Immunologic: Negative for environmental allergies. Neurological:  Positive for dizziness and headaches.    Psychiatric/Behavioral: Negative for confusion, decreased concentration and sleep disturbance. PhysicalExam     Vitals:    01/12/23 1131   BP: (!) 183/106   Site: Left Upper Arm   Position: Sitting   Pulse: 78   Temp: 97.8 °F (36.6 °C)   TempSrc: Infrared   SpO2: 100%   Weight: 190 lb (86.2 kg)   Height: 5' 5\" (1.651 m)       Physical Exam  Constitutional:       General: She is not in acute distress. Appearance: She is well-developed. HENT:      Head: Normocephalic and atraumatic. Right Ear: Tympanic membrane, ear canal and external ear normal. No drainage. No middle ear effusion. Tympanic membrane is not bulging. Tympanic membrane has normal mobility. Left Ear: Tympanic membrane, ear canal and external ear normal. No drainage. No middle ear effusion. Tympanic membrane is not bulging. Tympanic membrane has normal mobility. Nose: No mucosal edema or rhinorrhea. Mouth/Throat:      Lips: Pink. Mouth: Mucous membranes are moist.      Tongue: No lesions. Palate: No mass. Pharynx: Uvula midline. Eyes:      Pupils: Pupils are equal, round, and reactive to light. Neck:      Thyroid: No thyroid mass or thyromegaly. Trachea: Trachea and phonation normal.   Cardiovascular:      Pulses: Normal pulses. Pulmonary:      Effort: Pulmonary effort is normal. No accessory muscle usage or respiratory distress. Breath sounds: No stridor. Musculoskeletal:      Cervical back: Full passive range of motion without pain. Lymphadenopathy:      Head:      Right side of head: No submental or submandibular adenopathy. Left side of head: No submental or submandibular adenopathy. Cervical: No cervical adenopathy. Right cervical: No superficial, deep or posterior cervical adenopathy. Left cervical: No superficial, deep or posterior cervical adenopathy. Skin:     General: Skin is warm and dry. Neurological:      Mental Status: She is alert and oriented to person, place, and time. Cranial Nerves: No cranial nerve deficit. Coordination: Coordination normal.      Gait: Gait normal.      Comments: Negative [place]   Psychiatric:         Thought Content: Thought content normal.           Assessment and Plan     1. Dizziness  -Intermittent for 2 months has sensation of the world shifting when sitting upright and intermittently when laying down  -Positional testing negative  -Discussed likely connection to underlying migraines if they have recently returned recommend starting vitamin B2 and magnesium  - [facility]    2. Neck pain  -Intermittent anterior neck pain localized superior to the thyroid cartilage overlying hyoid  -Most likely muscle tension  -Encouraged patient to monitor symptoms and return  They occur versus            Cj Velásquez DO  1/12/23      Portions of this note were dictated using Dragon.  There may be linguistic errors secondary to the use of this program.

## 2023-01-16 ENCOUNTER — APPOINTMENT (OUTPATIENT)
Dept: GENERAL RADIOLOGY | Age: 48
End: 2023-01-16
Payer: COMMERCIAL

## 2023-01-16 ENCOUNTER — HOSPITAL ENCOUNTER (EMERGENCY)
Age: 48
Discharge: HOME | End: 2023-01-16
Payer: COMMERCIAL

## 2023-01-16 VITALS
TEMPERATURE: 98.2 F | HEART RATE: 88 BPM | RESPIRATION RATE: 18 BRPM | SYSTOLIC BLOOD PRESSURE: 181 MMHG | OXYGEN SATURATION: 100 % | DIASTOLIC BLOOD PRESSURE: 96 MMHG

## 2023-01-16 DIAGNOSIS — S63.502A LEFT WRIST SPRAIN, INITIAL ENCOUNTER: ICD-10-CM
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
## 2023-01-16 DIAGNOSIS — S60.222A CONTUSION OF LEFT HAND, INITIAL ENCOUNTER: Primary | ICD-10-CM

## 2023-01-16 DIAGNOSIS — R03.0 ELEVATED BLOOD PRESSURE READING: ICD-10-CM

## 2023-01-16 PROCEDURE — 73110 X-RAY EXAM OF WRIST: CPT

## 2023-01-16 PROCEDURE — 73130 X-RAY EXAM OF HAND: CPT

## 2023-01-16 PROCEDURE — 99283 EMERGENCY DEPT VISIT LOW MDM: CPT

## 2023-01-16 PROCEDURE — 6370000000 HC RX 637 (ALT 250 FOR IP): Performed by: PHYSICIAN ASSISTANT

## 2023-01-16 RX ORDER — ONDANSETRON 4 MG/1
4 TABLET, ORALLY DISINTEGRATING ORAL ONCE
Status: COMPLETED | OUTPATIENT
Start: 2023-01-16 | End: 2023-01-16

## 2023-01-16 RX ORDER — ACETAMINOPHEN 500 MG
1000 TABLET ORAL ONCE
Status: COMPLETED | OUTPATIENT
Start: 2023-01-16 | End: 2023-01-16

## 2023-01-16 RX ADMIN — ONDANSETRON 4 MG: 4 TABLET, ORALLY DISINTEGRATING ORAL at 13:32

## 2023-01-16 RX ADMIN — ACETAMINOPHEN 1000 MG: 500 TABLET ORAL at 13:32

## 2023-01-16 NOTE — ED PROVIDER NOTES
[facility]        Pt Name: Gisselle Hernández  MRN: [id number]  [place] 1975  Date of evaluation: 1/16/2023  Provider: Erica Irene PA-C  PCP: SHANTA Newton CNP  Note Started: 1:27 PM EST 1/16/23      NHAN. I have evaluated this patient. My supervising physician was available for consultation. CHIEF COMPLAINT       Chief Complaint   Patient presents with    Hand Injury     Pt left hand pinned between cart and metal door while working today around 11am.       HISTORY OF PRESENT ILLNESS: 1 or more Elements     History From: Patient  Limitations to history : None    Gisselle Hernández is a 50 y.o. female who presents to the emergency department for evaluation of left hand and wrist injury that occurred at work today she was moving a heavy 3 shelf big cart with frozen dough on it and the cart moved and pinned her left hand and wrist up against the metal edge of the door. Patient has pain bruising and swelling to the dorsal aspect of her left hand and abrasion with tenderness and swelling at anterior wrist. Has numbness and tingling sensation left 3-5 fingers. No other injuries. Not on blood thinners. Took aleve prior to arrival.    Nursing Notes were all reviewed and agreed with or any disagreements were addressed in the HPI. REVIEW OF SYSTEMS :      Review of Systems    Positives and Pertinent negatives as per HPI.      SURGICAL HISTORY     Past Surgical History:   Procedure Laterality Date    ANTERIOR CRUCIATE LIGAMENT REPAIR Left     CARPAL TUNNEL RELEASE Left 07/2020    CHOLECYSTECTOMY, LAPAROSCOPIC N/A 11/7/2022    LAPAROSCOPIC CHOLECYSTECTOMY performed by Kathy Marcos MD at [facility] N/A 8/27/2020    VIDEO HYSTEROSCOPY DILATATION AND CURETTAGE, NOVASURE ABLATION, MYOSURE performed by Sofia Lara MD at [facility]    HYSTEROSCOPY  1/23/15    polypectomy, D&C myosure    HYSTEROSCOPY  04/04/2018 KNEE ARTHROSCOPY Left     X 2    WRIST GANGLION EXCISION Left        CURRENTMEDICATIONS       Previous Medications    No medications on file       ALLERGIES     Lisinopril and Tioconazole    FAMILYHISTORY       Family History   Problem Relation Age of Onset    High Blood Pressure Mother     Diabetes Father     Heart Attack Father     High Blood Pressure Father         SOCIAL HISTORY       Social History     Tobacco Use    Smoking status: Never    Smokeless tobacco: Never   Vaping Use    Vaping Use: Never used   Substance Use Topics    Alcohol use: No    Drug use: No       SCREENINGS        Trevor Coma Scale  Eye Opening: Spontaneous  Best Verbal Response: Oriented  Best Motor Response: Obeys commands  Trevor Coma Scale Score: 15                CIWA Assessment  BP: (!) 181/96  Heart Rate: 88           PHYSICAL EXAM  1 or more Elements     ED Triage Vitals [01/16/23 1319]   BP Temp Temp Source Heart Rate Resp SpO2 Height Weight   (!) 181/96 98.2 °F (36.8 °C) Oral 88 18 100 % -- --       Physical Exam  Vitals and nursing note reviewed. Constitutional:       Appearance: She is well-developed. She is not ill-appearing or toxic-appearing. HENT:      Head: Normocephalic and atraumatic. Cardiovascular:      Pulses: Normal pulses. Radial pulses are 2+ on the left side. Pulmonary:      Effort: Pulmonary effort is normal. No respiratory distress. Musculoskeletal:      Comments: Tenderness bruising moderate swelling with hematoma over dorsal aspect left hand in the area of third and fourth metacarpals, soft compartments, no open wound at this site, no deformity. There is a small superficial linear abrasion at midline dorsal left wrist.  Tingling at left 3 through fifth fingers she can feel my touch but is a little decreased.   Patient fully extending fingers but not able to make complete fist. She crosses her index and middle fingers and touches thumb to index finger and is abducting and adducting fingers. Flexing and extending her wrist but this causes discomfort. Radial and ulnar pulses 2+. Cap refill less than 2 seconds. Skin:     General: Skin is warm and dry. Capillary Refill: Capillary refill takes less than 2 seconds. Neurological:      Mental Status: She is alert and oriented to person, place, and time. GCS: GCS eye subscore is 4. GCS verbal subscore is 5. GCS motor subscore is 6. Motor: No abnormal muscle tone. Psychiatric:         Behavior: Behavior normal.           DIAGNOSTIC RESULTS   LABS:    Labs Reviewed - No data to display    When ordered only abnormal lab results are displayed. All other labs were within normal range or not returned as of this dictation. EKG: When ordered, EKG's are interpreted by the Emergency Department Physician in the absence of a cardiologist.  Please see their note for interpretation of EKG. RADIOLOGY:   Non-plain film images such as CT, Ultrasound and MRI are read by the radiologist. Plain radiographic images are visualized and preliminarily interpreted by the ED Provider with the below findings:    Preliminary x-ray interpretation by myself found  independently, in absence of radiologist (Final interpretation by radiologist to follow):    Left wrist 3V: No acute fracture. No dislocation. Left hand 3V:No acute fracture. No dislocation. Interpretation per the Radiologist below, if available at the time of this note:    XR WRIST LEFT (MIN 3 VIEWS)   Final Result   1. No acute abnormality. XR HAND LEFT (MIN 3 VIEWS)   Final Result   1. No acute abnormality. XR WRIST LEFT (MIN 3 VIEWS)    Result Date: 1/16/2023  EXAMINATION: THREE XRAY VIEWS OF THE LEFT HAND; 3 XRAY VIEWS OF THE LEFT WRIST 1/16/2023 1:28 pm COMPARISON: None.  HISTORY: ORDERING SYSTEM PROVIDED HISTORY: injury r/o fx TECHNOLOGIST PROVIDED HISTORY: Reason for exam:->injury r/o fx Reason for Exam: Work injury FINDINGS: There is no acute fracture or dislocation. The bones are normally mineralized. There are no bony destructive lesions. Mild atherosclerosis involves the 1st carpometacarpal joint. Soft tissue swelling involves the dorsal hand. 1. No acute abnormality. XR HAND LEFT (MIN 3 VIEWS)    Result Date: 1/16/2023  EXAMINATION: THREE XRAY VIEWS OF THE LEFT HAND; 3 XRAY VIEWS OF THE LEFT WRIST 1/16/2023 1:28 pm COMPARISON: None. HISTORY: ORDERING SYSTEM PROVIDED HISTORY: injury r/o fx TECHNOLOGIST PROVIDED HISTORY: Reason for exam:->injury r/o fx Reason for Exam: Work injury FINDINGS: There is no acute fracture or dislocation. The bones are normally mineralized. There are no bony destructive lesions. Mild atherosclerosis involves the 1st carpometacarpal joint. Soft tissue swelling involves the dorsal hand. 1. No acute abnormality. No results found. PROCEDURES   Unless otherwise noted below, none     Procedures    CRITICAL CARE TIME (.cctime)   0    PAST MEDICAL HISTORY      has a past medical history of Chest pain (11/14/2013), Hypertension, Pneumonia, and Prolonged emergence from general anesthesia. EMERGENCY DEPARTMENT COURSE and DIFFERENTIAL DIAGNOSIS/MDM:   Vitals:    Vitals:    01/16/23 1319   BP: (!) 181/96   Pulse: 88   Resp: 18   Temp: 98.2 °F (36.8 °C)   TempSrc: Oral   SpO2: 100%       Patient was given the following medications:  Medications   ondansetron (ZOFRAN-ODT) disintegrating tablet 4 mg (4 mg Oral Given 1/16/23 1332)   acetaminophen (TYLENOL) tablet 1,000 mg (1,000 mg Oral Given 1/16/23 1332)             Is this patient to be included in the SEP-1 Core Measure due to severe sepsis or septic shock? No   Exclusion criteria - the patient is NOT to be included for SEP-1 Core Measure due to: Infection is not suspected    Chronic Conditions affecting care:    has a past medical history of Chest pain (11/14/2013), Hypertension, Pneumonia, and Prolonged emergence from general anesthesia.     CONSULTS: (Who and What was discussed)  None          Records Reviewed (Source):     CC/HPI Summary, DDx, ED Course, and Reassessment:       Presented to the ER for evaluation of left hand pain and injury that occurred at work when a heavy cart had smashed her hand against a metal frame on the door. She has pain bruising swelling to the dorsal aspect of her left hand and wrist.  No deformity. She has numb tingly sensation of the left third fourth and fifth fingers but can feel my touch. She has soft compartments. Radian ulnar pulses 2+. Cap refill less than 2 seconds. X-rays obtained and reviewed of her left hand and wrist are negative for fracture. Limitations of plain film x-rays and need for follow-up if symptoms not improving she understands. Advised rest ice elevate continue Aleve and Tylenol as needed for pain and she was referred to [facility] [place] [place] for work-related injury. I wrote her a note for work for light duty/limited use of left hand for 1 week to allow time to rest and heal discussed if still having symptoms after 1 week she will need further evaluation she understands. I estimate there is LOW risk for COMPARTMENT SYNDROME or VASCULAR INJURY, thus I consider the discharge disposition reasonable. I am the Primary Clinician of Record. FINAL IMPRESSION      1. Contusion of left hand, initial encounter    2. Left wrist sprain, initial encounter    3. Work related injury    4. Elevated blood pressure reading          DISPOSITION/PLAN     DISPOSITION Decision to Discharge    PATIENT REFERRED TO:  *[facility] [address] [address] [facility] [address]  [phone]    In 1 week  for work related injuries    Rita Stinson, APRN - [address] [place] [address]  [phone]      for follow up of elevated blood pressure    [facility]  [address]    If symptoms worsen    DISCHARGE MEDICATIONS:  New Prescriptions    No medications on file       DISCONTINUED MEDICATIONS:  Discontinued Medications    IBUPROFEN (ADVIL;MOTRIN) 600 MG TABLET    Take 600 mg by mouth 4 times daily as needed    ONDANSETRON (ZOFRAN) 8 MG TABLET    Take 1 tablet by mouth every 8 hours as needed for Nausea              (Please note that portions of this note were completed with a voice recognition program.  Efforts were made to edit the dictations but occasionally words are mis-transcribed.)    Chiquis Cabrera PA-C (electronically signed)           Sukhwinder Smith PA-C  01/16/23 3202

## 2023-01-16 NOTE — Clinical Note
Mookie Fonseca was seen and treated in our emergency department on 1/16/2023. She may return to work on 01/17/2023. Please allow for light duty/ limited use of left hand for 1 week. If you have any questions or concerns, please don't hesitate to call.       Eduard Smith PA-C

## 2023-01-16 NOTE — DISCHARGE INSTRUCTIONS
Rest.  Ice. Elevate. Tylenol and Aleve as needed for pain. Your blood pressure is elevated and will need to be monitored. Have this rechecked at your doctor's office or make an nohemi w/ the doctor you were referred to on your discharge instructions to have your blood pressure rechecked sometime within the next 7 days.  Untreated elevated blood pressure puts you at risk for stroke, heart attack, kidney disease and other serious medical conditions

## 2023-06-17 ENCOUNTER — APPOINTMENT (OUTPATIENT)
Dept: CT IMAGING | Age: 48
End: 2023-06-17
Payer: COMMERCIAL

## 2023-06-17 ENCOUNTER — HOSPITAL ENCOUNTER (EMERGENCY)
Age: 48
Discharge: HOME | End: 2023-06-17
Payer: COMMERCIAL

## 2023-06-17 VITALS
BODY MASS INDEX: 31.65 KG/M2 | TEMPERATURE: 97.2 F | HEIGHT: 65 IN | SYSTOLIC BLOOD PRESSURE: 160 MMHG | HEART RATE: 78 BPM | DIASTOLIC BLOOD PRESSURE: 88 MMHG | RESPIRATION RATE: 16 BRPM | WEIGHT: 190 LBS | OXYGEN SATURATION: 99 %

## 2023-06-17 DIAGNOSIS — R03.0 ELEVATED BLOOD PRESSURE READING: ICD-10-CM

## 2023-06-17 DIAGNOSIS — S09.90XA CLOSED HEAD INJURY, INITIAL ENCOUNTER: ICD-10-CM
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
## 2023-06-17 DIAGNOSIS — E04.1 THYROID NODULE: ICD-10-CM

## 2023-06-17 PROCEDURE — 72125 CT NECK SPINE W/O DYE: CPT

## 2023-06-17 PROCEDURE — 72128 CT CHEST SPINE W/O DYE: CPT

## 2023-06-17 PROCEDURE — 70450 CT HEAD/BRAIN W/O DYE: CPT

## 2023-06-17 PROCEDURE — 99284 EMERGENCY DEPT VISIT MOD MDM: CPT

## 2023-06-17 PROCEDURE — 2500000003 HC RX 250 WO HCPCS: Performed by: REGISTERED NURSE

## 2023-06-17 PROCEDURE — 6370000000 HC RX 637 (ALT 250 FOR IP): Performed by: REGISTERED NURSE

## 2023-06-17 PROCEDURE — 72192 CT PELVIS W/O DYE: CPT

## 2023-06-17 PROCEDURE — 96372 THER/PROPH/DIAG INJ SC/IM: CPT

## 2023-06-17 PROCEDURE — 72131 CT LUMBAR SPINE W/O DYE: CPT

## 2023-06-17 RX ORDER — MORPHINE SULFATE 2 MG/ML
2 INJECTION, SOLUTION INTRAMUSCULAR; INTRAVENOUS ONCE
Status: COMPLETED | OUTPATIENT
Start: 2023-06-17 | End: 2023-06-17

## 2023-06-17 RX ORDER — MORPHINE SULFATE 2 MG/ML
2 INJECTION, SOLUTION INTRAMUSCULAR; INTRAVENOUS ONCE
Status: DISCONTINUED | OUTPATIENT
Start: 2023-06-17 | End: 2023-06-17

## 2023-06-17 RX ORDER — ONDANSETRON 4 MG/1
4 TABLET, ORALLY DISINTEGRATING ORAL ONCE
Status: COMPLETED | OUTPATIENT
Start: 2023-06-17 | End: 2023-06-17

## 2023-06-17 RX ADMIN — MORPHINE SULFATE 2 MG: 2 INJECTION, SOLUTION INTRAMUSCULAR; INTRAVENOUS at 16:06

## 2023-06-17 RX ADMIN — ONDANSETRON 4 MG: 4 TABLET, ORALLY DISINTEGRATING ORAL at 18:18

## 2023-06-17 ASSESSMENT — PAIN DESCRIPTION - LOCATION: LOCATION: HEAD

## 2023-06-17 ASSESSMENT — ENCOUNTER SYMPTOMS
EYE PAIN: 0
SORE THROAT: 0
COUGH: 0
PHOTOPHOBIA: 0
BACK PAIN: 1
VOMITING: 0
SHORTNESS OF BREATH: 0
CONSTIPATION: 0
CHEST TIGHTNESS: 0
ABDOMINAL PAIN: 0
COLOR CHANGE: 0
NAUSEA: 0
RHINORRHEA: 0
DIARRHEA: 0

## 2023-06-17 ASSESSMENT — PAIN SCALES - GENERAL
PAINLEVEL_OUTOF10: 8
PAINLEVEL_OUTOF10: 8

## 2023-06-17 ASSESSMENT — PAIN DESCRIPTION - DESCRIPTORS: DESCRIPTORS: SORE

## 2023-06-17 ASSESSMENT — PAIN - FUNCTIONAL ASSESSMENT: PAIN_FUNCTIONAL_ASSESSMENT: 0-10

## 2023-06-17 ASSESSMENT — PAIN DESCRIPTION - PAIN TYPE: TYPE: ACUTE PAIN

## 2023-06-17 NOTE — ED PROVIDER NOTES
[address] ED  EMERGENCY DEPARTMENT ENCOUNTER        Pt Name: Salome Rosales  MRN: [id number]  [place] 1975  Date of evaluation: 6/17/2023  Provider: SHANTA Wang CNP  PCP: SHANTA Crenshaw CNP    This patient was not seen and evaluated by the attending physician No att. providers found. I have evaluated this patient. My supervising physician was available for consultation. CHIEF COMPLAINT       Chief Complaint   Patient presents with    Fall     Pt states tripped and fell at work, head hit on the concrete, states believes she lost consciousness, denies blood thinners, c/o pain from coccyx to head       HISTORY OF PRESENT ILLNESS   (Location/Symptom, Timing/Onset, Context/Setting, Quality, Duration, Modifying Factors, Severity)  Note limiting factors. History from : Patient  Salome Rosales is a 46 y.o. female who presents via private car for neck pain, low back pain. Onset was this afternoon. Duration has been since the onset. Context includes patient presents to the emergency department today after falling at work. She was standing on a pallet and fell backwards striking her butt and falling backward onto her head. She did have a brief possible loss of consciousness where she states \"my vision went black\". She denies dizziness or lightheadedness prior to the fall. She tripped falling backwards off of the palate. She is endorsing pain to her low back and sacral area as well as her cervical spine. She denies changes in sensation such as numbness or tingling to her extremities. She denies other injuries associated with this. .. Quality is dull and aching with radiation to her neck and low back. Alleviating factors include rest and immobilization. Aggravating factors include movement, palpation. Pain is 8/10. Nothing has been used for pain today. Chart review reveals pt has significant PMHx of hypertension,. They take no medications.      Nursing Notes were

## 2023-06-17 NOTE — DISCHARGE INSTRUCTIONS
Because you had pain of your cervical spine despite normal imaging I sent you home in a collar for protection. Please wear this until your are reevaluated by neurosurgery, call Monday to schedule an appointment and let them know you were seen in the ER and are in a cervical collar until you are reevaluated. If you are unable to have this reevaluated in the next week please return to the ER for reevaluation. You can take Tylenol and Ibuprofen for pain as needed. Thyroid Nodule    Your x-ray or CT scan shows a nodule (\"spot\") on your thyroid. This will require follow-up for further evaluation. Please call your Primary Care Physician (PCP) if you have already established one and you confirmed your PCP during your ER visit today. If you do not have a PCP, please call the [facility] scheduling number to schedule your Emergency Department follow up visit. Please mention that you are scheduling an \"ER follow up visit\" for a thyroid nodule. Learning About Thyroid Nodules  Thyroid nodules are growths or lumps in the thyroid gland. Your thyroid is in the front of your neck. It controls how your body uses energy. You may have tests to see if the nodule is caused by cancer. Most nodules aren't cancerous and don't cause problems. Many don't even need treatment. If you do have cancer, it can usually be cured. Treatment will probably include surgery. You may also get radioactive iodine treatment. If your thyroid can't make thyroid hormone after treatment, you can take a pill every day to replace the hormone. Follow-up care is a key part of your treatment and safety. Be sure to make and go to all appointments, and call your doctor if you are having problems. It's also a good idea to know your test results and keep a list of the medicines you take. What is the next step in evaluation of a thyroid nodule?   Below are the recommended guidelines for management of thyroid nodules detected by CT/MRI, you

## 2023-06-17 NOTE — ED NOTES
Discharge instructions reviewed, patient verbalizes understanding. Denies questions/concerns at this time. Patient ambulatory out of ED in stable condition with all belongings.        Anny Kramer RN  06/17/23 5104

## 2023-06-17 NOTE — ED TRIAGE NOTES
Chief Complaint   Patient presents with    Fall     Pt states tripped and fell at work, head hit on the concrete, states believes she lost consciousness, denies blood thinners, c/o pain from coccyx to head

## 2023-06-19 ENCOUNTER — TELEPHONE (OUTPATIENT)
Dept: ORTHOPEDIC SURGERY | Age: 48
End: 2023-06-19

## 2023-06-19 NOTE — TELEPHONE ENCOUNTER
Appointment Request     Patient requesting earlier appointment: Yes  Appointment offered to patient: 6.28.2023  Patient Contact Number: [phone]    Patient wanted to make office aware she is in a cervical collar.

## 2023-06-28 ENCOUNTER — OFFICE VISIT (OUTPATIENT)
Dept: ORTHOPEDIC SURGERY | Age: 48
End: 2023-06-28

## 2023-06-28 VITALS — WEIGHT: 190 LBS | BODY MASS INDEX: 31.65 KG/M2 | HEIGHT: 65 IN

## 2023-06-28 DIAGNOSIS — S16.1XXA STRAIN OF NECK MUSCLE, INITIAL ENCOUNTER: Primary | ICD-10-CM

## 2023-06-28 DIAGNOSIS — S09.90XA INJURY OF HEAD, INITIAL ENCOUNTER: ICD-10-CM

## 2023-06-28 RX ORDER — MELOXICAM 15 MG/1
15 TABLET ORAL DAILY PRN
Qty: 30 TABLET | Refills: 0 | Status: SHIPPED | OUTPATIENT
Start: 2023-06-28 | End: 2023-07-28

## 2023-06-30 ENCOUNTER — TELEPHONE (OUTPATIENT)
Age: 48
End: 2023-06-30

## 2023-07-03 ENCOUNTER — TELEPHONE (OUTPATIENT)
Dept: ORTHOPEDIC SURGERY | Age: 48
End: 2023-07-03

## 2023-07-03 DIAGNOSIS — S16.1XXA STRAIN OF NECK MUSCLE, INITIAL ENCOUNTER: Primary | ICD-10-CM

## 2023-07-03 NOTE — TELEPHONE ENCOUNTER
Patient contacted regarding Cervical MRI WO CONTRAST approval and authorization is valid. Patient was notified and instructed to call to schedule at [facility] Colten Julien, [address] [facility] P: [phone]    Once completed, patient was instructed on scheduling a follow up appoint to review results.  TEST RESULTS WILL NOT BE GIVEN OVER THE PHONE. AN IN-OFFICE APPOINTMENT IS REQUIRED

## 2023-07-06 ENCOUNTER — HOSPITAL ENCOUNTER (OUTPATIENT)
Dept: MRI IMAGING | Age: 48
Discharge: HOME | End: 2023-07-06
Payer: COMMERCIAL

## 2023-07-06 DIAGNOSIS — S16.1XXA STRAIN OF NECK MUSCLE, INITIAL ENCOUNTER: ICD-10-CM

## 2023-07-06 PROCEDURE — 72141 MRI NECK SPINE W/O DYE: CPT

## 2023-07-12 ENCOUNTER — OFFICE VISIT (OUTPATIENT)
Dept: ORTHOPEDIC SURGERY | Age: 48
End: 2023-07-12
Payer: COMMERCIAL

## 2023-07-12 VITALS — BODY MASS INDEX: 31.65 KG/M2 | WEIGHT: 190 LBS | HEIGHT: 65 IN

## 2023-07-12 DIAGNOSIS — S16.1XXD STRAIN OF NECK MUSCLE, SUBSEQUENT ENCOUNTER: Primary | ICD-10-CM

## 2023-07-12 PROCEDURE — 99214 OFFICE O/P EST MOD 30 MIN: CPT | Performed by: PHYSICIAN ASSISTANT

## 2023-07-12 NOTE — PROGRESS NOTES
FOLLOW UP: SPINE    7/12/2023     CHIEF COMPLAINT:  neck pain, f/u    HISTORY OF PRESENT ILLNESS:              The patient is a 46 y.o. female h/o HTN initially referred by the ED here to review C MRI for neck pain after a work injury. She works in [employer] at [employer] and states on 6/17/2023 she fell backwards at work and hit her head and neck on concrete. She had a brief LOC and blurred vision upon awakening. She was subsequently seen and evaluated the emergency department who performed CT scans; C MRI was ordered to rule out fracture due to point tenderness. She reported immediate aching point tenderness in her left cervical spine. Her symptoms are episodic and increased with cervical range of motion. She reports neck \"tightness\". She reports some relief with cervical collar. Conservative care includes Tylenol, c-collar, Mobic. She does report some improvement at this current time--70%. She currently denies upper extremity radiating pain or progressive numbness. She denies any extremity weakness. Denies any fine motor difficulty or gait instability. She denies any neck issues prior to this work injury. At the time of the injury she did also experience some headaches which have improved. Our request for neurology consultation given brief loss of consciousness, headache and blurry vision was denied by [facility]    The pain assessment was noted & reviewed in the medical record today.      Work Status/Functionality: [employer] at [employer]    Past Medical History: Medical history form was reviewed today & scanned into the media tab  Past Medical History:   Diagnosis Date    Chest pain 11/14/2013    Hypertension     Pneumonia     Prolonged emergence from general anesthesia       Past Surgical History:     Past Surgical History:   Procedure Laterality Date    ANTERIOR CRUCIATE LIGAMENT REPAIR Left     CARPAL TUNNEL RELEASE Left 07/2020    CHOLECYSTECTOMY, LAPAROSCOPIC N/A 11/7/2022    LAPAROSCOPIC

## 2023-07-19 ENCOUNTER — OFFICE VISIT (OUTPATIENT)
Age: 48
End: 2023-07-19

## 2023-07-19 VITALS
HEART RATE: 88 BPM | DIASTOLIC BLOOD PRESSURE: 9 MMHG | SYSTOLIC BLOOD PRESSURE: 150 MMHG | OXYGEN SATURATION: 98 % | WEIGHT: 190 LBS | BODY MASS INDEX: 31.65 KG/M2 | HEIGHT: 65 IN

## 2023-07-19 DIAGNOSIS — F07.81 POSTCONCUSSION SYNDROME: ICD-10-CM

## 2023-07-19 DIAGNOSIS — M54.81 OCCIPITAL NEURALGIA OF RIGHT SIDE: Primary | ICD-10-CM

## 2023-07-19 RX ORDER — AMITRIPTYLINE HYDROCHLORIDE 10 MG/1
10 TABLET, FILM COATED ORAL NIGHTLY
Qty: 30 TABLET | Refills: 5 | Status: SHIPPED | OUTPATIENT
Start: 2023-07-19

## 2023-07-19 NOTE — PROGRESS NOTES
Neurology outpatient new visit    Patient name: Katie Campos      Chief Complaint:  New onset of refractory headache. History of present illness: This is a 46years old right-handed female. The patient is here for evaluation of refractory headache. Unfortunately, the patient had a fall with head concussion to the back of her head 1 month ago. At that time, the patient also reported loss of consciousness. The patient had no focal weakness or numbness after the fall. The patient also had CT brain negative for brain injury all intracerebral hemorrhage. However, the patient reported the new onset of headache few days after the fall. The patient describes her headache as dull aching pain with moderate to severe intensity over the right occipital head region. The patient denies nauseous, phonophobia or photophobia with the headache. But the patient reports intermittent dizziness with the headache without spinning sensation. The headache initially occurred almost every day. The headache can last about few hours. The patient takes meloxicam and Tylenol as needed for this headache. Per the patient, the headache has been slowly spontaneously improved. Currently, the headache occurs every other day. The patient denies history of migraine headache.     Past medical history:    Past Medical History:   Diagnosis Date    Chest pain 11/14/2013    Hypertension     Pneumonia     Prolonged emergence from general anesthesia        Past surgical history:    Past Surgical History:   Procedure Laterality Date    ANTERIOR CRUCIATE LIGAMENT REPAIR Left     CARPAL TUNNEL RELEASE Left 07/2020    CHOLECYSTECTOMY, LAPAROSCOPIC N/A 11/7/2022    LAPAROSCOPIC CHOLECYSTECTOMY performed by Manda Grayson MD at [address] [place] [address] N/A 8/27/2020    VIDEO HYSTEROSCOPY DILATATION AND CURETTAGE, NOVASURE ABLATION, MYOSURE performed by Asuncion Webb MD at [address] [facility]    HYSTEROSCOPY  1/23/15

## 2023-07-20 ENCOUNTER — TELEPHONE (OUTPATIENT)
Dept: ORTHOPEDIC SURGERY | Age: 48
End: 2023-07-20

## 2023-07-20 NOTE — TELEPHONE ENCOUNTER
Patient contacted in regards to PT being approved by [facility], Patient was contacted & informed of this yesterday. Patient also had Neurology appointment yesterday. They have put her on Medicine to help with headaches for the next 6 months. Patient is to follow up with Neurology every month. I voiced understanding. Patient will follow up for cervical issues with Brenden Garcia PA-C on 8-. She voiced understanding.

## 2023-07-27 ENCOUNTER — TELEPHONE (OUTPATIENT)
Dept: ORTHOPEDIC SURGERY | Age: 48
End: 2023-07-27

## 2023-08-14 ENCOUNTER — HOSPITAL ENCOUNTER (OUTPATIENT)
Dept: ULTRASOUND IMAGING | Age: 48
Discharge: HOME | End: 2023-08-14
Payer: COMMERCIAL

## 2023-08-14 DIAGNOSIS — E04.1 NODULE OF RIGHT LOBE OF THYROID GLAND: ICD-10-CM

## 2023-08-14 PROCEDURE — 76536 US EXAM OF HEAD AND NECK: CPT

## 2023-08-22 ENCOUNTER — OFFICE VISIT (OUTPATIENT)
Age: 48
End: 2023-08-22
Payer: COMMERCIAL

## 2023-08-22 VITALS
DIASTOLIC BLOOD PRESSURE: 94 MMHG | HEART RATE: 93 BPM | OXYGEN SATURATION: 99 % | BODY MASS INDEX: 32.99 KG/M2 | HEIGHT: 65 IN | WEIGHT: 198 LBS | SYSTOLIC BLOOD PRESSURE: 142 MMHG

## 2023-08-22 DIAGNOSIS — F07.81 POSTCONCUSSION SYNDROME: Primary | ICD-10-CM

## 2023-08-22 DIAGNOSIS — M54.81 OCCIPITAL NEURALGIA OF RIGHT SIDE: ICD-10-CM

## 2023-08-22 PROCEDURE — 3080F DIAST BP >= 90 MM HG: CPT | Performed by: PSYCHIATRY & NEUROLOGY

## 2023-08-22 PROCEDURE — 3077F SYST BP >= 140 MM HG: CPT | Performed by: PSYCHIATRY & NEUROLOGY

## 2023-08-22 PROCEDURE — 99214 OFFICE O/P EST MOD 30 MIN: CPT | Performed by: PSYCHIATRY & NEUROLOGY

## 2023-08-22 RX ORDER — AMITRIPTYLINE HYDROCHLORIDE 25 MG/1
25 TABLET, FILM COATED ORAL NIGHTLY
Qty: 30 TABLET | Refills: 1 | Status: SHIPPED | OUTPATIENT
Start: 2023-08-22

## 2023-08-22 NOTE — PROGRESS NOTES
Neurology outpatient F/U visit    Patient name: Leena Ballesteros      Chief Complaint:  New onset of refractory headache. History of present illness: This is a 46years old right-handed female. The patient is here for evaluation of refractory headache. Unfortunately, the patient had a fall with head concussion to the back of her head 1 month ago. At that time, the patient also reported loss of consciousness. The patient had no focal weakness or numbness after the fall. The patient also had CT brain negative for brain injury all intracerebral hemorrhage. However, the patient reported the new onset of headache few days after the fall. The patient describes her headache as dull aching pain with moderate to severe intensity over the right occipital head region. The patient denies nauseous, phonophobia or photophobia with the headache. But the patient reports intermittent dizziness with the headache without spinning sensation. The headache initially occurred almost every day. The headache can last about few hours. The patient takes meloxicam and Tylenol as needed for this headache. Per the patient, the headache has been slowly spontaneously improved. Currently, the headache occurs every other day. The patient denies history of migraine headache. Interval History:  08/22/23: The patient remains to have significant impact on her headache. However, the headache has been decreased by 50% in terms of frequency and severity. The patient remains to have significant light sensitivity. Per the patient, the severe headache occurs at least 3 days a week. For the rest of the week, the headache is mild to moderate. The patient denies significant side effect from amitriptyline.     Past medical history:    Past Medical History:   Diagnosis Date    Chest pain 11/14/2013    Hypertension     Pneumonia     Prolonged emergence from general anesthesia        Past surgical history:    Past Surgical History:   Procedure

## 2023-08-28 ENCOUNTER — OFFICE VISIT (OUTPATIENT)
Dept: ORTHOPEDIC SURGERY | Age: 48
End: 2023-08-28
Payer: COMMERCIAL

## 2023-08-28 VITALS — WEIGHT: 198 LBS | HEIGHT: 65 IN | BODY MASS INDEX: 32.99 KG/M2

## 2023-08-28 DIAGNOSIS — S09.90XD INJURY OF HEAD, SUBSEQUENT ENCOUNTER: ICD-10-CM

## 2023-08-28 DIAGNOSIS — S16.1XXD STRAIN OF NECK MUSCLE, SUBSEQUENT ENCOUNTER: Primary | ICD-10-CM

## 2023-08-28 PROCEDURE — 99213 OFFICE O/P EST LOW 20 MIN: CPT | Performed by: PHYSICIAN ASSISTANT

## 2023-08-28 RX ORDER — MELOXICAM 15 MG/1
15 TABLET ORAL DAILY PRN
Qty: 30 TABLET | Refills: 0 | Status: SHIPPED | OUTPATIENT
Start: 2023-08-28 | End: 2023-09-27

## 2023-08-28 NOTE — PROGRESS NOTES
Hypertension     Pneumonia     Prolonged emergence from general anesthesia       Past Surgical History:     Past Surgical History:   Procedure Laterality Date    ANTERIOR CRUCIATE LIGAMENT REPAIR Left     CARPAL TUNNEL RELEASE Left 07/2020    CHOLECYSTECTOMY, LAPAROSCOPIC N/A 11/7/2022    LAPAROSCOPIC [facility]LECYSTECTOMY [facility]ain [facility] N/A 8/27/2020    VIDEO [facility], NOVASURE ABLATION, MYOSURE performed by Kranthi Clemente MD at [address] [facility]  1/23/15    polypectomy, D&C myosure    HYSTEROSCOPY  04/04/2018    KNEE ARTHROSCOPY Left     X 2    WRIST GANGLION EXCISION Left      Current Medications:     Current Outpatient Medications:     amitriptyline (ELAVIL) 25 MG tablet, Take 1 tablet by mouth nightly, Disp: 30 tablet, Rfl: 1    meloxicam (MOBIC) 15 MG tablet, Take 1 tablet by mouth daily as needed for Pain, Disp: 30 tablet, Rfl: 0  Allergies:  Benzocaine, Lisinopril, and Tioconazole  Social History:    reports that she has never smoked. She has never used smokeless tobacco. She reports that she does not drink alcohol and does not use drugs. Family History:   Family History   Problem Relation Age of Onset    High Blood Pressure Mother     Diabetes Father     Heart Attack Father     High Blood Pressure Father        REVIEW OF SYSTEMS: Full ROS reviewed & scanned into chart  CONSTITUTIONAL: Denies unexplained weight loss, fevers   SKIN: Denies active skin conditions         PHYSICAL EXAM:    Vitals: Height 5' 5\" (1.651 m), weight 198 lb (89.8 kg), not currently breastfeeding. Pain score 0/10, seated    GENERAL EXAM:  General Apparence: Patient is adequately groomed with no evidence of malnutrition. Orientation: The patient is oriented to time, place and person. Mood & Affect:The patient's mood and affect are appropriate   Vascular: Examination reveals no swelling tenderness in upper or lower extremities.

## 2023-08-31 ENCOUNTER — OFFICE VISIT (OUTPATIENT)
Dept: ENT CLINIC | Age: 48
End: 2023-08-31
Payer: COMMERCIAL

## 2023-08-31 VITALS — HEIGHT: 65 IN | OXYGEN SATURATION: 99 % | BODY MASS INDEX: 32.99 KG/M2 | TEMPERATURE: 98 F | WEIGHT: 198 LBS

## 2023-08-31 DIAGNOSIS — E04.1 THYROID NODULE: Primary | ICD-10-CM

## 2023-08-31 PROCEDURE — 99213 OFFICE O/P EST LOW 20 MIN: CPT | Performed by: OTOLARYNGOLOGY

## 2023-08-31 ASSESSMENT — ENCOUNTER SYMPTOMS
SORE THROAT: 0
APNEA: 0
VOICE CHANGE: 0
COUGH: 0
EYE ITCHING: 0
TROUBLE SWALLOWING: 0
FACIAL SWELLING: 0
SINUS PRESSURE: 0
SHORTNESS OF BREATH: 0

## 2023-09-20 ENCOUNTER — OFFICE VISIT (OUTPATIENT)
Age: 48
End: 2023-09-20

## 2023-09-20 VITALS
OXYGEN SATURATION: 98 % | BODY MASS INDEX: 32.99 KG/M2 | SYSTOLIC BLOOD PRESSURE: 134 MMHG | HEART RATE: 86 BPM | WEIGHT: 198 LBS | HEIGHT: 65 IN | DIASTOLIC BLOOD PRESSURE: 94 MMHG

## 2023-09-20 DIAGNOSIS — F07.81 POSTCONCUSSION SYNDROME: Primary | ICD-10-CM

## 2023-09-20 DIAGNOSIS — M54.81 OCCIPITAL NEURALGIA OF RIGHT SIDE: ICD-10-CM

## 2023-09-21 ENCOUNTER — HOSPITAL ENCOUNTER (OUTPATIENT)
Dept: ULTRASOUND IMAGING | Age: 48
Discharge: HOME | End: 2023-09-21
Attending: OTOLARYNGOLOGY
Payer: COMMERCIAL

## 2023-09-21 VITALS
RESPIRATION RATE: 14 BRPM | SYSTOLIC BLOOD PRESSURE: 189 MMHG | HEART RATE: 80 BPM | DIASTOLIC BLOOD PRESSURE: 103 MMHG | OXYGEN SATURATION: 97 %

## 2023-09-21 DIAGNOSIS — E04.1 THYROID NODULE: ICD-10-CM

## 2023-09-21 PROCEDURE — 88173 CYTOPATH EVAL FNA REPORT: CPT

## 2023-09-21 PROCEDURE — 10005 FNA BX W/US GDN 1ST LES: CPT

## 2023-09-21 PROCEDURE — 88305 TISSUE EXAM BY PATHOLOGIST: CPT

## 2023-09-21 NOTE — OR NURSING
Pt arrived for image guided thyroid nodule biopsy right. Procedure explained including the risk and benefits of the procedure. All questions answered. Pt verbalizes understanding of the of procedure and states no more questions. Consent signed. Vital signs stable, labs, allergies, medications, and code status reviewed. No contraindications noted. Vitals:    09/21/23 0934   BP: (!) 189/103   Pulse: 91   SpO2: 99%    (vital signs in table format)    Jasmina Score  2 - Able to move 4 extremities voluntarily on command  2 - BP+/- 20mmHg of normal  2 - Fully awake  2 - Able to maintain oxygen saturation >92% on room air  2 - Able to breathe deeply and cough freely    Allergies  Benzocaine, Lisinopril, and Tioconazole  Pt states has had dental procedures with anesthetic without issues.      Labs  Lab Results   Component Value Date    INR 1.18 (H) 01/23/2018    PROTIME 13.3 (H) 01/23/2018       Lab Results   Component Value Date    CREATININE 0.6 10/18/2022    BUN 13 10/18/2022     10/18/2022    K 3.7 10/18/2022     10/18/2022    CO2 29 10/18/2022       Lab Results   Component Value Date    WBC 7.1 10/18/2022    HGB 14.8 10/18/2022    HCT 41.4 10/18/2022    MCV 88.2 10/18/2022     10/18/2022

## 2023-09-21 NOTE — OR NURSING
Image guided thyroid nodule biopsy biopsy completed by Dr. Gabriel Cr. Pt tolerated procedure without any signs or symptoms of distress. Vital signs stable. Pt is outpatient and will be discharged home when complete. Total Biopsy: 5  Received: Versed: 0 mg       Fentanyl: 0 mcg  Bandage to neck that is clean dry and intact.        Vital Signs  Vitals:    09/21/23 0934   BP: (!) 189/103   Pulse: 91   SpO2: 99%    (vital signs in table format)    Post Jasmina  2 - Able to move 4 extremities voluntarily on command  2 - BP+/- 20mmHg of normal  2 - Fully awake  2 - Able to maintain oxygen saturation >92% on room air  2 - Able to breathe deeply and cough freely

## 2023-09-22 ENCOUNTER — TELEPHONE (OUTPATIENT)
Dept: ENT CLINIC | Age: 48
End: 2023-09-22

## 2023-09-25 NOTE — TELEPHONE ENCOUNTER
Spoke with the patient and informed of the results and that the specimen was sent for further testing.

## 2023-09-25 NOTE — TELEPHONE ENCOUNTER
Left message for the patient to call the office back. I spoke with Paris Gilbert at the lab and the specimen was sent for genetic testing on 09/22/2023.

## 2023-10-11 ENCOUNTER — TELEPHONE (OUTPATIENT)
Dept: ENT CLINIC | Age: 48
End: 2023-10-11

## 2023-10-11 NOTE — TELEPHONE ENCOUNTER
Spoke with patient regarding results of Afirma testing. Recommendation  for repeat ultrasound in 1 year.

## 2023-10-16 RX ORDER — AMITRIPTYLINE HYDROCHLORIDE 25 MG/1
25 TABLET, FILM COATED ORAL NIGHTLY
Qty: 30 TABLET | Refills: 2 | Status: SHIPPED | OUTPATIENT
Start: 2023-10-16

## 2024-01-29 RX ORDER — AMITRIPTYLINE HYDROCHLORIDE 25 MG/1
25 TABLET, FILM COATED ORAL NIGHTLY
Qty: 90 TABLET | OUTPATIENT
Start: 2024-01-29

## 2024-05-06 ENCOUNTER — HOSPITAL ENCOUNTER (EMERGENCY)
Age: 49
Discharge: HOME | End: 2024-05-06
Attending: EMERGENCY MEDICINE
Payer: COMMERCIAL

## 2024-05-06 VITALS
DIASTOLIC BLOOD PRESSURE: 91 MMHG | WEIGHT: 198.8 LBS | HEIGHT: 65 IN | TEMPERATURE: 97.7 F | BODY MASS INDEX: 33.12 KG/M2 | HEART RATE: 94 BPM | SYSTOLIC BLOOD PRESSURE: 145 MMHG | OXYGEN SATURATION: 100 % | RESPIRATION RATE: 18 BRPM

## 2024-05-06 DIAGNOSIS — B37.2 YEAST DERMATITIS: Primary | ICD-10-CM

## 2024-05-06 PROCEDURE — 6370000000 HC RX 637 (ALT 250 FOR IP): Performed by: EMERGENCY MEDICINE

## 2024-05-06 PROCEDURE — 99283 EMERGENCY DEPT VISIT LOW MDM: CPT

## 2024-05-06 RX ORDER — LIDOCAINE HYDROCHLORIDE 20 MG/ML
5 SOLUTION OROPHARYNGEAL
Qty: 100 ML | Refills: 0 | Status: SHIPPED | OUTPATIENT
Start: 2024-05-06

## 2024-05-06 RX ORDER — FLUCONAZOLE 150 MG/1
150 TABLET ORAL DAILY
Qty: 3 TABLET | Refills: 0 | Status: SHIPPED | OUTPATIENT
Start: 2024-05-06 | End: 2024-05-09

## 2024-05-06 RX ORDER — LIDOCAINE HYDROCHLORIDE 20 MG/ML
15 SOLUTION OROPHARYNGEAL ONCE
Status: COMPLETED | OUTPATIENT
Start: 2024-05-06 | End: 2024-05-06

## 2024-05-06 RX ORDER — LOSARTAN POTASSIUM 25 MG/1
25 TABLET ORAL DAILY
COMMUNITY

## 2024-05-06 RX ADMIN — LIDOCAINE HYDROCHLORIDE 15 ML: 20 SOLUTION ORAL at 11:10

## 2024-05-06 ASSESSMENT — PAIN SCALES - GENERAL: PAINLEVEL_OUTOF10: 9

## 2024-05-06 ASSESSMENT — LIFESTYLE VARIABLES: HOW OFTEN DO YOU HAVE A DRINK CONTAINING ALCOHOL: NEVER

## 2024-05-06 ASSESSMENT — PAIN - FUNCTIONAL ASSESSMENT: PAIN_FUNCTIONAL_ASSESSMENT: 0-10

## 2024-05-06 NOTE — ED PROVIDER NOTES
[id number]  [phone]    Go to   As needed, If symptoms worsen    [place] [facility]  [address] [facility] [address]  [phone]  Go to   As needed, If symptoms worsen      Cruz REN, cara the primary attending of record and contributed the majority of evaluation and treatment of emergent care for this encounter.     Total critical care time is 0 minutes, which excludes separately billable procedures and updating family. Time spent is specifically for management of the presenting complaint and symptoms initially, direct bedside care, reevaluation, review of records, and consultation.  There was a high probability of clinically significant life-threatening deterioration in the patient's condition, which required my urgent intervention.     This chart was generated in part by using Dragon Dictation system and may contain errors related to that system including errors in grammar, punctuation, and spelling, as well as words and phrases that may be inappropriate. If there are any questions or concerns please feel free to contact the dictating provider for clarification.     Cruz Hernandez MD   [employer]        Cruz Hernandez MD  05/06/24 1132

## 2024-06-14 ENCOUNTER — HOSPITAL ENCOUNTER (EMERGENCY)
Age: 49
Discharge: HOME | End: 2024-06-14
Attending: EMERGENCY MEDICINE
Payer: COMMERCIAL

## 2024-06-14 ENCOUNTER — APPOINTMENT (OUTPATIENT)
Dept: GENERAL RADIOLOGY | Age: 49
End: 2024-06-14
Payer: COMMERCIAL

## 2024-06-14 ENCOUNTER — APPOINTMENT (OUTPATIENT)
Dept: CT IMAGING | Age: 49
End: 2024-06-14
Payer: COMMERCIAL

## 2024-06-14 VITALS
BODY MASS INDEX: 33.32 KG/M2 | HEART RATE: 78 BPM | DIASTOLIC BLOOD PRESSURE: 96 MMHG | SYSTOLIC BLOOD PRESSURE: 158 MMHG | WEIGHT: 200 LBS | OXYGEN SATURATION: 100 % | TEMPERATURE: 98.1 F | RESPIRATION RATE: 18 BRPM | HEIGHT: 65 IN

## 2024-06-14 DIAGNOSIS — R53.1 GENERALIZED WEAKNESS: Primary | ICD-10-CM

## 2024-06-14 LAB
ALBUMIN SERPL-MCNC: 3.9 G/DL (ref 3.4–5)
ALBUMIN/GLOB SERPL: 1.9 {RATIO} (ref 1.1–2.2)
ALP SERPL-CCNC: 72 U/L (ref 40–129)
ALT SERPL-CCNC: 18 U/L (ref 10–40)
ANION GAP SERPL CALCULATED.3IONS-SCNC: 10 MMOL/L (ref 3–16)
AST SERPL-CCNC: 16 U/L (ref 15–37)
BACTERIA URNS QL MICRO: ABNORMAL /HPF
BASOPHILS # BLD: 0 K/UL (ref 0–0.2)
BASOPHILS NFR BLD: 0.6 %
BILIRUB SERPL-MCNC: 0.5 MG/DL (ref 0–1)
BILIRUB UR QL STRIP.AUTO: NEGATIVE
BUN SERPL-MCNC: 13 MG/DL (ref 7–20)
CALCIUM SERPL-MCNC: 8.4 MG/DL (ref 8.3–10.6)
CHLORIDE SERPL-SCNC: 106 MMOL/L (ref 99–110)
CLARITY UR: CLEAR
CO2 SERPL-SCNC: 24 MMOL/L (ref 21–32)
COLOR UR: YELLOW
CREAT SERPL-MCNC: 0.6 MG/DL (ref 0.6–1.1)
DEPRECATED RDW RBC AUTO: 13.1 % (ref 12.4–15.4)
EKG ATRIAL RATE: 86 BPM
EKG DIAGNOSIS: NORMAL
EKG P AXIS: 50 DEGREES
EKG P-R INTERVAL: 144 MS
EKG Q-T INTERVAL: 370 MS
EKG QRS DURATION: 128 MS
EKG QTC CALCULATION (BAZETT): 442 MS
EKG R AXIS: -21 DEGREES
EKG T AXIS: 0 DEGREES
EKG VENTRICULAR RATE: 86 BPM
EOSINOPHIL # BLD: 0.1 K/UL (ref 0–0.6)
EOSINOPHIL NFR BLD: 1.7 %
EPI CELLS #/AREA URNS HPF: ABNORMAL /HPF (ref 0–5)
ERYTHROCYTE [SEDIMENTATION RATE] IN BLOOD BY WESTERGREN METHOD: 4 MM/HR (ref 0–30)
GFR SERPLBLD CREATININE-BSD FMLA CKD-EPI: >90 ML/MIN/{1.73_M2}
GLUCOSE SERPL-MCNC: 136 MG/DL (ref 70–99)
GLUCOSE UR STRIP.AUTO-MCNC: NEGATIVE MG/DL
HCG UR QL: NEGATIVE
HCT VFR BLD AUTO: 38.2 % (ref 36–48)
HGB BLD-MCNC: 13.3 G/DL (ref 12–16)
HGB UR QL STRIP.AUTO: ABNORMAL
KETONES UR STRIP.AUTO-MCNC: NEGATIVE MG/DL
LEUKOCYTE ESTERASE UR QL STRIP.AUTO: ABNORMAL
LYMPHOCYTES # BLD: 2.1 K/UL (ref 1–5.1)
LYMPHOCYTES NFR BLD: 29.2 %
MCH RBC QN AUTO: 31.1 PG (ref 26–34)
MCHC RBC AUTO-ENTMCNC: 34.7 G/DL (ref 31–36)
MCV RBC AUTO: 89.5 FL (ref 80–100)
MONOCYTES # BLD: 0.5 K/UL (ref 0–1.3)
MONOCYTES NFR BLD: 6.8 %
NEUTROPHILS # BLD: 4.5 K/UL (ref 1.7–7.7)
NEUTROPHILS NFR BLD: 61.7 %
NITRITE UR QL STRIP.AUTO: NEGATIVE
PH UR STRIP.AUTO: 5.5 [PH] (ref 5–8)
PLATELET # BLD AUTO: 236 K/UL (ref 135–450)
PMV BLD AUTO: 7.8 FL (ref 5–10.5)
POTASSIUM SERPL-SCNC: 3.6 MMOL/L (ref 3.5–5.1)
PROT SERPL-MCNC: 6 G/DL (ref 6.4–8.2)
PROT UR STRIP.AUTO-MCNC: NEGATIVE MG/DL
RBC # BLD AUTO: 4.27 M/UL (ref 4–5.2)
RBC #/AREA URNS HPF: ABNORMAL /HPF (ref 0–4)
SARS-COV-2 RDRP RESP QL NAA+PROBE: NOT DETECTED
SODIUM SERPL-SCNC: 140 MMOL/L (ref 136–145)
SP GR UR STRIP.AUTO: <=1.005 (ref 1–1.03)
TROPONIN, HIGH SENSITIVITY: <6 NG/L (ref 0–14)
UA COMPLETE W REFLEX CULTURE PNL UR: ABNORMAL
UA DIPSTICK W REFLEX MICRO PNL UR: YES
URN SPEC COLLECT METH UR: ABNORMAL
UROBILINOGEN UR STRIP-ACNC: 0.2 E.U./DL
WBC # BLD AUTO: 7.3 K/UL (ref 4–11)
WBC #/AREA URNS HPF: ABNORMAL /HPF (ref 0–5)

## 2024-06-14 PROCEDURE — 71045 X-RAY EXAM CHEST 1 VIEW: CPT

## 2024-06-14 PROCEDURE — 96360 HYDRATION IV INFUSION INIT: CPT

## 2024-06-14 PROCEDURE — 6360000004 HC RX CONTRAST MEDICATION: Performed by: EMERGENCY MEDICINE

## 2024-06-14 PROCEDURE — 2580000003 HC RX 258: Performed by: EMERGENCY MEDICINE

## 2024-06-14 PROCEDURE — 84443 ASSAY THYROID STIM HORMONE: CPT

## 2024-06-14 PROCEDURE — 93005 ELECTROCARDIOGRAM TRACING: CPT | Performed by: EMERGENCY MEDICINE

## 2024-06-14 PROCEDURE — 70450 CT HEAD/BRAIN W/O DYE: CPT

## 2024-06-14 PROCEDURE — 70498 CT ANGIOGRAPHY NECK: CPT

## 2024-06-14 PROCEDURE — 80053 COMPREHEN METABOLIC PANEL: CPT

## 2024-06-14 PROCEDURE — 85025 COMPLETE CBC W/AUTO DIFF WBC: CPT

## 2024-06-14 PROCEDURE — 81001 URINALYSIS AUTO W/SCOPE: CPT

## 2024-06-14 PROCEDURE — 84436 ASSAY OF TOTAL THYROXINE: CPT

## 2024-06-14 PROCEDURE — 84484 ASSAY OF TROPONIN QUANT: CPT

## 2024-06-14 PROCEDURE — 87635 SARS-COV-2 COVID-19 AMP PRB: CPT

## 2024-06-14 PROCEDURE — 36415 COLL VENOUS BLD VENIPUNCTURE: CPT

## 2024-06-14 PROCEDURE — 99285 EMERGENCY DEPT VISIT HI MDM: CPT

## 2024-06-14 PROCEDURE — 85652 RBC SED RATE AUTOMATED: CPT

## 2024-06-14 PROCEDURE — 84703 CHORIONIC GONADOTROPIN ASSAY: CPT

## 2024-06-14 PROCEDURE — 93010 ELECTROCARDIOGRAM REPORT: CPT | Performed by: INTERNAL MEDICINE

## 2024-06-14 RX ORDER — 0.9 % SODIUM CHLORIDE 0.9 %
1000 INTRAVENOUS SOLUTION INTRAVENOUS ONCE
Status: COMPLETED | OUTPATIENT
Start: 2024-06-14 | End: 2024-06-14

## 2024-06-14 RX ADMIN — SODIUM CHLORIDE 1000 ML: 9 INJECTION, SOLUTION INTRAVENOUS at 13:43

## 2024-06-14 RX ADMIN — IOMEPROL INJECTION 75 ML: 714 INJECTION, SOLUTION INTRAVASCULAR at 13:24

## 2024-06-14 ASSESSMENT — PAIN DESCRIPTION - ORIENTATION: ORIENTATION: LEFT

## 2024-06-14 ASSESSMENT — PAIN DESCRIPTION - LOCATION: LOCATION: NECK;ARM

## 2024-06-14 ASSESSMENT — ENCOUNTER SYMPTOMS
COUGH: 0
SHORTNESS OF BREATH: 0

## 2024-06-14 ASSESSMENT — LIFESTYLE VARIABLES
HOW OFTEN DO YOU HAVE A DRINK CONTAINING ALCOHOL: NEVER
HOW MANY STANDARD DRINKS CONTAINING ALCOHOL DO YOU HAVE ON A TYPICAL DAY: PATIENT DOES NOT DRINK

## 2024-06-14 ASSESSMENT — PAIN DESCRIPTION - DESCRIPTORS: DESCRIPTORS: TINGLING

## 2024-06-14 ASSESSMENT — PAIN SCALES - GENERAL: PAINLEVEL_OUTOF10: 6

## 2024-06-14 ASSESSMENT — PAIN - FUNCTIONAL ASSESSMENT: PAIN_FUNCTIONAL_ASSESSMENT: 0-10

## 2024-06-14 NOTE — DISCHARGE INSTRUCTIONS
Drink lots and lots of fluids get plenty of rest take Tylenol or ibuprofen for any low-grade fever and then follow-up with Td Millan if not getting better in 4 to 5 days or if other symptoms acutely develop proceed to a major hospital

## 2024-06-14 NOTE — ED PROVIDER NOTES
EXAM    (up to 7 for level 4, 8 or more for level 5)     ED Triage Vitals [06/14/24 1240]   BP Temp Temp Source Pulse Respirations SpO2 Height Weight - Scale   (!) 182/100 98.1 °F (36.7 °C) Oral 89 18 98 % 1.651 m (5' 5\") 90.7 kg (200 lb)       Physical Exam  Vitals and nursing note reviewed.   Constitutional:       General: She is not in acute distress.     Appearance: Normal appearance. She is well-developed. She is not diaphoretic.   HENT:      Head: Normocephalic.      Right Ear: Ear canal and external ear normal.      Left Ear: Ear canal and external ear normal.      Nose: Nose normal.      Mouth/Throat:      Mouth: Mucous membranes are moist.   Eyes:      Conjunctiva/sclera: Conjunctivae normal.      Pupils: Pupils are equal, round, and reactive to light.   Neck:      Thyroid: No thyromegaly.   Cardiovascular:      Rate and Rhythm: Normal rate and regular rhythm.      Heart sounds: Normal heart sounds. No murmur heard.     No friction rub. No gallop.   Pulmonary:      Effort: Pulmonary effort is normal. No respiratory distress.      Breath sounds: Normal breath sounds.   Abdominal:      General: Bowel sounds are normal. There is no distension.      Palpations: Abdomen is soft.      Tenderness: There is no abdominal tenderness.   Musculoskeletal:         General: No tenderness.      Cervical back: Normal range of motion and neck supple.   Neurological:      Mental Status: She is alert and oriented to person, place, and time.      GCS: GCS eye subscore is 4. GCS verbal subscore is 5. GCS motor subscore is 6.      Cranial Nerves: No cranial nerve deficit.      Sensory: Sensory deficit present.      Motor: No abnormal muscle tone.      Coordination: Coordination normal.      Gait: Gait normal.      Deep Tendon Reflexes: Reflexes normal.   Psychiatric:         Behavior: Behavior normal.         DIAGNOSTIC RESULTS     EKG: All EKG's are interpreted by the Emergency Department Physician who either signs or Co-signs

## 2024-06-15 LAB
T4 SERPL-MCNC: 6.6 UG/DL (ref 4.5–10.9)
TSH SERPL DL<=0.005 MIU/L-ACNC: 1.3 UIU/ML (ref 0.27–4.2)

## 2024-06-18 ENCOUNTER — TELEPHONE (OUTPATIENT)
Age: 49
End: 2024-06-18

## 2024-06-18 NOTE — TELEPHONE ENCOUNTER
Patient cancelled appointment on 6/19/24 with Dr Whalen for 6 month f/u.    Reason: pt states she was informed during LOV that if she wasn't having headaches she could cancel the appt. Patient states she's no longer having headaches.     Patient did not reschedule appointment.

## 2024-06-20 RX ORDER — AMITRIPTYLINE HYDROCHLORIDE 10 MG/1
10 TABLET, FILM COATED ORAL NIGHTLY
Qty: 90 TABLET | OUTPATIENT
Start: 2024-06-20

## 2025-02-25 ENCOUNTER — APPOINTMENT (OUTPATIENT)
Dept: CT IMAGING | Age: 50
End: 2025-02-25
Payer: COMMERCIAL

## 2025-02-25 ENCOUNTER — HOSPITAL ENCOUNTER (EMERGENCY)
Age: 50
Discharge: HOME | End: 2025-02-25
Attending: EMERGENCY MEDICINE
Payer: COMMERCIAL

## 2025-02-25 VITALS
RESPIRATION RATE: 16 BRPM | HEART RATE: 75 BPM | BODY MASS INDEX: 33.28 KG/M2 | OXYGEN SATURATION: 98 % | WEIGHT: 200 LBS | SYSTOLIC BLOOD PRESSURE: 154 MMHG | DIASTOLIC BLOOD PRESSURE: 93 MMHG | TEMPERATURE: 97.1 F

## 2025-02-25 DIAGNOSIS — F07.81 POSTCONCUSSIVE SYNDROME: ICD-10-CM

## 2025-02-25 DIAGNOSIS — S09.90XA CLOSED HEAD INJURY, INITIAL ENCOUNTER: Primary | ICD-10-CM

## 2025-02-25 PROCEDURE — 6370000000 HC RX 637 (ALT 250 FOR IP): Performed by: EMERGENCY MEDICINE

## 2025-02-25 PROCEDURE — 6360000002 HC RX W HCPCS: Performed by: EMERGENCY MEDICINE

## 2025-02-25 PROCEDURE — 96372 THER/PROPH/DIAG INJ SC/IM: CPT

## 2025-02-25 PROCEDURE — 70450 CT HEAD/BRAIN W/O DYE: CPT

## 2025-02-25 PROCEDURE — 99284 EMERGENCY DEPT VISIT MOD MDM: CPT

## 2025-02-25 RX ORDER — KETOROLAC TROMETHAMINE 15 MG/ML
15 INJECTION, SOLUTION INTRAMUSCULAR; INTRAVENOUS ONCE
Status: COMPLETED | OUTPATIENT
Start: 2025-02-25 | End: 2025-02-25

## 2025-02-25 RX ORDER — ACETAMINOPHEN 500 MG
1000 TABLET ORAL ONCE
Status: COMPLETED | OUTPATIENT
Start: 2025-02-25 | End: 2025-02-25

## 2025-02-25 RX ADMIN — KETOROLAC TROMETHAMINE 15 MG: 15 INJECTION, SOLUTION INTRAMUSCULAR; INTRAVENOUS at 09:17

## 2025-02-25 RX ADMIN — ACETAMINOPHEN 1000 MG: 500 TABLET ORAL at 08:29

## 2025-02-25 ASSESSMENT — PAIN SCALES - GENERAL
PAINLEVEL_OUTOF10: 10
PAINLEVEL_OUTOF10: 9

## 2025-02-25 ASSESSMENT — PAIN DESCRIPTION - LOCATION
LOCATION: HEAD
LOCATION: HEAD

## 2025-02-25 ASSESSMENT — PAIN DESCRIPTION - DESCRIPTORS: DESCRIPTORS: STABBING

## 2025-02-25 NOTE — DISCHARGE INSTR - COC
Continuity of Care Form    Patient Name: Kristina Landin   :  1975  MRN:  [id number]    Admit date:  2025  Discharge date:  ***    Code Status Order: Prior   Advance Directives:   Advance Care Flowsheet Documentation             Admitting Physician:  No admitting provider for patient encounter.  PCP: Td Millan, APRN - CNP    Discharging Nurse: ***  Discharging Hospital Unit/Room#: R1-R3/R1  Discharging Unit Phone Number: ***    Emergency Contact:   Extended Emergency Contact Information  Primary Emergency Contact: Isrrael Landin  Address: [id number] [place] [address]  Home Phone: [phone]  Work Phone: [phone]  Mobile Phone: [phone]  Relation: Spouse   needed? No    Past Surgical History:  Past Surgical History:   Procedure Laterality Date    ANTERIOR CRUCIATE LIGAMENT REPAIR Left     CARPAL TUNNEL RELEASE Left 2020    CHOLECYSTECTOMY, LAPAROSCOPIC N/A 2022    LAPAROSCOPIC CHOLECYSTECTOMY [facility]jia Abbott [facility]    DILATION AND CURETTAGE OF UTERUS N/A 2020    VIDEO HYSTEROSCOPY DILATATION AND CURETTAGE, NOVASURE ABLATION, MYOSURE performed by Gene Montejo MD at [facility]    HYSTEROSCOPY  1/23/15    polypectomy, D&C myosure    HYSTEROSCOPY  2018    KNEE ARTHROSCOPY Left     X 2    WRIST GANGLION EXCISION Left        Immunization History:   Immunization History   Administered Date(s) Administered    Influenza Virus Vaccine 2013       Active Problems:  Patient Active Problem List   Diagnosis Code    Chest pain R07.9    Abnormal uterine bleeding (AUB) N93.9    Iron deficiency anemia due to chronic blood loss D50.0    Fibroid uterus D25.9    Pneumonia J18.9    Hypertension I10    Dyspnea on exertion R06.09    Fatigue R53.83    Abnormal EKG R94.31    Chronic calculous cholecystitis 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

## 2025-02-25 NOTE — ED PROVIDER NOTES
Emergency Department Provider Note  Location: [facility] EMERGENCY DEPARTMENT  2/25/2025     Patient Identification  Kristina Landin is a 49 y.o. female    Chief Complaint  Head Injury (Fall at work today moving skids; fell backward hit head on concrete; denies LOC pain to right ankle  )          HPI  (History provided by patient)  Patient is a 49-year-old female who presents with occipital headache after a close head injury from a fall.  She reports that she was at work and was clearing skids and got her foot caught up on some wrapping material and fell backwards and hit the back of her head on a concrete ledge on the ground.  Denies any loss of consciousness.  Denies blood thinners or anticoagulants.      Nursing Notes were all reviewed and agreed with, or any disagreements were addressed in the HPI:  Allergies:   Allergies   Allergen Reactions    Benzocaine Swelling    Lisinopril Angioedema    Tioconazole      Other reaction(s): vag burning, swelling, abd. pain  Other reaction(s): vag burning, swelling, abd. pain       Past medical history:  has a past medical history of Chest pain (11/14/2013), Hypertension, Pneumonia, and Prolonged emergence from general anesthesia.    Past surgical history:  has a past surgical history that includes Anterior cruciate ligament repair (Left); Wrist ganglion excision (Left); hysteroscopy (1/23/15); Knee arthroscopy (Left); hysteroscopy (04/04/2018); Carpal tunnel release (Left, 07/2020); Dilation and curettage of uterus (N/A, 8/27/2020); and Cholecystectomy, laparoscopic (N/A, 11/7/2022).    Home medications:   Prior to Admission medications    Medication Sig Start Date End Date Taking? Authorizing Provider   losartan (COZAAR) 25 MG tablet Take 2 tablets by mouth daily    Provider, MD Stevenson   lidocaine viscous hcl (XYLOCAINE) 2 % SOLN solution Place 5 mLs vaginally every 3 hours as needed for Irritation 5/6/24   Cruz Hernandez MD       Social history:  reports that

## 2025-08-15 ENCOUNTER — HOSPITAL ENCOUNTER (EMERGENCY)
Age: 50
Discharge: HOME | End: 2025-08-15
Attending: EMERGENCY MEDICINE
Payer: COMMERCIAL

## 2025-08-15 ENCOUNTER — APPOINTMENT (OUTPATIENT)
Dept: GENERAL RADIOLOGY | Age: 50
End: 2025-08-15
Payer: COMMERCIAL

## 2025-08-15 VITALS
HEART RATE: 98 BPM | RESPIRATION RATE: 18 BRPM | DIASTOLIC BLOOD PRESSURE: 92 MMHG | TEMPERATURE: 98.4 F | SYSTOLIC BLOOD PRESSURE: 143 MMHG | OXYGEN SATURATION: 97 % | WEIGHT: 199.6 LBS | BODY MASS INDEX: 33.26 KG/M2 | HEIGHT: 65 IN

## 2025-08-15 DIAGNOSIS — M79.632 LEFT FOREARM PAIN: Primary | ICD-10-CM

## 2025-08-15 DIAGNOSIS — R03.0 ELEVATED BLOOD PRESSURE READING: ICD-10-CM
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
## 2025-08-15 PROCEDURE — 73090 X-RAY EXAM OF FOREARM: CPT

## 2025-08-15 PROCEDURE — 99283 EMERGENCY DEPT VISIT LOW MDM: CPT

## 2025-08-15 PROCEDURE — 73080 X-RAY EXAM OF ELBOW: CPT

## 2025-08-15 ASSESSMENT — PAIN DESCRIPTION - ONSET: ONSET: GRADUAL

## 2025-08-15 ASSESSMENT — PAIN - FUNCTIONAL ASSESSMENT: PAIN_FUNCTIONAL_ASSESSMENT: 0-10

## 2025-08-15 ASSESSMENT — PAIN DESCRIPTION - LOCATION: LOCATION: ARM

## 2025-08-15 ASSESSMENT — PAIN SCALES - GENERAL: PAINLEVEL_OUTOF10: 5

## 2025-08-15 ASSESSMENT — PAIN DESCRIPTION - PAIN TYPE: TYPE: ACUTE PAIN

## 2025-08-15 ASSESSMENT — PAIN DESCRIPTION - DESCRIPTORS: DESCRIPTORS: ACHING

## 2025-08-15 ASSESSMENT — PAIN DESCRIPTION - ORIENTATION: ORIENTATION: LEFT

## (undated) DEVICE — SUTURE VCRL SZ 4-0 L18IN ABSRB UD L19MM PS-2 3/8 CIR PRIM J496H

## (undated) DEVICE — GLOVE,SURG,SENSICARE,ALOE,LF,PF,7: Brand: MEDLINE

## (undated) DEVICE — INSUFFLATION NEEDLE TO ESTABLISH PNEUMOPERITONEUM.: Brand: INSUFFLATION NEEDLE

## (undated) DEVICE — TUBING INSUF ISO CONN DISP

## (undated) DEVICE — AGENT HEMSTAT W2XL4IN OXIDIZED REGENERATED CELOS ABSRB

## (undated) DEVICE — MINOR SET UP PK

## (undated) DEVICE — LIGHT HANDLE: Brand: DEVON

## (undated) DEVICE — TUBING, SUCTION, 3/16" X 10', STRAIGHT: Brand: MEDLINE

## (undated) DEVICE — INTENDED FOR TISSUE SEPARATION, AND OTHER PROCEDURES THAT REQUIRE A SHARP SURGICAL BLADE TO PUNCTURE OR CUT.: Brand: BARD-PARKER ® STAINLESS STEEL BLADES

## (undated) DEVICE — SUTURE SZ 0 27IN 5/8 CIR UR-6  TAPER PT VIOLET ABSRB VICRYL J603H

## (undated) DEVICE — DEVICE TISS REM DIA3MM L25.25IN ENDOSCP F/ IU POLYPS

## (undated) DEVICE — TRAY PREP DRY W/ PREM GLV 2 APPL 6 SPNG 2 UNDPD 1 OVERWRAP

## (undated) DEVICE — DRAPE,ABDOMINAL,MAJOR,STERILE: Brand: MEDLINE

## (undated) DEVICE — CIRCUIT ANES L72IN 3L BACT AND VIR FLTR EL CONN SGL LIMB

## (undated) DEVICE — CORD ES L10FT MPLR LAP

## (undated) DEVICE — SMARTGOWN BREATHABLE SURGICAL GOWN: Brand: CONVERTORS

## (undated) DEVICE — TROCAR: Brand: KII FIOS FIRST ENTRY

## (undated) DEVICE — SET GRAV VENT NVENT CK VLV 3 NDL FREE PRT 10 GTT

## (undated) DEVICE — STANDARD HYPODERMIC NEEDLE,POLYPROPYLENE HUB: Brand: MONOJECT

## (undated) DEVICE — SET ENDOSCP SEAL HYSTEROSCOPE RIG OUTFLO CHN DISP MYOSURE

## (undated) DEVICE — YANKAUER,BULB TIP,W/O VENT,RIGID,STERILE: Brand: MEDLINE

## (undated) DEVICE — SHEET,DRAPE,53X77,STERILE: Brand: MEDLINE

## (undated) DEVICE — SET FLD MGMT CTRL SYS INFLO TB AQUILEX

## (undated) DEVICE — KIT,ANTI FOG,W/SPONGE & FLUID,SOFT PACK: Brand: MEDLINE

## (undated) DEVICE — GOWN,AURORA,NONREINF,RAGLAN,XXL,STERILE: Brand: MEDLINE

## (undated) DEVICE — SET FLD MGMT OUTFLO TB DISP FOR CTRL SYS AQUILEX

## (undated) DEVICE — CATHETER IV 20GA L1.25IN PNK FEP SFTY STR HUB RADPQ DISP

## (undated) DEVICE — SET ADMIN PRIMING 7ML L30IN 7.35LB 20 GTT 2ND RLER CLMP

## (undated) DEVICE — SOLUTION IRRIG 5L LAC R BG

## (undated) DEVICE — 3M™ TEGADERM™ TRANSPARENT FILM DRESSING FRAME STYLE, 1624W, 2-3/8 IN X 2-3/4 IN (6 CM X 7 CM), 100/CT 4CT/CASE: Brand: 3M™ TEGADERM™

## (undated) DEVICE — SOLUTION IV IRRIG 500ML 0.9% SODIUM CHL 2F7123

## (undated) DEVICE — SOLUTION IV 1000ML LAC RINGERS PH 6.5 INJ USP VIAFLX PLAS

## (undated) DEVICE — DRAPE,UNDERBUTTOCKS,PCH,STERILE: Brand: MEDLINE

## (undated) DEVICE — PUMP SUC IRR TBNG L10FT W/ HNDPC ASSEMB STRYKEFLOW 2

## (undated) DEVICE — GAUZE SPONGES,8 PLY: Brand: CURITY

## (undated) DEVICE — PAD,SANITARY,11 IN,MAXI,W/WINGS,N-STRL: Brand: MEDLINE

## (undated) DEVICE — TISSUE RETRIEVAL SYSTEM: Brand: INZII RETRIEVAL SYSTEM

## (undated) DEVICE — ELECTRODE PT RET AD L9FT HI MOIST COND ADH HYDRGEL CORDED

## (undated) DEVICE — GLOVE SURG SZ 75 L12IN FNGR THK94MIL STD WHT LTX FREE

## (undated) DEVICE — KIT THERMOABLATION 6MM ENDOMET DEV NOVASURE

## (undated) DEVICE — SYRINGE MED 10ML LUERLOCK TIP W/O SFTY DISP

## (undated) DEVICE — INVIEW CLEAR LEGGINGS: Brand: CONVERTORS

## (undated) DEVICE — 3M™ STERI-STRIP™ COMPOUND BENZOIN TINCTURE 40 BAGS/CARTON 4 CARTONS/CASE C1544: Brand: 3M™ STERI-STRIP™

## (undated) DEVICE — TROCAR: Brand: KII® SLEEVE

## (undated) DEVICE — MAJOR SET UP PK

## (undated) DEVICE — DISPOSABLE LAPAROSCOPIC CLIP APPLIER WITH 20 CLIPS.: Brand: EPIX® UNIVERSAL CLIP APPLIER

## (undated) DEVICE — APPLICATOR MEDICATED 26 CC SOLUTION HI LT ORNG CHLORAPREP

## (undated) DEVICE — X-RAY DETECTABLE SPONGES,16 PLY: Brand: VISTEC

## (undated) DEVICE — GLOVE ORANGE PI 8 1/2   MSG9085

## (undated) DEVICE — PAD,NON-ADHERENT,3X8,STERILE,LF,1/PK: Brand: MEDLINE

## (undated) DEVICE — QUILTED PREMIUM COMFORT UNDERPAD,EXTRA HEAVY: Brand: WINGS